# Patient Record
Sex: FEMALE | Race: WHITE | Employment: PART TIME | URBAN - METROPOLITAN AREA
[De-identification: names, ages, dates, MRNs, and addresses within clinical notes are randomized per-mention and may not be internally consistent; named-entity substitution may affect disease eponyms.]

---

## 2017-02-03 ENCOUNTER — TELEPHONE (OUTPATIENT)
Dept: FAMILY MEDICINE CLINIC | Facility: CLINIC | Age: 52
End: 2017-02-03

## 2017-02-03 RX ORDER — TRAMADOL HYDROCHLORIDE 50 MG/1
TABLET ORAL
Qty: 20 TABLET | Refills: 0 | Status: SHIPPED
Start: 2017-02-03 | End: 2017-03-08

## 2017-02-03 RX ORDER — TRAMADOL HYDROCHLORIDE 50 MG/1
TABLET ORAL
Qty: 150 TABLET | Refills: 2 | Status: SHIPPED
Start: 2017-02-03 | End: 2017-05-08

## 2017-02-03 NOTE — TELEPHONE ENCOUNTER
Patient advised prescription has been sent in [facility]

## 2017-02-27 NOTE — TELEPHONE ENCOUNTER
Medication prescription is ready for , thanks. I need to see her at the end of next month for a yearly ADD and med check before any other refills can be generated.   Thanks

## 2017-02-27 NOTE — TELEPHONE ENCOUNTER
Patient notified via detailed voicemail left at cell number (ok per  HIPAA consent)     Please schedule patient appt when she  script

## 2017-03-08 ENCOUNTER — OFFICE VISIT (OUTPATIENT)
Dept: FAMILY MEDICINE CLINIC | Facility: CLINIC | Age: 52
End: 2017-03-08

## 2017-03-08 VITALS
WEIGHT: 208 LBS | DIASTOLIC BLOOD PRESSURE: 85 MMHG | HEART RATE: 64 BPM | SYSTOLIC BLOOD PRESSURE: 138 MMHG | BODY MASS INDEX: 33.43 KG/M2 | HEIGHT: 66 IN | RESPIRATION RATE: 14 BRPM | TEMPERATURE: 97 F

## 2017-03-08 DIAGNOSIS — F98.8 ADD (ATTENTION DEFICIT DISORDER): ICD-10-CM

## 2017-03-08 DIAGNOSIS — E03.9 HYPOTHYROIDISM, UNSPECIFIED TYPE: ICD-10-CM

## 2017-03-08 DIAGNOSIS — Z00.00 WELL ADULT EXAM: Primary | ICD-10-CM

## 2017-03-08 DIAGNOSIS — Z23 NEED FOR VACCINATION: ICD-10-CM

## 2017-03-08 DIAGNOSIS — Z12.31 ENCOUNTER FOR SCREENING MAMMOGRAM FOR BREAST CANCER: ICD-10-CM

## 2017-03-08 PROCEDURE — 90471 IMMUNIZATION ADMIN: CPT | Performed by: FAMILY MEDICINE

## 2017-03-08 PROCEDURE — 90746 HEPB VACCINE 3 DOSE ADULT IM: CPT | Performed by: FAMILY MEDICINE

## 2017-03-08 PROCEDURE — 99396 PREV VISIT EST AGE 40-64: CPT | Performed by: FAMILY MEDICINE

## 2017-03-08 RX ORDER — MEDROXYPROGESTERONE ACETATE 2.5 MG/1
2.5 TABLET ORAL
COMMUNITY
Start: 2017-03-08 | End: 2021-07-06

## 2017-03-08 RX ORDER — TRAMADOL HYDROCHLORIDE 50 MG/1
TABLET ORAL
COMMUNITY
Start: 2017-03-08 | End: 2017-05-08 | Stop reason: ALTCHOICE

## 2017-03-08 NOTE — PROGRESS NOTES
Nic Bauer is a 46year old female.     CC:  Patient presents with:  Physical      HPI:  Patient is here for yearly, wellness exam  Last Lipid: 10/15  Last colonoscopy: never  Last Tetanus: 2016  Last mammo: 2014  Last Pap: per GYN    Needs paper work c disorder)    • Acne rosacea    • Hypothyroid    • Reactive airway disease    • Depression           Past Surgical History    OTHER SURGICAL HISTORY      Comment partial thyroidectomy          OTHER SURGICAL HISTORY      Comment BACK SURGERY    OTH ambulation, full ROM and 5/5 strength in B arms and legs  NEURO: intact; no sensorimotor deficit; reflexes normal  In B legs    ASSESSMENT AND PLAN    1. Well adult exam  Await labs and mammo  D/c Tob use  - KIMBERLEY SCREENING BILAT (CPT=77067);  Future  - Comp

## 2017-03-21 ENCOUNTER — OFFICE VISIT (OUTPATIENT)
Dept: FAMILY MEDICINE CLINIC | Facility: CLINIC | Age: 52
End: 2017-03-21

## 2017-03-21 ENCOUNTER — TELEPHONE (OUTPATIENT)
Dept: FAMILY MEDICINE CLINIC | Facility: CLINIC | Age: 52
End: 2017-03-21

## 2017-03-21 VITALS
HEART RATE: 80 BPM | RESPIRATION RATE: 16 BRPM | WEIGHT: 204 LBS | DIASTOLIC BLOOD PRESSURE: 90 MMHG | BODY MASS INDEX: 33 KG/M2 | TEMPERATURE: 99 F | SYSTOLIC BLOOD PRESSURE: 146 MMHG

## 2017-03-21 DIAGNOSIS — K64.8 OTHER HEMORRHOIDS: Primary | ICD-10-CM

## 2017-03-21 PROCEDURE — 99244 OFF/OP CNSLTJ NEW/EST MOD 40: CPT | Performed by: SURGERY

## 2017-03-21 NOTE — PROGRESS NOTES
Delroy Perez is a 46year old female  Patient presents with:  Consult: PCP: Dr. Cory Shearer. . consult for first colonscopy. . denies family history of colon problems. Caryn Bruce denies pain or discomfort. . room 2      REFERRED BY    Patient presents with  Request for col PHOS-BENZOYL PEROX 1-5 % External Gel APPLY TWICE A DAY Disp: 100 g Rfl: 4   Zolpidem Tartrate (AMBIEN) 5 MG Oral Tab Take 1 tablet by mouth nightly as needed for Sleep.  Disp: 30 tablet Rfl: 1     No current facility-administered medications on file prior clear to auscultation, no rales , rhonchi or wheezing  CARDIOVASCULAR: RRR, murmur negative  ABDOMEN: normal active BS, soft, nondistended  no HSM, no masses or hernias  LYMPHATIC: no axillary , supraclavicular or inguinal lymphadenopathy  EXTREMITIES: no

## 2017-03-30 NOTE — TELEPHONE ENCOUNTER
Anesthesia Evaluation     .             ROS/MED HX    ENT/Pulmonary:  - neg pulmonary ROS     Neurologic:       Cardiovascular:     (+) hypertension----. : . . . :. .       METS/Exercise Tolerance:     Hematologic:         Musculoskeletal:         GI/Hepatic:         Renal/Genitourinary:         Endo:     (+) thyroid problem hypothyroidism, .      Psychiatric:         Infectious Disease:         Malignancy:         Other:                     Physical Exam  Normal systems: pulmonary and dental    Airway   Mallampati: II  TM distance: >3 FB  Neck ROM: full    Dental     Cardiovascular   Rhythm and rate: regular and normal      Pulmonary                     Anesthesia Plan      History & Physical Review  History and physical reviewed and following examination; no interval change.    ASA Status:  2 .    NPO Status:  > 8 hours    Plan for MAC Reason for MAC:  Deep or markedly invasive procedure (G8)         Postoperative Care  Postoperative pain management:  Multi-modal analgesia.      Consents                          .   Dr Kendell Pablo spoke to Stefan Armando

## 2017-04-01 NOTE — TELEPHONE ENCOUNTER
Last OV 3/8/17  Last refilled 2/27/17  #30  0 refills    Patient aware Dr Lucero Chen out of office until Monday

## 2017-04-07 ENCOUNTER — TELEPHONE (OUTPATIENT)
Dept: FAMILY MEDICINE CLINIC | Facility: CLINIC | Age: 52
End: 2017-04-07

## 2017-04-10 ENCOUNTER — TELEPHONE (OUTPATIENT)
Dept: FAMILY MEDICINE CLINIC | Facility: CLINIC | Age: 52
End: 2017-04-10

## 2017-04-10 NOTE — TELEPHONE ENCOUNTER
CPT: [id number], [id number], [id number]  DX: Z12.11, K64.8    I called [facility] and spoke to Fidel. No auth required for the above procedures.  simone

## 2017-04-14 ENCOUNTER — TELEPHONE (OUTPATIENT)
Dept: FAMILY MEDICINE CLINIC | Facility: CLINIC | Age: 52
End: 2017-04-14

## 2017-04-15 NOTE — TELEPHONE ENCOUNTER
Pt states Dr Marcelo Mean wanted her to have an EGD done due to acid reflux. Pt aware it shouldn't be a problem to add this to her exam. Pt aware I will only call her back if there is a problem with adding it on.  Pt v/u. Nasima Hearn

## 2017-04-19 PROCEDURE — 88305 TISSUE EXAM BY PATHOLOGIST: CPT | Performed by: SURGERY

## 2017-04-24 ENCOUNTER — TELEPHONE (OUTPATIENT)
Dept: FAMILY MEDICINE CLINIC | Facility: CLINIC | Age: 52
End: 2017-04-24

## 2017-04-24 NOTE — TELEPHONE ENCOUNTER
Last refill: 8- #8.5 inhaler with 3 refills    Last Visit: 3-8-2017    Next Visit: No future appointments. Forward to Dr. Brenna Otoole please advise on refills. Thanks.

## 2017-04-28 PROCEDURE — 88304 TISSUE EXAM BY PATHOLOGIST: CPT | Performed by: SURGERY

## 2017-05-02 ENCOUNTER — TELEPHONE (OUTPATIENT)
Dept: FAMILY MEDICINE CLINIC | Facility: CLINIC | Age: 52
End: 2017-05-02

## 2017-05-02 RX ORDER — HYDROCODONE BITARTRATE AND ACETAMINOPHEN 5; 325 MG/1; MG/1
1 TABLET ORAL EVERY 6 HOURS PRN
Qty: 30 TABLET | Refills: 0 | Status: SHIPPED | OUTPATIENT
Start: 2017-05-02 | End: 2017-10-19 | Stop reason: ALTCHOICE

## 2017-05-02 RX ORDER — HYDROCODONE BITARTRATE AND ACETAMINOPHEN 5; 325 MG/1; MG/1
1 TABLET ORAL EVERY 4 HOURS PRN
Qty: 30 TABLET | Refills: 1 | Status: SHIPPED | OUTPATIENT
Start: 2017-05-02 | End: 2017-05-02 | Stop reason: CLARIF

## 2017-05-02 NOTE — TELEPHONE ENCOUNTER
Pt is s/p AEUA, Hemorrhoidectomy on 4/28/17. Pt states she is taking 1 po every 4-6 hrs for the pain. Pt states she dropped about 5 tabs down the drain on accident. She does have a few left. Pt states pain is ok. Worse with BM's.  She is doing tub soaks to

## 2017-05-02 NOTE — TELEPHONE ENCOUNTER
[facility]. Pt's  [facility] Abel will pick-up script. simone ONEAL stating ok to continue to use the Preparation H for the 1st week post-op. Advised to call back with questions.  simone

## 2017-05-08 RX ORDER — TRAMADOL HYDROCHLORIDE 50 MG/1
TABLET ORAL
Qty: 150 TABLET | Refills: 1 | Status: SHIPPED
Start: 2017-05-08 | End: 2017-06-26

## 2017-05-08 NOTE — TELEPHONE ENCOUNTER
Last refilled on 2/3/17 for # 150 with 2 rf. Received request from [facility]. Last seen on 3/8/17. No future appointments. Thank you.

## 2017-05-11 ENCOUNTER — TELEPHONE (OUTPATIENT)
Dept: FAMILY MEDICINE CLINIC | Facility: CLINIC | Age: 52
End: 2017-05-11

## 2017-06-13 ENCOUNTER — TELEPHONE (OUTPATIENT)
Dept: FAMILY MEDICINE CLINIC | Facility: CLINIC | Age: 52
End: 2017-06-13

## 2017-06-13 NOTE — TELEPHONE ENCOUNTER
Pt is overdue to have CBC, CMP, lipid, TSH, and free T4. Message sent via [email or web address] to pt to call office to schedule.

## 2017-06-26 RX ORDER — TRAMADOL HYDROCHLORIDE 50 MG/1
TABLET ORAL
Qty: 21 TABLET | Refills: 0 | Status: SHIPPED
Start: 2017-06-26 | End: 2017-07-12

## 2017-07-12 ENCOUNTER — TELEPHONE (OUTPATIENT)
Dept: FAMILY MEDICINE CLINIC | Facility: CLINIC | Age: 52
End: 2017-07-12

## 2017-07-12 DIAGNOSIS — F51.01 PRIMARY INSOMNIA: ICD-10-CM

## 2017-07-12 DIAGNOSIS — M25.552 LEFT HIP PAIN: ICD-10-CM

## 2017-07-12 DIAGNOSIS — F98.8 ATTENTION DEFICIT DISORDER, UNSPECIFIED HYPERACTIVITY PRESENCE: ICD-10-CM

## 2017-07-12 DIAGNOSIS — E03.8 OTHER SPECIFIED HYPOTHYROIDISM: ICD-10-CM

## 2017-07-12 DIAGNOSIS — Z13.220 LIPID SCREENING: ICD-10-CM

## 2017-07-12 DIAGNOSIS — F98.8 ATTENTION DEFICIT DISORDER (ADD) WITHOUT HYPERACTIVITY: ICD-10-CM

## 2017-07-12 RX ORDER — TRAMADOL HYDROCHLORIDE 50 MG/1
TABLET ORAL
Qty: 30 TABLET | Refills: 0 | Status: SHIPPED | OUTPATIENT
Start: 2017-07-12 | End: 2017-07-12

## 2017-07-12 RX ORDER — CLINDAMYCIN AND BENZOYL PEROXIDE 10; 50 MG/G; MG/G
GEL TOPICAL
Qty: 100 G | Refills: 3 | Status: ON HOLD | OUTPATIENT
Start: 2017-07-12 | End: 2017-11-20

## 2017-07-12 RX ORDER — DEXTROAMPHETAMINE SACCHARATE, AMPHETAMINE ASPARTATE MONOHYDRATE, DEXTROAMPHETAMINE SULFATE AND AMPHETAMINE SULFATE 7.5; 7.5; 7.5; 7.5 MG/1; MG/1; MG/1; MG/1
30 CAPSULE, EXTENDED RELEASE ORAL DAILY
Qty: 30 CAPSULE | Refills: 0 | Status: SHIPPED | OUTPATIENT
Start: 2017-08-11 | End: 2017-09-10

## 2017-07-12 RX ORDER — DEXTROAMPHETAMINE SACCHARATE, AMPHETAMINE ASPARTATE, DEXTROAMPHETAMINE SULFATE AND AMPHETAMINE SULFATE 1.25; 1.25; 1.25; 1.25 MG/1; MG/1; MG/1; MG/1
5 TABLET ORAL DAILY
Qty: 30 TABLET | Refills: 0 | Status: SHIPPED | OUTPATIENT
Start: 2017-09-11 | End: 2017-10-11

## 2017-07-12 RX ORDER — TRAMADOL HYDROCHLORIDE 50 MG/1
TABLET ORAL
Qty: 30 TABLET | Refills: 0 | Status: SHIPPED | OUTPATIENT
Start: 2017-07-12 | End: 2017-07-25

## 2017-07-12 RX ORDER — DEXTROAMPHETAMINE SACCHARATE, AMPHETAMINE ASPARTATE MONOHYDRATE, DEXTROAMPHETAMINE SULFATE AND AMPHETAMINE SULFATE 7.5; 7.5; 7.5; 7.5 MG/1; MG/1; MG/1; MG/1
30 CAPSULE, EXTENDED RELEASE ORAL DAILY
Qty: 30 CAPSULE | Refills: 0 | Status: SHIPPED | OUTPATIENT
Start: 2017-07-12 | End: 2017-08-11

## 2017-07-12 RX ORDER — ALBUTEROL SULFATE 90 UG/1
AEROSOL, METERED RESPIRATORY (INHALATION)
Qty: 3 INHALER | Refills: 3 | Status: SHIPPED | OUTPATIENT
Start: 2017-07-12 | End: 2017-09-12

## 2017-07-12 RX ORDER — DEXTROAMPHETAMINE SACCHARATE, AMPHETAMINE ASPARTATE, DEXTROAMPHETAMINE SULFATE AND AMPHETAMINE SULFATE 1.25; 1.25; 1.25; 1.25 MG/1; MG/1; MG/1; MG/1
5 TABLET ORAL DAILY
Qty: 30 TABLET | Refills: 0 | Status: SHIPPED | OUTPATIENT
Start: 2017-07-12 | End: 2019-04-29

## 2017-07-12 RX ORDER — DEXTROAMPHETAMINE SACCHARATE, AMPHETAMINE ASPARTATE MONOHYDRATE, DEXTROAMPHETAMINE SULFATE AND AMPHETAMINE SULFATE 7.5; 7.5; 7.5; 7.5 MG/1; MG/1; MG/1; MG/1
30 CAPSULE, EXTENDED RELEASE ORAL DAILY
Qty: 30 CAPSULE | Refills: 0 | Status: SHIPPED | OUTPATIENT
Start: 2017-09-11 | End: 2019-08-27

## 2017-07-12 RX ORDER — DEXTROAMPHETAMINE SACCHARATE, AMPHETAMINE ASPARTATE, DEXTROAMPHETAMINE SULFATE AND AMPHETAMINE SULFATE 1.25; 1.25; 1.25; 1.25 MG/1; MG/1; MG/1; MG/1
5 TABLET ORAL DAILY
Qty: 30 TABLET | Refills: 0 | Status: SHIPPED | OUTPATIENT
Start: 2017-08-11 | End: 2019-04-29

## 2017-07-12 NOTE — TELEPHONE ENCOUNTER
Patient picked up 3 scripts for Adderall XR 30 MG (3 month supply), Adderall 5 MG (3 month supply) & Tramadol 50 MG on 07/12/17. [address] [place] [address] # [phone].

## 2017-07-12 NOTE — TELEPHONE ENCOUNTER
Pt called, needs refills on TRAMADOL HCL 50 MG Oral Tab, ADDERALL 30 mg and ADDERALL 5 mg. Pt will  scripts.   Please call pt at [phone]

## 2017-07-13 ENCOUNTER — TELEPHONE (OUTPATIENT)
Dept: FAMILY MEDICINE CLINIC | Facility: CLINIC | Age: 52
End: 2017-07-13

## 2017-07-13 NOTE — TELEPHONE ENCOUNTER
Spoke to the pharmacist at [facility] and advised her of the information per Dr. Domenic Miller

## 2017-07-13 NOTE — TELEPHONE ENCOUNTER
Asya from [facility] called - patient dropped off a prescription for the Tramadol - Asya advised that it is too soon-   Patient had refills on:  5 8 2017 # 150 with 1 refills  6 26 2017 #21   7 12 2017 #30   Patient advised the pharmacy that she had lost

## 2017-07-14 ENCOUNTER — TELEPHONE (OUTPATIENT)
Dept: FAMILY MEDICINE CLINIC | Facility: CLINIC | Age: 52
End: 2017-07-14

## 2017-07-14 RX ORDER — ESCITALOPRAM OXALATE 20 MG/1
TABLET ORAL
Qty: 90 TABLET | Refills: 2 | Status: ON HOLD | OUTPATIENT
Start: 2017-07-14 | End: 2017-11-20

## 2017-07-14 RX ORDER — LEVOTHYROXINE SODIUM 0.2 MG/1
200 TABLET ORAL
Qty: 90 TABLET | Refills: 2 | Status: SHIPPED | OUTPATIENT
Start: 2017-07-14 | End: 2018-04-10

## 2017-07-25 ENCOUNTER — TELEPHONE (OUTPATIENT)
Dept: FAMILY MEDICINE CLINIC | Facility: CLINIC | Age: 52
End: 2017-07-25

## 2017-07-25 ENCOUNTER — OFFICE VISIT (OUTPATIENT)
Dept: FAMILY MEDICINE CLINIC | Facility: CLINIC | Age: 52
End: 2017-07-25

## 2017-07-25 VITALS
WEIGHT: 201 LBS | OXYGEN SATURATION: 98 % | TEMPERATURE: 98 F | BODY MASS INDEX: 31 KG/M2 | HEART RATE: 84 BPM | RESPIRATION RATE: 20 BRPM | SYSTOLIC BLOOD PRESSURE: 146 MMHG | DIASTOLIC BLOOD PRESSURE: 100 MMHG

## 2017-07-25 DIAGNOSIS — R03.0 ELEVATED BLOOD PRESSURE READING: ICD-10-CM

## 2017-07-25 DIAGNOSIS — J31.0 PURULENT RHINITIS: Primary | ICD-10-CM

## 2017-07-25 PROCEDURE — 99214 OFFICE O/P EST MOD 30 MIN: CPT | Performed by: FAMILY MEDICINE

## 2017-07-25 RX ORDER — TRAMADOL HYDROCHLORIDE 50 MG/1
TABLET ORAL
Qty: 90 TABLET | Refills: 1 | Status: SHIPPED
Start: 2017-07-25 | End: 2017-09-05

## 2017-07-25 RX ORDER — AZITHROMYCIN 250 MG/1
TABLET, FILM COATED ORAL
Qty: 6 TABLET | Refills: 0 | Status: SHIPPED | OUTPATIENT
Start: 2017-07-25 | End: 2017-07-30

## 2017-07-25 NOTE — TELEPHONE ENCOUNTER
Left message on patients voice mail with the appointment time per  - asked patient to call the office to verify time

## 2017-07-25 NOTE — TELEPHONE ENCOUNTER
Pt Has a sinus infection and would either like to be seen or to have something sent to [facility].

## 2017-07-25 NOTE — PROGRESS NOTES
Serenity Stevens is a 45year old female. CC:  Patient presents with:  Sinus Problem: per pt      HPI:  The patient has primary complaint of facial pain   and nasal congestion for  1 week. Associated symptoms include rhinorrhea that is thick and yellow.  Diogenes Phoenix amphetamine-dextroamphetamine 5 MG Oral Tab Take 1 tablet (5 mg total) by mouth daily. Please fill after 28 days. Disp: 30 tablet Rfl: 0   [START ON 9/11/2017] amphetamine-dextroamphetamine 5 MG Oral Tab Take 1 tablet (5 mg total) by mouth daily.  Please fi Packs/day: 1.00      Years: 0.00      Smokeless tobacco: Never Used                      Alcohol use: Yes           0.0 oz/week     Comment: 2 glasses of wine per week        ROS:  General: lower energy  GI: Melia Nancy

## 2017-07-25 NOTE — TELEPHONE ENCOUNTER
Last refill: 7/12/2017 #30 with 0 refills  Last Visit: today  Next Visit: No future appointments. Request was requested for 90 days to be sent to [facility]. Forward to Dr. Alayna Perez please advise on refills. Thanks.

## 2017-07-28 NOTE — TELEPHONE ENCOUNTER
Phone call to [facility] and advised that this patient had Tramadol refilled on 7 25 2017  Phone call from pharmacy on 7 13 2017 that it is was too soon for a refill -would not be available for refill till 8 2 2017- if patient needed the medication she would have

## 2017-08-02 NOTE — TELEPHONE ENCOUNTER
Last OV 7/25/17, No future appointments.     Last rx given 7/25/17 for #90 with one refill was sent to [facility]

## 2017-08-03 RX ORDER — TRAMADOL HYDROCHLORIDE 50 MG/1
TABLET ORAL
Qty: 30 TABLET | Refills: 0 | OUTPATIENT
Start: 2017-08-03

## 2017-08-21 ENCOUNTER — PATIENT OUTREACH (OUTPATIENT)
Dept: FAMILY MEDICINE CLINIC | Facility: CLINIC | Age: 52
End: 2017-08-21

## 2017-09-05 RX ORDER — TRAMADOL HYDROCHLORIDE 50 MG/1
TABLET ORAL
Qty: 90 TABLET | Refills: 1 | Status: SHIPPED
Start: 2017-09-05 | End: 2017-10-06

## 2017-09-12 RX ORDER — ALBUTEROL SULFATE 90 UG/1
AEROSOL, METERED RESPIRATORY (INHALATION)
Qty: 3 INHALER | Refills: 0 | Status: SHIPPED | OUTPATIENT
Start: 2017-09-12 | End: 2017-09-20

## 2017-09-20 RX ORDER — ALBUTEROL SULFATE 90 UG/1
AEROSOL, METERED RESPIRATORY (INHALATION)
Qty: 3 INHALER | Refills: 0 | Status: ON HOLD | OUTPATIENT
Start: 2017-09-20 | End: 2017-11-20

## 2017-10-19 ENCOUNTER — TELEPHONE (OUTPATIENT)
Dept: FAMILY MEDICINE CLINIC | Facility: CLINIC | Age: 52
End: 2017-10-19

## 2017-10-19 DIAGNOSIS — E03.8 OTHER SPECIFIED HYPOTHYROIDISM: ICD-10-CM

## 2017-10-19 DIAGNOSIS — F51.01 PRIMARY INSOMNIA: ICD-10-CM

## 2017-10-19 DIAGNOSIS — M25.552 LEFT HIP PAIN: ICD-10-CM

## 2017-10-19 DIAGNOSIS — Z13.220 LIPID SCREENING: ICD-10-CM

## 2017-10-19 DIAGNOSIS — F98.8 ATTENTION DEFICIT DISORDER (ADD) WITHOUT HYPERACTIVITY: ICD-10-CM

## 2017-10-19 PROBLEM — M54.50 CHRONIC MIDLINE LOW BACK PAIN WITHOUT SCIATICA: Status: ACTIVE | Noted: 2017-10-19

## 2017-10-19 PROBLEM — G89.29 CHRONIC MIDLINE LOW BACK PAIN WITHOUT SCIATICA: Status: ACTIVE | Noted: 2017-10-19

## 2017-10-19 RX ORDER — DEXTROAMPHETAMINE SACCHARATE, AMPHETAMINE ASPARTATE, DEXTROAMPHETAMINE SULFATE AND AMPHETAMINE SULFATE 1.25; 1.25; 1.25; 1.25 MG/1; MG/1; MG/1; MG/1
5 TABLET ORAL DAILY
Qty: 30 TABLET | Refills: 0 | Status: SHIPPED | OUTPATIENT
Start: 2017-12-15 | End: 2017-10-19 | Stop reason: DRUGHIGH

## 2017-10-19 RX ORDER — DEXTROAMPHETAMINE SACCHARATE, AMPHETAMINE ASPARTATE, DEXTROAMPHETAMINE SULFATE AND AMPHETAMINE SULFATE 1.25; 1.25; 1.25; 1.25 MG/1; MG/1; MG/1; MG/1
5 TABLET ORAL DAILY
Qty: 30 TABLET | Refills: 0 | Status: SHIPPED | OUTPATIENT
Start: 2017-10-19 | End: 2017-10-19 | Stop reason: DRUGHIGH

## 2017-10-19 RX ORDER — DEXTROAMPHETAMINE SACCHARATE, AMPHETAMINE ASPARTATE, DEXTROAMPHETAMINE SULFATE AND AMPHETAMINE SULFATE 1.25; 1.25; 1.25; 1.25 MG/1; MG/1; MG/1; MG/1
5 TABLET ORAL DAILY
Qty: 30 TABLET | Refills: 0 | Status: SHIPPED | OUTPATIENT
Start: 2017-11-17 | End: 2017-10-19 | Stop reason: DRUGHIGH

## 2017-10-19 RX ORDER — DEXTROAMPHETAMINE SACCHARATE, AMPHETAMINE ASPARTATE MONOHYDRATE, DEXTROAMPHETAMINE SULFATE AND AMPHETAMINE SULFATE 7.5; 7.5; 7.5; 7.5 MG/1; MG/1; MG/1; MG/1
30 CAPSULE, EXTENDED RELEASE ORAL DAILY
Qty: 30 CAPSULE | Refills: 0 | Status: SHIPPED | OUTPATIENT
Start: 2017-10-19 | End: 2018-02-20

## 2017-10-19 RX ORDER — DEXTROAMPHETAMINE SACCHARATE, AMPHETAMINE ASPARTATE MONOHYDRATE, DEXTROAMPHETAMINE SULFATE AND AMPHETAMINE SULFATE 7.5; 7.5; 7.5; 7.5 MG/1; MG/1; MG/1; MG/1
30 CAPSULE, EXTENDED RELEASE ORAL DAILY
Qty: 30 CAPSULE | Refills: 0 | Status: SHIPPED | OUTPATIENT
Start: 2017-11-17 | End: 2017-10-19

## 2017-10-19 RX ORDER — DEXTROAMPHETAMINE SACCHARATE, AMPHETAMINE ASPARTATE MONOHYDRATE, DEXTROAMPHETAMINE SULFATE AND AMPHETAMINE SULFATE 7.5; 7.5; 7.5; 7.5 MG/1; MG/1; MG/1; MG/1
30 CAPSULE, EXTENDED RELEASE ORAL DAILY
Qty: 30 CAPSULE | Refills: 0 | Status: SHIPPED | OUTPATIENT
Start: 2017-12-15 | End: 2017-10-19

## 2017-10-19 NOTE — TELEPHONE ENCOUNTER
Pt picked up scripts for ADDERALL XR 30 mg and ADDERALL 5 mg, 10/19/17-01/14/18.    Pt [place] DL# [phone]

## 2017-10-25 ENCOUNTER — HOSPITAL ENCOUNTER (OUTPATIENT)
Dept: MAMMOGRAPHY | Age: 52
Discharge: HOME | End: 2017-10-25
Attending: FAMILY MEDICINE
Payer: COMMERCIAL

## 2017-10-25 ENCOUNTER — HOSPITAL ENCOUNTER (OUTPATIENT)
Dept: GENERAL RADIOLOGY | Age: 52
Discharge: HOME | End: 2017-10-25
Attending: FAMILY MEDICINE
Payer: COMMERCIAL

## 2017-10-25 ENCOUNTER — OFFICE VISIT (OUTPATIENT)
Dept: FAMILY MEDICINE CLINIC | Facility: CLINIC | Age: 52
End: 2017-10-25

## 2017-10-25 VITALS
WEIGHT: 217 LBS | HEART RATE: 64 BPM | BODY MASS INDEX: 34.87 KG/M2 | HEIGHT: 66 IN | TEMPERATURE: 98 F | RESPIRATION RATE: 14 BRPM | DIASTOLIC BLOOD PRESSURE: 84 MMHG | SYSTOLIC BLOOD PRESSURE: 136 MMHG

## 2017-10-25 DIAGNOSIS — Z12.31 ENCOUNTER FOR SCREENING MAMMOGRAM FOR BREAST CANCER: ICD-10-CM

## 2017-10-25 DIAGNOSIS — Z00.00 WELL ADULT EXAM: ICD-10-CM

## 2017-10-25 DIAGNOSIS — Z72.0 TOBACCO USE: ICD-10-CM

## 2017-10-25 DIAGNOSIS — Z01.818 PREOP EXAMINATION: ICD-10-CM

## 2017-10-25 DIAGNOSIS — E03.9 HYPOTHYROIDISM, UNSPECIFIED TYPE: ICD-10-CM

## 2017-10-25 DIAGNOSIS — Z01.818 PREOP EXAMINATION: Primary | ICD-10-CM

## 2017-10-25 DIAGNOSIS — J45.20 MILD INTERMITTENT REACTIVE AIRWAY DISEASE WITHOUT COMPLICATION: ICD-10-CM

## 2017-10-25 DIAGNOSIS — M16.12 ARTHRITIS OF LEFT HIP: ICD-10-CM

## 2017-10-25 DIAGNOSIS — E78.5 HYPERLIPIDEMIA, UNSPECIFIED HYPERLIPIDEMIA TYPE: ICD-10-CM

## 2017-10-25 PROCEDURE — 80050 GENERAL HEALTH PANEL: CPT | Performed by: FAMILY MEDICINE

## 2017-10-25 PROCEDURE — 80061 LIPID PANEL: CPT | Performed by: FAMILY MEDICINE

## 2017-10-25 PROCEDURE — 77067 SCR MAMMO BI INCL CAD: CPT | Performed by: FAMILY MEDICINE

## 2017-10-25 PROCEDURE — 36415 COLL VENOUS BLD VENIPUNCTURE: CPT | Performed by: FAMILY MEDICINE

## 2017-10-25 PROCEDURE — 84439 ASSAY OF FREE THYROXINE: CPT | Performed by: FAMILY MEDICINE

## 2017-10-25 PROCEDURE — 99244 OFF/OP CNSLTJ NEW/EST MOD 40: CPT | Performed by: FAMILY MEDICINE

## 2017-10-25 PROCEDURE — 93000 ELECTROCARDIOGRAM COMPLETE: CPT | Performed by: FAMILY MEDICINE

## 2017-10-25 PROCEDURE — 83721 ASSAY OF BLOOD LIPOPROTEIN: CPT | Performed by: FAMILY MEDICINE

## 2017-10-25 PROCEDURE — 71020 XR CHEST PA + LAT CHEST (CPT=71020): CPT | Performed by: FAMILY MEDICINE

## 2017-10-25 PROCEDURE — 87081 CULTURE SCREEN ONLY: CPT | Performed by: FAMILY MEDICINE

## 2017-10-25 NOTE — PROGRESS NOTES
Batool Luke is a 45year old female.     CC:  Patient presents with:  Pre-Op Exam: left hip-11/20      HPI:  I am seeing Batool Luke for preoperative evaluation at the request of Dr. Sg Jarquin for my evaluation of the patient's medical proble Chronic midline low back pain without sciatica 10/19/2017   • Depression    • Hyperlipidemia    • Hypothyroid    • Osteoarthritis    • Reactive airway disease    • Visual impairment     reading glasses      Past Surgical History:  No date:   No da click; murmur negative  PULM: clear to auscultation B, no accessory muscle use  GI: normal active BS+, soft, nondistended; no HSM; no masses; no bruits; no masses; nontender, no G/R/R   PSYCH: alert and oriented x 3; affect appropriate  SKIN: not examined type  Await results   - TSH+FREE T4    4. Mild intermittent reactive airway disease without complication  Stable     5. Tobacco use  I have encouraged the patient to stop tobacco products.  Increased risk for numerous cancers, heart disease and stroke discu surgical facility.             Rubi Barney MD

## 2017-10-26 ENCOUNTER — TELEPHONE (OUTPATIENT)
Dept: FAMILY MEDICINE CLINIC | Facility: CLINIC | Age: 52
End: 2017-10-26

## 2017-10-26 DIAGNOSIS — E03.9 HYPOTHYROIDISM, UNSPECIFIED TYPE: ICD-10-CM

## 2017-10-26 DIAGNOSIS — E78.5 HYPERLIPIDEMIA, UNSPECIFIED HYPERLIPIDEMIA TYPE: Primary | ICD-10-CM

## 2017-10-26 RX ORDER — LEVOTHYROXINE SODIUM 0.05 MG/1
50 TABLET ORAL
Qty: 30 TABLET | Refills: 1 | Status: CANCELLED | OUTPATIENT
Start: 2017-10-26

## 2017-10-26 NOTE — TELEPHONE ENCOUNTER
Patient called back the office. She states that she does not always take the medication by itself . She sometimes takes with her estrogen. Dr. Radha Ramos advised of this. He advised to have patient take the medication by itself.  Patient wanted to know if she

## 2017-10-26 NOTE — TELEPHONE ENCOUNTER
----- Message from Yaritza Gan MD sent at 10/26/2017  8:57 AM CDT -----  Please let patient know that the  electrolyte, liver, kidney, thyroid, anemia and leukemia tests were fine. Her lipids are elevated, especially the TG level.   Her thyroid is not

## 2017-11-02 RX ORDER — SULFACETAMIDE SODIUM, SULFUR 100; 50 MG/G; MG/G
LOTION TOPICAL
Qty: 90 G | Refills: 3 | Status: ON HOLD | OUTPATIENT
Start: 2017-11-02 | End: 2017-11-20

## 2017-11-02 NOTE — TELEPHONE ENCOUNTER
Last refill: 6- #3 can with 3 refills    Last Visit: 10-    Next Visit: No Future Appointments        Forward to Dr. Annmarie Rodriguez please advise on refills. Thanks.

## 2017-11-06 ENCOUNTER — HOSPITAL ENCOUNTER (OUTPATIENT)
Dept: PHYSICAL THERAPY | Facility: HOSPITAL | Age: 52
Discharge: HOME | End: 2017-11-06
Payer: COMMERCIAL

## 2017-11-06 ENCOUNTER — LABORATORY ENCOUNTER (OUTPATIENT)
Dept: LAB | Facility: HOSPITAL | Age: 52
End: 2017-11-06
Payer: COMMERCIAL

## 2017-11-06 DIAGNOSIS — Z01.818 PREOP TESTING: ICD-10-CM

## 2017-11-06 PROCEDURE — 87081 CULTURE SCREEN ONLY: CPT

## 2017-11-06 PROCEDURE — 36415 COLL VENOUS BLD VENIPUNCTURE: CPT

## 2017-11-06 PROCEDURE — 80048 BASIC METABOLIC PNL TOTAL CA: CPT

## 2017-11-06 PROCEDURE — 86850 RBC ANTIBODY SCREEN: CPT

## 2017-11-06 PROCEDURE — 85025 COMPLETE CBC W/AUTO DIFF WBC: CPT

## 2017-11-06 PROCEDURE — 86900 BLOOD TYPING SEROLOGIC ABO: CPT

## 2017-11-06 PROCEDURE — 86901 BLOOD TYPING SEROLOGIC RH(D): CPT

## 2017-11-20 ENCOUNTER — SURGERY (OUTPATIENT)
Age: 52
End: 2017-11-20

## 2017-11-20 ENCOUNTER — APPOINTMENT (OUTPATIENT)
Dept: GENERAL RADIOLOGY | Facility: HOSPITAL | Age: 52
DRG: 470 | End: 2017-11-20
Attending: ORTHOPAEDIC SURGERY
Payer: COMMERCIAL

## 2017-11-20 ENCOUNTER — HOSPITAL ENCOUNTER (INPATIENT)
Facility: HOSPITAL | Age: 52
LOS: 2 days | Discharge: HOME HEALTH | DRG: 470 | End: 2017-11-22
Attending: ORTHOPAEDIC SURGERY | Admitting: ORTHOPAEDIC SURGERY
Payer: COMMERCIAL

## 2017-11-20 ENCOUNTER — ANESTHESIA (OUTPATIENT)
Dept: SURGERY | Facility: HOSPITAL | Age: 52
DRG: 470 | End: 2017-11-20
Payer: COMMERCIAL

## 2017-11-20 ENCOUNTER — ANESTHESIA EVENT (OUTPATIENT)
Dept: SURGERY | Facility: HOSPITAL | Age: 52
DRG: 470 | End: 2017-11-20
Payer: COMMERCIAL

## 2017-11-20 DIAGNOSIS — Z96.642 STATUS POST LEFT HIP REPLACEMENT: ICD-10-CM

## 2017-11-20 DIAGNOSIS — Z01.818 PREOP TESTING: Primary | ICD-10-CM

## 2017-11-20 PROCEDURE — 99252 IP/OBS CONSLTJ NEW/EST SF 35: CPT | Performed by: HOSPITALIST

## 2017-11-20 PROCEDURE — 73501 X-RAY EXAM HIP UNI 1 VIEW: CPT | Performed by: ORTHOPAEDIC SURGERY

## 2017-11-20 PROCEDURE — 3E0T3BZ INTRODUCTION OF ANESTHETIC AGENT INTO PERIPHERAL NERVES AND PLEXI, PERCUTANEOUS APPROACH: ICD-10-PCS | Performed by: ANESTHESIOLOGY

## 2017-11-20 PROCEDURE — 0SRB0JZ REPLACEMENT OF LEFT HIP JOINT WITH SYNTHETIC SUBSTITUTE, OPEN APPROACH: ICD-10-PCS | Performed by: ORTHOPAEDIC SURGERY

## 2017-11-20 DEVICE — BONE SCREW 6.5X20 SELF-TAP: Type: IMPLANTABLE DEVICE | Site: HIP | Status: FUNCTIONAL

## 2017-11-20 DEVICE — BONE SCREW 6.5X25 SELF-TAP: Type: IMPLANTABLE DEVICE | Site: HIP | Status: FUNCTIONAL

## 2017-11-20 DEVICE — BIOLOX® DELTA, CERAMIC FEMORAL HEAD, M, Ø 32/0, TAPER 12/14
Type: IMPLANTABLE DEVICE | Site: HIP | Status: FUNCTIONAL
Brand: BIOLOX® DELTA

## 2017-11-20 DEVICE — IMPLANTABLE DEVICE: Type: IMPLANTABLE DEVICE | Site: HIP | Status: FUNCTIONAL

## 2017-11-20 DEVICE — MOD CUP NEU LNR LG 48X32: Type: IMPLANTABLE DEVICE | Site: HIP | Status: FUNCTIONAL

## 2017-11-20 RX ORDER — CYCLOBENZAPRINE HCL 5 MG
5 TABLET ORAL 3 TIMES DAILY PRN
Status: DISCONTINUED | OUTPATIENT
Start: 2017-11-20 | End: 2017-11-22

## 2017-11-20 RX ORDER — SODIUM CHLORIDE, SODIUM LACTATE, POTASSIUM CHLORIDE, CALCIUM CHLORIDE 600; 310; 30; 20 MG/100ML; MG/100ML; MG/100ML; MG/100ML
INJECTION, SOLUTION INTRAVENOUS CONTINUOUS
Status: DISCONTINUED | OUTPATIENT
Start: 2017-11-20 | End: 2017-11-22

## 2017-11-20 RX ORDER — ACETAMINOPHEN 325 MG/1
TABLET ORAL
Status: COMPLETED
Start: 2017-11-20 | End: 2017-11-20

## 2017-11-20 RX ORDER — DIPHENHYDRAMINE HYDROCHLORIDE 50 MG/ML
12.5 INJECTION INTRAMUSCULAR; INTRAVENOUS EVERY 4 HOURS PRN
Status: DISCONTINUED | OUTPATIENT
Start: 2017-11-20 | End: 2017-11-22

## 2017-11-20 RX ORDER — ESCITALOPRAM OXALATE 20 MG/1
20 TABLET ORAL EVERY MORNING
COMMUNITY
End: 2018-04-10

## 2017-11-20 RX ORDER — ONDANSETRON 2 MG/ML
4 INJECTION INTRAMUSCULAR; INTRAVENOUS EVERY 4 HOURS PRN
Status: DISCONTINUED | OUTPATIENT
Start: 2017-11-20 | End: 2017-11-22

## 2017-11-20 RX ORDER — OXYCODONE HYDROCHLORIDE 15 MG/1
15 TABLET ORAL EVERY 4 HOURS PRN
Status: DISCONTINUED | OUTPATIENT
Start: 2017-11-20 | End: 2017-11-21

## 2017-11-20 RX ORDER — BISACODYL 10 MG
10 SUPPOSITORY, RECTAL RECTAL
Status: DISCONTINUED | OUTPATIENT
Start: 2017-11-20 | End: 2017-11-22

## 2017-11-20 RX ORDER — HYDROMORPHONE HYDROCHLORIDE 1 MG/ML
INJECTION, SOLUTION INTRAMUSCULAR; INTRAVENOUS; SUBCUTANEOUS
Status: COMPLETED
Start: 2017-11-20 | End: 2017-11-20

## 2017-11-20 RX ORDER — ACETAMINOPHEN 325 MG/1
650 TABLET ORAL ONCE
Status: COMPLETED | OUTPATIENT
Start: 2017-11-20 | End: 2017-11-20

## 2017-11-20 RX ORDER — ONDANSETRON 2 MG/ML
4 INJECTION INTRAMUSCULAR; INTRAVENOUS AS NEEDED
Status: DISCONTINUED | OUTPATIENT
Start: 2017-11-20 | End: 2017-11-20 | Stop reason: HOSPADM

## 2017-11-20 RX ORDER — SCOLOPAMINE TRANSDERMAL SYSTEM 1 MG/1
1 PATCH, EXTENDED RELEASE TRANSDERMAL ONCE
Status: DISCONTINUED | OUTPATIENT
Start: 2017-11-20 | End: 2017-11-20

## 2017-11-20 RX ORDER — METOCLOPRAMIDE HYDROCHLORIDE 5 MG/ML
10 INJECTION INTRAMUSCULAR; INTRAVENOUS EVERY 6 HOURS PRN
Status: DISCONTINUED | OUTPATIENT
Start: 2017-11-20 | End: 2017-11-22

## 2017-11-20 RX ORDER — MIDAZOLAM HYDROCHLORIDE 1 MG/ML
1 INJECTION INTRAMUSCULAR; INTRAVENOUS EVERY 5 MIN PRN
Status: DISCONTINUED | OUTPATIENT
Start: 2017-11-20 | End: 2017-11-20 | Stop reason: HOSPADM

## 2017-11-20 RX ORDER — NALOXONE HYDROCHLORIDE 0.4 MG/ML
80 INJECTION, SOLUTION INTRAMUSCULAR; INTRAVENOUS; SUBCUTANEOUS AS NEEDED
Status: DISCONTINUED | OUTPATIENT
Start: 2017-11-20 | End: 2017-11-20 | Stop reason: HOSPADM

## 2017-11-20 RX ORDER — DIPHENHYDRAMINE HCL 25 MG
25 CAPSULE ORAL EVERY 4 HOURS PRN
Status: DISCONTINUED | OUTPATIENT
Start: 2017-11-20 | End: 2017-11-22

## 2017-11-20 RX ORDER — ESCITALOPRAM OXALATE 20 MG/1
20 TABLET ORAL EVERY MORNING
Status: DISCONTINUED | OUTPATIENT
Start: 2017-11-20 | End: 2017-11-22

## 2017-11-20 RX ORDER — SENNOSIDES 8.6 MG
17.2 TABLET ORAL NIGHTLY
Status: DISCONTINUED | OUTPATIENT
Start: 2017-11-20 | End: 2017-11-22

## 2017-11-20 RX ORDER — HYDROMORPHONE HYDROCHLORIDE 1 MG/ML
0.4 INJECTION, SOLUTION INTRAMUSCULAR; INTRAVENOUS; SUBCUTANEOUS EVERY 5 MIN PRN
Status: DISCONTINUED | OUTPATIENT
Start: 2017-11-20 | End: 2017-11-20 | Stop reason: HOSPADM

## 2017-11-20 RX ORDER — SULFACETAMIDE SODIUM, SULFUR 100; 50 MG/G; MG/G
LOTION TOPICAL
COMMUNITY
End: 2019-07-01

## 2017-11-20 RX ORDER — SODIUM CHLORIDE, SODIUM LACTATE, POTASSIUM CHLORIDE, CALCIUM CHLORIDE 600; 310; 30; 20 MG/100ML; MG/100ML; MG/100ML; MG/100ML
INJECTION, SOLUTION INTRAVENOUS CONTINUOUS
Status: DISCONTINUED | OUTPATIENT
Start: 2017-11-20 | End: 2017-11-20 | Stop reason: HOSPADM

## 2017-11-20 RX ORDER — TRAMADOL HYDROCHLORIDE 50 MG/1
50 TABLET ORAL EVERY 6 HOURS PRN
COMMUNITY
End: 2018-02-20

## 2017-11-20 RX ORDER — DIPHENHYDRAMINE HYDROCHLORIDE 50 MG/ML
25 INJECTION INTRAMUSCULAR; INTRAVENOUS ONCE AS NEEDED
Status: ACTIVE | OUTPATIENT
Start: 2017-11-20 | End: 2017-11-20

## 2017-11-20 RX ORDER — KETOROLAC TROMETHAMINE 30 MG/ML
30 INJECTION, SOLUTION INTRAMUSCULAR; INTRAVENOUS EVERY 6 HOURS
Status: COMPLETED | OUTPATIENT
Start: 2017-11-20 | End: 2017-11-21

## 2017-11-20 RX ORDER — LEVOTHYROXINE SODIUM 0.2 MG/1
200 TABLET ORAL
Status: DISCONTINUED | OUTPATIENT
Start: 2017-11-21 | End: 2017-11-22

## 2017-11-20 RX ORDER — OXYCODONE HYDROCHLORIDE 10 MG/1
10 TABLET ORAL EVERY 4 HOURS PRN
Status: DISCONTINUED | OUTPATIENT
Start: 2017-11-20 | End: 2017-11-21

## 2017-11-20 RX ORDER — HYDROMORPHONE HYDROCHLORIDE 1 MG/ML
0.2 INJECTION, SOLUTION INTRAMUSCULAR; INTRAVENOUS; SUBCUTANEOUS EVERY 2 HOUR PRN
Status: DISCONTINUED | OUTPATIENT
Start: 2017-11-20 | End: 2017-11-22

## 2017-11-20 RX ORDER — CEFAZOLIN SODIUM 1 G/3ML
INJECTION, POWDER, FOR SOLUTION INTRAMUSCULAR; INTRAVENOUS
Status: DISCONTINUED | OUTPATIENT
Start: 2017-11-20 | End: 2017-11-20 | Stop reason: HOSPADM

## 2017-11-20 RX ORDER — OXYCODONE HYDROCHLORIDE 5 MG/1
5 TABLET ORAL EVERY 4 HOURS PRN
Status: DISCONTINUED | OUTPATIENT
Start: 2017-11-20 | End: 2017-11-21

## 2017-11-20 RX ORDER — POLYETHYLENE GLYCOL 3350 17 G/17G
17 POWDER, FOR SOLUTION ORAL DAILY PRN
Status: DISCONTINUED | OUTPATIENT
Start: 2017-11-20 | End: 2017-11-22

## 2017-11-20 RX ORDER — ALBUTEROL SULFATE 90 UG/1
1 AEROSOL, METERED RESPIRATORY (INHALATION) EVERY 4 HOURS PRN
COMMUNITY
End: 2018-04-10

## 2017-11-20 RX ORDER — OXYCODONE HCL 10 MG/1
10 TABLET, FILM COATED, EXTENDED RELEASE ORAL
Status: COMPLETED | OUTPATIENT
Start: 2017-11-20 | End: 2017-11-21

## 2017-11-20 RX ORDER — CLINDAMYCIN AND BENZOYL PEROXIDE 10; 50 MG/G; MG/G
GEL TOPICAL 2 TIMES DAILY
COMMUNITY
End: 2018-10-14

## 2017-11-20 RX ORDER — OXYCODONE HCL 10 MG/1
10 TABLET, FILM COATED, EXTENDED RELEASE ORAL
Status: COMPLETED | OUTPATIENT
Start: 2017-11-20 | End: 2017-11-20

## 2017-11-20 RX ORDER — HYDROMORPHONE HYDROCHLORIDE 1 MG/ML
0.4 INJECTION, SOLUTION INTRAMUSCULAR; INTRAVENOUS; SUBCUTANEOUS EVERY 2 HOUR PRN
Status: DISCONTINUED | OUTPATIENT
Start: 2017-11-20 | End: 2017-11-22

## 2017-11-20 RX ORDER — ACETAMINOPHEN 325 MG/1
650 TABLET ORAL 4 TIMES DAILY
Status: DISCONTINUED | OUTPATIENT
Start: 2017-11-20 | End: 2017-11-21

## 2017-11-20 RX ORDER — DOCUSATE SODIUM 100 MG/1
100 CAPSULE, LIQUID FILLED ORAL 2 TIMES DAILY
Status: DISCONTINUED | OUTPATIENT
Start: 2017-11-20 | End: 2017-11-22

## 2017-11-20 RX ORDER — SODIUM PHOSPHATE, DIBASIC AND SODIUM PHOSPHATE, MONOBASIC 7; 19 G/133ML; G/133ML
1 ENEMA RECTAL ONCE AS NEEDED
Status: DISCONTINUED | OUTPATIENT
Start: 2017-11-20 | End: 2017-11-22

## 2017-11-20 RX ORDER — HYDROMORPHONE HYDROCHLORIDE 1 MG/ML
0.8 INJECTION, SOLUTION INTRAMUSCULAR; INTRAVENOUS; SUBCUTANEOUS EVERY 2 HOUR PRN
Status: DISCONTINUED | OUTPATIENT
Start: 2017-11-20 | End: 2017-11-22

## 2017-11-20 NOTE — ANESTHESIA POSTPROCEDURE EVALUATION
[facility] Patient Status:  Surgery Admit   Age/Gender 45year old female MRN [id number]   Location [address] [facility] Attending Bethel Osborne MD   Hosp Day # 0 PCP Alysha Zepeda MD       Anesthesia Post

## 2017-11-20 NOTE — PROGRESS NOTES
NURSING ADMISSION NOTE      Patient admitted via bed from PACU post left total hip replacement. Oriented to room. Received awake, alert and oriented x 3. Safety precautions initiated. Patient accompanied by her . Bed in low position.   Call

## 2017-11-20 NOTE — H&P
H&P by Dr. Ml Perkins dated 10/25/17 was reviewed. No changes noted. Plan is to proceed with a left total hip arthroplasty for severe DJD left hip. Risks and benefits were discussed and patient agrees to proceed with surgery.

## 2017-11-20 NOTE — BRIEF OP NOTE
Pre-Operative Diagnosis: LEFT HIP OSTEOARTHRITIS      Post-Operative Diagnosis: LEFT HIP OSTEOARTHRITIS      Procedure Performed:   Procedure(s):  LEFT TOTAL HIP ARTHROPLASTY     Surgeon(s) and Role:     Fredis Scherer MD - Primary    Assistant(

## 2017-11-20 NOTE — CONSULTS
[address] Patient Status:  Inpatient    1965 MRN [id number]   [facility] 3SW-A Attending Ruel Maya MD   Hosp Day # 0 PCP Yvonne Lane MD     Reason for consult: medical management Take 1 tablet (200 mcg total) by mouth once daily. Disp: 90 tablet Rfl: 2   MedroxyPROGESTERone Acetate 2.5 MG Oral Tab Take 1 tablet (2.5 mg total) by mouth once daily. Disp:  Rfl:    PREMARIN 0.625 MG Oral Tab Take 0.625 mg by mouth once daily.  Disp:  Rf abuse/nicotine dependence   1. Declines nicotine patch  2. Cessation advised  5. Obesity  1. BMI 33      Quality:  · DVT Prophylaxis: xarelto  · CODE status: full  · Hoff: yes     Plan of care discussed with patient and family.     Anjum Taylor,   11/20/

## 2017-11-20 NOTE — ANESTHESIA PREPROCEDURE EVALUATION
PRE-OP EVALUATION    Patient Name: Sudha Arreguin    Pre-op Diagnosis: LEFT HIP OSTEOARTHRITIS     Procedure(s):  LEFT TOTAL HIP ARTHROPLASTY     Surgeon(s) and Role:     Fernandez Rosa MD - Primary    Pre-op vitals reviewed.   Temp: 98 °F (36.7 °C) allergies. Anesthesia Evaluation    Patient summary reviewed. Anesthetic Complications           GI/Hepatic/Renal    Negative GI/hepatic/renal ROS.                              Cardiovascular                     (+) hyperlipidemia verified and patient meets guidelines. Post-procedure pain management plan discussed with surgeon and patient. Surgeon requests: regional block  Comment: Discussed spinal and fascia iliaca block.   Plan/risks discussed with: patient                Prese

## 2017-11-21 PROCEDURE — 99232 SBSQ HOSP IP/OBS MODERATE 35: CPT | Performed by: STUDENT IN AN ORGANIZED HEALTH CARE EDUCATION/TRAINING PROGRAM

## 2017-11-21 RX ORDER — OXYCODONE HYDROCHLORIDE 15 MG/1
15 TABLET ORAL EVERY 4 HOURS PRN
Status: ACTIVE | OUTPATIENT
Start: 2017-11-21 | End: 2017-11-22

## 2017-11-21 RX ORDER — HYDROCODONE BITARTRATE AND ACETAMINOPHEN 5; 325 MG/1; MG/1
1 TABLET ORAL EVERY 4 HOURS PRN
Status: DISCONTINUED | OUTPATIENT
Start: 2017-11-22 | End: 2017-11-22

## 2017-11-21 RX ORDER — TRAMADOL HYDROCHLORIDE 50 MG/1
50 TABLET ORAL EVERY 4 HOURS PRN
Status: DISCONTINUED | OUTPATIENT
Start: 2017-11-22 | End: 2017-11-22

## 2017-11-21 RX ORDER — OXYCODONE HYDROCHLORIDE 10 MG/1
10 TABLET ORAL EVERY 4 HOURS PRN
Status: DISPENSED | OUTPATIENT
Start: 2017-11-21 | End: 2017-11-22

## 2017-11-21 RX ORDER — OXYCODONE HYDROCHLORIDE 5 MG/1
5 TABLET ORAL EVERY 4 HOURS PRN
Status: DISPENSED | OUTPATIENT
Start: 2017-11-21 | End: 2017-11-22

## 2017-11-21 RX ORDER — ACETAMINOPHEN 325 MG/1
650 TABLET ORAL 4 TIMES DAILY
Status: COMPLETED | OUTPATIENT
Start: 2017-11-21 | End: 2017-11-21

## 2017-11-21 RX ORDER — PSEUDOEPHEDRINE HCL 30 MG
100 TABLET ORAL 2 TIMES DAILY
Qty: 30 CAPSULE | Refills: 0 | Status: SHIPPED | OUTPATIENT
Start: 2017-11-21 | End: 2018-01-23 | Stop reason: ALTCHOICE

## 2017-11-21 RX ORDER — ALBUTEROL SULFATE 90 UG/1
1 AEROSOL, METERED RESPIRATORY (INHALATION) EVERY 4 HOURS PRN
Status: DISCONTINUED | OUTPATIENT
Start: 2017-11-21 | End: 2017-11-22

## 2017-11-21 RX ORDER — HYDROCODONE BITARTRATE AND ACETAMINOPHEN 5; 325 MG/1; MG/1
1-2 TABLET ORAL EVERY 6 HOURS PRN
Qty: 50 TABLET | Refills: 0 | Status: SHIPPED | OUTPATIENT
Start: 2017-11-21 | End: 2018-01-23 | Stop reason: ALTCHOICE

## 2017-11-21 RX ORDER — HYDROCODONE BITARTRATE AND ACETAMINOPHEN 5; 325 MG/1; MG/1
2 TABLET ORAL EVERY 4 HOURS PRN
Status: DISCONTINUED | OUTPATIENT
Start: 2017-11-22 | End: 2017-11-22

## 2017-11-21 NOTE — PLAN OF CARE
DISCHARGE PLANNING    • Discharge to home or other facility with appropriate resources Progressing        PAIN - ADULT    • Verbalizes/displays adequate comfort level or patient's stated pain goal Progressing        Patient/Family Goals    • Patient/Family The right coronary artery was selectively engaged and injected. A Catheter  Jr Cor 4 Crv 6fr 100cm Supertorque + was used. Multiple views of the injected vessel were taken.

## 2017-11-21 NOTE — PHYSICAL THERAPY NOTE
PHYSICAL THERAPY HIP EVALUATION - INPATIENT     Room Number: 386/386-A  Evaluation Date: 11/21/2017  Type of Evaluation: Initial  Physician Order: PT Eval and Treat    Presenting Problem: L JAKI ; posterior approach  Reason for Therapy: Mobility Dysfunction L Lower Extremity: Weight Bearing as Tolerated    PAIN ASSESSMENT  Ratin  Location: left hip  Management Techniques:  Body mechanics    COGNITION  · Overall Cognitive Status:  WFL - within functional limits    RANGE OF MOTION AND STRENGTH ASSESS rolling walker and supervision with decreased stance on left LE and step to gait pattern. Able to perform toilet transfer with supervision and cues to slide L LE out prior to standing. Able to go from sit to supine with supervision while maintaining THP's. device at assistance level: modified independent    Goal #4    Patient will negotiate 12 stairs/one curb w/ assistive device and supervision   Goal #5    Patient verbalizes and/or demonstrates all precautions and safety concerns independently   Goal #6

## 2017-11-21 NOTE — PROGRESS NOTES
Jamie Sorto Patient Status:  Inpatient    1965 MRN [id number]   [facility] 3SW-A Attending Bull Hylton MD   Hosp Day # 1 PCP Lyla Redman MD     Subjective:  Post-Operative Day: 1 Status Post left Total

## 2017-11-21 NOTE — PHYSICAL THERAPY NOTE
PHYSICAL THERAPY HIP TREATMENT NOTE - INPATIENT      Room Number: 386/386-A     Session: 1 and 2  Number of Visits to Meet Established Goals: 5    Presenting Problem: L JAKI ; posterior approach    Problem List  Active Problems:    Preop testing    Clara Palacios -   Sitting down on and standing up from a chair with arms (e.g., wheelchair, bedside commode, etc.): A Little   -   Moving from lying on back to sitting on the side of the bed?: A Little   How much help from another person does the patient currently nee and rail. Able to negotiate curb step with walker and supervision. Occasional cues for sequencing.                 Exercises AM Session PM Session   Ankle Pumps     10 reps 15 reps   Quad Sets 10 reps 15 reps   Glut Sets 10 reps 15 reps   Hip Abd/Add 10 rep

## 2017-11-21 NOTE — PLAN OF CARE
PAIN - ADULT    • Verbalizes/displays adequate comfort level or patient's stated pain goal Not Progressing          SAFETY ADULT - FALL    • Free from fall injury Progressing        Clarified hip precaution with Dr. Cecile Davis, patient on posterior hip preca

## 2017-11-21 NOTE — CM/SW NOTE
Call from [facility] with [address] they cannot accept pt at this time, do not have therapist in pts area. Referral sent via [facility] to [place] as they do service this area [phone]. Await response.

## 2017-11-21 NOTE — OPERATIVE REPORT
[address]    PATIENT'S NAME: Kevin Dior   ATTENDING PHYSICIAN: Ole Muller M.D. OPERATING PHYSICIAN: Ole Muller M.D.    PATIENT ACCOUNT#:   [de-identified]    LOCATION:  [address] [place] [address]  MEDICAL RECORD #:   [id number]       DATE OF BIRTH measured in reference to the vastus ridge and were in line with each other. These were capped for later leg length determination. The patient's short external rotators were released off the posterior aspect of the femoral neck region.   The piriformis was The x-rays were obtained at this time just to see the position of the cup. The x-ray determined that the cup was more vertical than desired; therefore, the trials were removed and the cup was repositioned slightly more abduction.   The screws were then sheridan

## 2017-11-21 NOTE — PROGRESS NOTES
[facility]  Progress Note     Josue Montana Patient Status:  Inpatient    1965 MRN [id number]   [facility] 3SW-A Attending Tahir Barton MD   Hosp Day # 1 PCP Renata Asif MD     Chief Complaint: Medical mgt     Lorin Fam 1. Left hip OA s/p left THR 11/20  1. Management per ortho  2. PT/OT  3. Pain control  2. Hypothyroidism  1. Synthroid  3. Depression  1. Continue lexapro   4. Tobacco abuse/nicotine dependence   1. Declined nicotine patch  5. Obesity  1.  BMI 33      Q

## 2017-11-21 NOTE — OCCUPATIONAL THERAPY NOTE
OCCUPATIONAL THERAPY QUICK EVALUATION - INPATIENT    Room Number: 386/386-A  Evaluation Date: 11/21/2017     Type of Evaluation: Quick Eval  Presenting Problem:  (L JAKI)    Physician Order: IP Consult to Occupational Therapy  Reason for Therapy:  ADL/IADL is to heal    OBJECTIVE  Precautions: JAKI - posterior;Hip abduction pillow;Drain(s)  Fall Risk: Standard fall risk    WEIGHT BEARING RESTRICTION  Weight Bearing Restriction: L lower extremity           L Lower Extremity: Weight Bearing as Tolerated    PAIN educated them on home safety for initial shower. Understanding voiced. Patient End of Session: Up in chair;Needs met;Call light within reach;RN aware of session/findings; All patient questions and concerns addressed; Family present    ASSESSMENT     [place] ADLS: safely with supervision

## 2017-11-21 NOTE — CM/SW NOTE
11/21/17 1100   CM/SW Referral Data   Referral Source Physician   Reason for Referral Discharge planning   Informant Patient;Spouse   Pertinent Medical Hx   Primary Care Physician Name Lobito Lackey   Patient Info   Patient's Mental Status Alert;Oriente

## 2017-11-22 VITALS
OXYGEN SATURATION: 95 % | WEIGHT: 209.44 LBS | BODY MASS INDEX: 32.87 KG/M2 | TEMPERATURE: 98 F | HEIGHT: 67 IN | SYSTOLIC BLOOD PRESSURE: 129 MMHG | RESPIRATION RATE: 18 BRPM | HEART RATE: 78 BPM | DIASTOLIC BLOOD PRESSURE: 74 MMHG

## 2017-11-22 PROCEDURE — 99232 SBSQ HOSP IP/OBS MODERATE 35: CPT | Performed by: STUDENT IN AN ORGANIZED HEALTH CARE EDUCATION/TRAINING PROGRAM

## 2017-11-22 NOTE — DISCHARGE SUMMARY
Discharge Summary  Patient ID:  Casey Bowman  [id number]  30 year old  7/7/1965    Admit date: 11/20/2017    Discharge date and time: 11/22/17    Attending Physician: Bull Hylton MD     Reason for admission: Post op pain and medical care    Disch Sky Slater MD in 2 weeks.   Follow-up with labs: PT/INR with the medical physician that followed you in the hospital.      Signed:  Sky Slater MD  11/22/2017  1:02 PM

## 2017-11-22 NOTE — PROGRESS NOTES
Patient discharged to home this afternoon. Reviewed all discharge materials with patient and spouse at bedside, all questions answered at this time. Verbalized understanding of teaching. Script for norco provided to patient at this time.    Patient escorted

## 2017-11-22 NOTE — PROGRESS NOTES
Acute Pain Service    Post Op Day 2 Ortho Note    Patient taking Norco to manage pain; denies itching/nausea/dizziness. Patient able to bear weight on sx leg; equal sensation in BLE. No further recommendations. Will sign-off.       Plan discussed with/by

## 2017-11-22 NOTE — PROGRESS NOTES
[facility]  Progress Note     Nani Kellie Patient Status:  Inpatient    1965 MRN [id number]   [facility] 3SW-A Attending Radha Berry MD   Hosp Day # 2 PCP Sondra Posadas MD     Chief Complaint: Medical mgt     Val Aragon 4. Tobacco abuse/nicotine dependence   1. Declined nicotine patch  5. Obesity  1.  BMI 33  Pt cleared for d/c from IM perspectiv  meds reconciled    Quality:  · DVT Prophylaxis: Xarelto   · CODE status: full code   · Hoff: no  · Central line: no    Estim

## 2017-11-22 NOTE — CM/SW NOTE
11/22/17 1427   Discharge disposition   Discharged to: Home-Health   Name of Carlos Acharya 67   Discharge transportation Private car

## 2017-11-22 NOTE — PHYSICAL THERAPY NOTE
PHYSICAL THERAPY HIP TREATMENT NOTE - INPATIENT      Room Number: 386/386-A     Session: 3  Number of Visits to Meet Established Goals: 5    Presenting Problem: L JAKI ; posterior approach    Problem List  Active Problems:    Preop testing    Depression Sitting down on and standing up from a chair with arms (e.g., wheelchair, bedside commode, etc.): None   -   Moving from lying on back to sitting on the side of the bed?: None   How much help from another person does the patient currently need. ..   -   Mov Comments:  Pt participated in group session, tolerance was good.    was present yes   is a spouse    Patient End of Session: Up in chair;Needs met;RN aware of session/findings    ASSESSMENT   Increased gait distance and requiring

## 2017-11-22 NOTE — PROGRESS NOTES
Jamie Sorto Patient Status:  Inpatient    1965 MRN [id number]   [facility] 3SW-A Attending Jazlyn Barajas MD   Hosp Day # 2 PCP Lenoard Leyden, MD     Subjective:  Post-Operative Day: 2 Status Post left Total

## 2017-11-24 ENCOUNTER — PATIENT OUTREACH (OUTPATIENT)
Dept: CASE MANAGEMENT | Age: 52
End: 2017-11-24

## 2017-12-06 NOTE — PROGRESS NOTES
Multiple attempts to reach the pt and messages left with no returned phone call. Past TCM timeframe. Closing encounter.

## 2018-01-03 NOTE — TELEPHONE ENCOUNTER
I'm a bit confused. Why is she not requesting pain meds from the Ortho who did the surgery in 11/17?

## 2018-01-05 NOTE — TELEPHONE ENCOUNTER
Received Fax medication request from pharmacy. Did not send in refill. LM for pt to call back- see notes from Dr. Johanna Schwartz below.

## 2018-01-08 RX ORDER — TRAMADOL HYDROCHLORIDE 50 MG/1
TABLET ORAL
Qty: 21 TABLET | Refills: 0
Start: 2018-01-08

## 2018-01-08 NOTE — TELEPHONE ENCOUNTER
Received refill request from [facility]. Left message again for patient to call. [facility] advised.

## 2018-01-23 ENCOUNTER — NURSE ONLY (OUTPATIENT)
Dept: FAMILY MEDICINE CLINIC | Facility: CLINIC | Age: 53
End: 2018-01-23

## 2018-01-23 DIAGNOSIS — E78.5 HYPERLIPIDEMIA, UNSPECIFIED HYPERLIPIDEMIA TYPE: ICD-10-CM

## 2018-01-23 DIAGNOSIS — E03.9 HYPOTHYROIDISM, UNSPECIFIED TYPE: ICD-10-CM

## 2018-01-23 LAB
BUN BLD-MCNC: 16 MG/DL (ref 8–20)
CALCIUM BLD-MCNC: 9.2 MG/DL (ref 8.3–10.3)
CHLORIDE: 106 MMOL/L (ref 101–111)
CHOLEST SMN-MCNC: 213 MG/DL (ref ?–200)
CO2: 29 MMOL/L (ref 22–32)
CREAT BLD-MCNC: 0.73 MG/DL (ref 0.55–1.02)
FREE T4: 1.5 NG/DL (ref 0.9–1.8)
GLUCOSE BLD-MCNC: 138 MG/DL (ref 70–99)
HDLC SERPL-MCNC: 40 MG/DL (ref 45–?)
HDLC SERPL: 5.33 {RATIO} (ref ?–4.44)
LDLC SERPL CALC-MCNC: 143 MG/DL (ref ?–130)
NONHDLC SERPL-MCNC: 173 MG/DL (ref ?–130)
POTASSIUM SERPL-SCNC: 5 MMOL/L (ref 3.6–5.1)
SODIUM SERPL-SCNC: 140 MMOL/L (ref 136–144)
TRIGL SERPL-MCNC: 152 MG/DL (ref ?–150)
TSI SER-ACNC: 3.59 MIU/ML (ref 0.35–5.5)
VLDLC SERPL CALC-MCNC: 30 MG/DL (ref 5–40)

## 2018-01-23 PROCEDURE — 80048 BASIC METABOLIC PNL TOTAL CA: CPT | Performed by: FAMILY MEDICINE

## 2018-01-23 PROCEDURE — 84439 ASSAY OF FREE THYROXINE: CPT | Performed by: FAMILY MEDICINE

## 2018-01-23 PROCEDURE — 84443 ASSAY THYROID STIM HORMONE: CPT | Performed by: FAMILY MEDICINE

## 2018-01-23 PROCEDURE — 80061 LIPID PANEL: CPT | Performed by: FAMILY MEDICINE

## 2018-01-23 PROCEDURE — 36415 COLL VENOUS BLD VENIPUNCTURE: CPT | Performed by: FAMILY MEDICINE

## 2018-01-29 ENCOUNTER — TELEPHONE (OUTPATIENT)
Dept: FAMILY MEDICINE CLINIC | Facility: CLINIC | Age: 53
End: 2018-01-29

## 2018-01-29 RX ORDER — ALBUTEROL SULFATE 90 UG/1
AEROSOL, METERED RESPIRATORY (INHALATION)
Qty: 25.5 G | Refills: 5 | Status: CANCELLED | OUTPATIENT
Start: 2018-01-29
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
## 2018-01-29 NOTE — TELEPHONE ENCOUNTER
Left message for patient to call office back. Office phone number provided. Proair was refilled today to [facility]. Does patient want this moved to [facility]?

## 2018-02-20 ENCOUNTER — TELEPHONE (OUTPATIENT)
Dept: FAMILY MEDICINE CLINIC | Facility: CLINIC | Age: 53
End: 2018-02-20

## 2018-02-20 DIAGNOSIS — E03.8 OTHER SPECIFIED HYPOTHYROIDISM: ICD-10-CM

## 2018-02-20 DIAGNOSIS — Z13.220 LIPID SCREENING: ICD-10-CM

## 2018-02-20 DIAGNOSIS — F98.8 ATTENTION DEFICIT DISORDER (ADD) WITHOUT HYPERACTIVITY: ICD-10-CM

## 2018-02-20 DIAGNOSIS — M25.552 LEFT HIP PAIN: ICD-10-CM

## 2018-02-20 DIAGNOSIS — F51.01 PRIMARY INSOMNIA: ICD-10-CM

## 2018-02-20 RX ORDER — DEXTROAMPHETAMINE SACCHARATE, AMPHETAMINE ASPARTATE MONOHYDRATE, DEXTROAMPHETAMINE SULFATE AND AMPHETAMINE SULFATE 7.5; 7.5; 7.5; 7.5 MG/1; MG/1; MG/1; MG/1
30 CAPSULE, EXTENDED RELEASE ORAL DAILY
Qty: 30 CAPSULE | Refills: 0 | Status: SHIPPED | OUTPATIENT
Start: 2018-02-20 | End: 2018-06-04

## 2018-02-20 RX ORDER — DEXTROAMPHETAMINE SACCHARATE, AMPHETAMINE ASPARTATE, DEXTROAMPHETAMINE SULFATE AND AMPHETAMINE SULFATE 1.25; 1.25; 1.25; 1.25 MG/1; MG/1; MG/1; MG/1
5 TABLET ORAL DAILY
Qty: 30 TABLET | Refills: 0 | Status: SHIPPED | OUTPATIENT
Start: 2018-03-19 | End: 2018-09-10

## 2018-02-20 RX ORDER — DEXTROAMPHETAMINE SACCHARATE, AMPHETAMINE ASPARTATE, DEXTROAMPHETAMINE SULFATE AND AMPHETAMINE SULFATE 1.25; 1.25; 1.25; 1.25 MG/1; MG/1; MG/1; MG/1
5 TABLET ORAL DAILY
Qty: 30 TABLET | Refills: 0 | Status: SHIPPED | OUTPATIENT
Start: 2018-04-18 | End: 2018-09-10

## 2018-02-20 RX ORDER — DEXTROAMPHETAMINE SACCHARATE, AMPHETAMINE ASPARTATE MONOHYDRATE, DEXTROAMPHETAMINE SULFATE AND AMPHETAMINE SULFATE 7.5; 7.5; 7.5; 7.5 MG/1; MG/1; MG/1; MG/1
30 CAPSULE, EXTENDED RELEASE ORAL DAILY
Qty: 30 CAPSULE | Refills: 0 | Status: SHIPPED | OUTPATIENT
Start: 2018-03-19 | End: 2018-06-04

## 2018-02-20 RX ORDER — TRAMADOL HYDROCHLORIDE 50 MG/1
50 TABLET ORAL EVERY 6 HOURS PRN
Qty: 100 TABLET | Refills: 0 | Status: SHIPPED
Start: 2018-02-20 | End: 2018-03-12

## 2018-02-20 RX ORDER — DEXTROAMPHETAMINE SACCHARATE, AMPHETAMINE ASPARTATE, DEXTROAMPHETAMINE SULFATE AND AMPHETAMINE SULFATE 1.25; 1.25; 1.25; 1.25 MG/1; MG/1; MG/1; MG/1
5 TABLET ORAL DAILY
Qty: 30 TABLET | Refills: 0 | Status: SHIPPED | OUTPATIENT
Start: 2018-02-20 | End: 2018-09-10

## 2018-02-20 RX ORDER — DEXTROAMPHETAMINE SACCHARATE, AMPHETAMINE ASPARTATE MONOHYDRATE, DEXTROAMPHETAMINE SULFATE AND AMPHETAMINE SULFATE 7.5; 7.5; 7.5; 7.5 MG/1; MG/1; MG/1; MG/1
30 CAPSULE, EXTENDED RELEASE ORAL DAILY
Qty: 30 CAPSULE | Refills: 0 | Status: SHIPPED | OUTPATIENT
Start: 2018-04-18 | End: 2018-06-04

## 2018-02-22 ENCOUNTER — TELEPHONE (OUTPATIENT)
Dept: FAMILY MEDICINE CLINIC | Facility: CLINIC | Age: 53
End: 2018-02-22

## 2018-03-12 RX ORDER — TRAMADOL HYDROCHLORIDE 50 MG/1
50 TABLET ORAL EVERY 6 HOURS PRN
Qty: 100 TABLET | Refills: 0 | Status: SHIPPED
Start: 2018-03-12 | End: 2018-04-04

## 2018-04-04 ENCOUNTER — TELEPHONE (OUTPATIENT)
Dept: FAMILY MEDICINE CLINIC | Facility: CLINIC | Age: 53
End: 2018-04-04

## 2018-04-04 RX ORDER — TRAMADOL HYDROCHLORIDE 50 MG/1
50 TABLET ORAL EVERY 6 HOURS PRN
Qty: 100 TABLET | Refills: 0 | Status: SHIPPED
Start: 2018-04-04 | End: 2018-05-07

## 2018-04-10 RX ORDER — ESCITALOPRAM OXALATE 20 MG/1
TABLET ORAL
Qty: 90 TABLET | Refills: 2 | Status: SHIPPED | OUTPATIENT
Start: 2018-04-10 | End: 2018-09-10 | Stop reason: ALTCHOICE

## 2018-04-10 RX ORDER — LEVOTHYROXINE SODIUM 0.2 MG/1
TABLET ORAL
Qty: 90 TABLET | Refills: 2 | Status: SHIPPED | OUTPATIENT
Start: 2018-04-10 | End: 2019-01-06

## 2018-04-10 NOTE — TELEPHONE ENCOUNTER
Last OV 10/25/17  Last lab 1/23/18  TSH 3.590, FT4 1.5  Both meds las refilled 7/14/17 #90  2 refills

## 2018-05-07 RX ORDER — TRAMADOL HYDROCHLORIDE 50 MG/1
50 TABLET ORAL EVERY 6 HOURS PRN
Qty: 100 TABLET | Refills: 0 | Status: SHIPPED
Start: 2018-05-07 | End: 2018-06-01

## 2018-05-07 NOTE — TELEPHONE ENCOUNTER
Please let patient know or leave message that her Tramadol was refilled. It has been > 6 months since last office visit. This medication is considered a controlled medication.  We need to see her at least every 6 months on the medication since new guideline

## 2018-05-07 NOTE — TELEPHONE ENCOUNTER
Last refilled on 4/4/18 for # 100 with 0 refills  Last seen on 10/25/17  No future appointments. Thank you.

## 2018-05-07 NOTE — TELEPHONE ENCOUNTER
Patient advised. Verbalized understanding.  Advised she will call back to make appt with Dr Miya Rivera

## 2018-06-01 RX ORDER — TRAMADOL HYDROCHLORIDE 50 MG/1
50 TABLET ORAL EVERY 6 HOURS PRN
Qty: 100 TABLET | Refills: 1 | Status: SHIPPED
Start: 2018-06-01 | End: 2018-07-26

## 2018-06-04 DIAGNOSIS — Z13.220 LIPID SCREENING: ICD-10-CM

## 2018-06-04 DIAGNOSIS — F51.01 PRIMARY INSOMNIA: ICD-10-CM

## 2018-06-04 DIAGNOSIS — F98.8 ATTENTION DEFICIT DISORDER (ADD) WITHOUT HYPERACTIVITY: ICD-10-CM

## 2018-06-04 DIAGNOSIS — M25.552 LEFT HIP PAIN: ICD-10-CM

## 2018-06-04 DIAGNOSIS — E03.8 OTHER SPECIFIED HYPOTHYROIDISM: ICD-10-CM

## 2018-06-04 RX ORDER — DEXTROAMPHETAMINE SACCHARATE, AMPHETAMINE ASPARTATE MONOHYDRATE, DEXTROAMPHETAMINE SULFATE AND AMPHETAMINE SULFATE 7.5; 7.5; 7.5; 7.5 MG/1; MG/1; MG/1; MG/1
30 CAPSULE, EXTENDED RELEASE ORAL DAILY
Qty: 30 CAPSULE | Refills: 0 | Status: SHIPPED | OUTPATIENT
Start: 2018-07-03 | End: 2018-09-10

## 2018-06-04 RX ORDER — DEXTROAMPHETAMINE SACCHARATE, AMPHETAMINE ASPARTATE MONOHYDRATE, DEXTROAMPHETAMINE SULFATE AND AMPHETAMINE SULFATE 7.5; 7.5; 7.5; 7.5 MG/1; MG/1; MG/1; MG/1
30 CAPSULE, EXTENDED RELEASE ORAL DAILY
Qty: 30 CAPSULE | Refills: 0 | Status: SHIPPED | OUTPATIENT
Start: 2018-06-04 | End: 2018-09-10

## 2018-06-04 RX ORDER — DEXTROAMPHETAMINE SACCHARATE, AMPHETAMINE ASPARTATE, DEXTROAMPHETAMINE SULFATE AND AMPHETAMINE SULFATE 1.25; 1.25; 1.25; 1.25 MG/1; MG/1; MG/1; MG/1
5 TABLET ORAL DAILY
Qty: 30 TABLET | Refills: 0 | Status: CANCELLED | OUTPATIENT
Start: 2018-06-04 | End: 2018-07-04

## 2018-06-04 RX ORDER — DEXTROAMPHETAMINE SACCHARATE, AMPHETAMINE ASPARTATE MONOHYDRATE, DEXTROAMPHETAMINE SULFATE AND AMPHETAMINE SULFATE 7.5; 7.5; 7.5; 7.5 MG/1; MG/1; MG/1; MG/1
30 CAPSULE, EXTENDED RELEASE ORAL DAILY
Qty: 30 CAPSULE | Refills: 0 | Status: SHIPPED | OUTPATIENT
Start: 2018-08-02 | End: 2018-09-10

## 2018-06-04 NOTE — TELEPHONE ENCOUNTER
Last refill: 4/18/2018 #30 with 0 refills  Last Visit: 10/25/2017  Next Visit: No future appointments. Forward to Dr. Alejandro Marrero please advise on refills. Thanks.

## 2018-06-04 NOTE — TELEPHONE ENCOUNTER
Left detailed message on voicemail. Advised pt to call back to schedule appt. Forward to [employer] office, pt to  rx for Adderall.

## 2018-06-04 NOTE — TELEPHONE ENCOUNTER
Medication prescription is ready for . With new prescribing guidelines I will need to see her back every 6 months for follow up on this controlled substance. Please have her make an appt in the next month or so.   Thanks

## 2018-07-11 RX ORDER — TRAMADOL HYDROCHLORIDE 50 MG/1
50 TABLET ORAL EVERY 6 HOURS PRN
Qty: 100 TABLET | Refills: 1 | Status: CANCELLED | OUTPATIENT
Start: 2018-07-11

## 2018-07-11 NOTE — TELEPHONE ENCOUNTER
Left message on patients voice mail - inquiring about the requested information from Dr. Julianna Tee

## 2018-07-26 RX ORDER — TRAMADOL HYDROCHLORIDE 50 MG/1
50 TABLET ORAL EVERY 6 HOURS PRN
Qty: 100 TABLET | Refills: 1 | Status: SHIPPED
Start: 2018-07-26 | End: 2018-09-20

## 2018-07-26 NOTE — TELEPHONE ENCOUNTER
Last refilled on 6/1/18 for # 100 with 1 rf. Last seen on 10/25/17. No future appointments. Thank you.

## 2018-09-04 NOTE — TELEPHONE ENCOUNTER
Last OV 10/25/17  Last refilled 1/29/18  25.5 g  5 refills    Last refilled to [facility] but [facility] requesting refill.     Patient notified via detailed voicemail left at cell number (ok per  HIPAA consent)  Asked patient to call back and let offic

## 2018-09-05 ENCOUNTER — TELEPHONE (OUTPATIENT)
Dept: FAMILY MEDICINE CLINIC | Facility: CLINIC | Age: 53
End: 2018-09-05

## 2018-09-05 ENCOUNTER — APPOINTMENT (OUTPATIENT)
Dept: OTHER | Age: 53
End: 2018-09-05
Attending: FAMILY MEDICINE

## 2018-09-05 NOTE — TELEPHONE ENCOUNTER
Left message on patients voice mail with the information. Phone number provided to patient to call and make an appointment.

## 2018-09-05 NOTE — TELEPHONE ENCOUNTER
She has not been seen in almost one year. New prescribing guidelines require us to monitor patients more frequently that take controlled substances. I will need to see her every 6 months.   Please schedule her an appt  Thanks

## 2018-09-07 ENCOUNTER — APPOINTMENT (OUTPATIENT)
Dept: OTHER | Age: 53
End: 2018-09-07
Attending: FAMILY MEDICINE

## 2018-09-10 NOTE — PROGRESS NOTES
Sudha Arreguin is a 52year old female. CC:  Patient presents with:  ADD: medication follow up      HPI:  The patient is here for follow up medication use for ADD/ADHD.   Concentration: good  Organization abilities:good  Mood: stable    Side effects of Amphetamine-Dextroamphet ER (ADDERALL XR) 30 MG Oral Capsule SR 24 Hr Take 1 capsule (30 mg total) by mouth daily. Fill after 28 days.  Disp: 30 capsule Rfl: 0   [START ON 11/8/2018] Amphetamine-Dextroamphet ER (ADDERALL XR) 30 MG Oral Capsule SR 24 Hr Take Hypothyroid    • Osteoarthritis    • Reactive airway disease    • Visual impairment     reading glasses      Past Surgical History:  No date:   No date:       Comment:  X2  No date: [facility] BIOPSY STEREOTACTIC NODULE 2 SITE BILAT  No date: OT months    2. Anxiety  D/c Lexapro as she may be having a tachyphylaxis issue  Start Celexa 20 mg qd  She is to contact the office in 6 weeks with an update. Orders for this visit:  No orders of the defined types were placed in this encounter.       Non (30 mg total) by mouth daily. Fill after 28 days. • Amphetamine-Dextroamphet ER (ADDERALL XR) 30 MG Oral Capsule SR 24 Hr 30 capsule 0     Sig: Take 1 capsule (30 mg total) by mouth daily. Please fill after 58 days.    • amphetamine-dextroamphetamine (ADD

## 2018-09-12 ENCOUNTER — APPOINTMENT (OUTPATIENT)
Dept: OTHER | Age: 53
End: 2018-09-12
Attending: FAMILY MEDICINE

## 2018-09-14 ENCOUNTER — APPOINTMENT (OUTPATIENT)
Dept: OTHER | Age: 53
End: 2018-09-14
Attending: FAMILY MEDICINE

## 2018-09-20 RX ORDER — ALBUTEROL SULFATE 90 UG/1
AEROSOL, METERED RESPIRATORY (INHALATION)
Qty: 3 INHALER | Refills: 3 | Status: SHIPPED | OUTPATIENT
Start: 2018-09-20 | End: 2019-09-09

## 2018-09-20 RX ORDER — CITALOPRAM 20 MG/1
20 TABLET ORAL DAILY
Qty: 90 TABLET | Refills: 3 | Status: SHIPPED | OUTPATIENT
Start: 2018-09-20 | End: 2019-10-20

## 2018-09-20 RX ORDER — TRAMADOL HYDROCHLORIDE 50 MG/1
50 TABLET ORAL EVERY 6 HOURS PRN
Qty: 100 TABLET | Refills: 1 | Status: SHIPPED
Start: 2018-09-20 | End: 2018-10-30

## 2018-09-20 NOTE — TELEPHONE ENCOUNTER
Fax from [facility] for refill of citalopram and proair and tramadol 90 day supply    LRF 9/10/18  Citalopram#30 with 3 refills  proair- 1/29/18 # 25.5g with 5  Tramadol  7/26/18 #100 with 1 refill    LOV 9/10/18

## 2018-10-14 RX ORDER — CLINDAMYCIN AND BENZOYL PEROXIDE 10; 50 MG/G; MG/G
GEL TOPICAL
Qty: 100 G | Refills: 3 | Status: SHIPPED | OUTPATIENT
Start: 2018-10-14 | End: 2020-01-22

## 2018-10-30 RX ORDER — TRAMADOL HYDROCHLORIDE 50 MG/1
50 TABLET ORAL EVERY 6 HOURS PRN
Qty: 100 TABLET | Refills: 1 | Status: SHIPPED
Start: 2018-10-30 | End: 2019-01-07

## 2018-11-23 NOTE — TELEPHONE ENCOUNTER
Last refill - 10/30/18 - #100 with 1 refill  Left message for patient to call. Does she know that she has a refill on this?

## 2018-11-27 RX ORDER — TRAMADOL HYDROCHLORIDE 50 MG/1
TABLET ORAL
Qty: 100 TABLET | Refills: 0
Start: 2018-11-27

## 2018-11-27 RX ORDER — TRAMADOL HYDROCHLORIDE 50 MG/1
50 TABLET ORAL EVERY 6 HOURS PRN
Qty: 100 TABLET | Refills: 1 | OUTPATIENT
Start: 2018-11-27

## 2018-11-27 NOTE — TELEPHONE ENCOUNTER
Last refill on Tramadol #100 with 1 refill on 10 30 2018   On 11 21 2018 patient was left a message that she had #100 with 1 refill - Does she need the refill- Patient has not returned phone call to the office regarding this request.

## 2018-11-27 NOTE — TELEPHONE ENCOUNTER
Patient did not call back the office regarding this request. Another request on 11 27 2018 has been sent to the office.

## 2019-01-05 RX ORDER — TRAMADOL HYDROCHLORIDE 50 MG/1
50 TABLET ORAL EVERY 6 HOURS PRN
Qty: 100 TABLET | Refills: 1 | Status: CANCELLED
Start: 2019-01-05

## 2019-01-05 NOTE — TELEPHONE ENCOUNTER
Last refilled on 10/30/18 for # 100 with 1 refills  Last OV 9/10/18  No future appointments. Thank you.

## 2019-01-06 DIAGNOSIS — E03.9 HYPOTHYROIDISM, UNSPECIFIED TYPE: Primary | ICD-10-CM

## 2019-01-07 RX ORDER — LEVOTHYROXINE SODIUM 0.2 MG/1
TABLET ORAL
Qty: 90 TABLET | Refills: 2 | Status: SHIPPED | OUTPATIENT
Start: 2019-01-07 | End: 2019-10-06

## 2019-01-07 RX ORDER — CITALOPRAM 20 MG/1
20 TABLET ORAL DAILY
Qty: 90 TABLET | Refills: 2 | Status: SHIPPED | OUTPATIENT
Start: 2019-01-07 | End: 2020-02-14

## 2019-01-07 RX ORDER — TRAMADOL HYDROCHLORIDE 50 MG/1
50 TABLET ORAL EVERY 6 HOURS PRN
Qty: 100 TABLET | Refills: 1 | Status: SHIPPED
Start: 2019-01-07 | End: 2019-02-03

## 2019-01-07 NOTE — TELEPHONE ENCOUNTER
Left detailed message advising Dr Cari Parr' note below. Ok per [employer] form.  Advised to call to schedule appt

## 2019-01-07 NOTE — TELEPHONE ENCOUNTER
Last refilled on 4/10/18 for # 90 with 2 refills  Last TSH 3.590 on 1/23/18  Last OV 9/10/18  No future appointments. Thank you.

## 2019-01-07 NOTE — TELEPHONE ENCOUNTER
Please let patient know or leave message that her meds have been refilled  She due for thyroid labs with nursing.   Future orders placed, thanks

## 2019-01-31 NOTE — TELEPHONE ENCOUNTER
Last refilled on 9/20/18 for # 3 inh with 3 rf. Last seen on 9/10/18. No future appointments. Thank you.

## 2019-02-04 RX ORDER — TRAMADOL HYDROCHLORIDE 50 MG/1
TABLET ORAL
Qty: 100 TABLET | Refills: 0 | Status: SHIPPED
Start: 2019-02-04 | End: 2019-02-27

## 2019-02-06 ENCOUNTER — PATIENT OUTREACH (OUTPATIENT)
Dept: FAMILY MEDICINE CLINIC | Facility: CLINIC | Age: 54
End: 2019-02-06

## 2019-02-27 RX ORDER — TRAMADOL HYDROCHLORIDE 50 MG/1
TABLET ORAL
Qty: 100 TABLET | Refills: 0 | Status: SHIPPED
Start: 2019-02-27 | End: 2019-04-23

## 2019-02-27 NOTE — TELEPHONE ENCOUNTER
Last refilled on 2/4/9 for # 100 with 0 refills  Last OV 9/10/18  No future appointments. Thank you.

## 2019-04-12 ENCOUNTER — OFFICE VISIT (OUTPATIENT)
Dept: FAMILY MEDICINE CLINIC | Facility: CLINIC | Age: 54
End: 2019-04-12
Payer: COMMERCIAL

## 2019-04-12 VITALS
TEMPERATURE: 99 F | WEIGHT: 214.63 LBS | BODY MASS INDEX: 35.33 KG/M2 | HEART RATE: 88 BPM | DIASTOLIC BLOOD PRESSURE: 88 MMHG | OXYGEN SATURATION: 98 % | SYSTOLIC BLOOD PRESSURE: 138 MMHG | RESPIRATION RATE: 16 BRPM | HEIGHT: 65.5 IN

## 2019-04-12 DIAGNOSIS — E03.9 ACQUIRED HYPOTHYROIDISM: ICD-10-CM

## 2019-04-12 DIAGNOSIS — M25.561 RIGHT KNEE PAIN, UNSPECIFIED CHRONICITY: Primary | ICD-10-CM

## 2019-04-12 DIAGNOSIS — R73.9 HYPERGLYCEMIA: ICD-10-CM

## 2019-04-12 PROCEDURE — 99214 OFFICE O/P EST MOD 30 MIN: CPT | Performed by: FAMILY MEDICINE

## 2019-04-12 PROCEDURE — 83036 HEMOGLOBIN GLYCOSYLATED A1C: CPT | Performed by: FAMILY MEDICINE

## 2019-04-12 PROCEDURE — 84439 ASSAY OF FREE THYROXINE: CPT | Performed by: FAMILY MEDICINE

## 2019-04-12 PROCEDURE — 36415 COLL VENOUS BLD VENIPUNCTURE: CPT | Performed by: FAMILY MEDICINE

## 2019-04-12 PROCEDURE — 84443 ASSAY THYROID STIM HORMONE: CPT | Performed by: FAMILY MEDICINE

## 2019-04-12 NOTE — PROGRESS NOTES
Batool Luke is a 52year old female.     CC:  Patient presents with:  Knee Pain: per pt- right       HPI:  The patient has noted musculoskeletal pain:  Location: right  Knee that she describes as pain when kneeling  Duration: 2 month(s)  Injury: none Performed by Kevin Knapp DO at [facility]   • ESOPHAGOGASTRODUODENOSCOPY WITH/WITHOUT BIOPSY N/A 4/19/2017    Performed by Kevin Knapp DO at [address] [place] [address], RECTAL N/A 4/28/2017    Performed by Kevin Knapp DO at [facility] normal ambulation  Right knee exam:  No effusion present., Anterior drawer test, negative., Posterior drawer test, negative., Lateral collateral ligament intact., Medial collateral ligament intact., No lateral joint line tenderness., No medial joint line t

## 2019-04-23 RX ORDER — TRAMADOL HYDROCHLORIDE 50 MG/1
TABLET ORAL
Qty: 100 TABLET | Refills: 0 | Status: SHIPPED | OUTPATIENT
Start: 2019-04-23 | End: 2019-05-20

## 2019-04-29 ENCOUNTER — PATIENT MESSAGE (OUTPATIENT)
Dept: FAMILY MEDICINE CLINIC | Facility: CLINIC | Age: 54
End: 2019-04-29

## 2019-04-29 DIAGNOSIS — E03.8 OTHER SPECIFIED HYPOTHYROIDISM: ICD-10-CM

## 2019-04-29 DIAGNOSIS — F98.8 ADD (ATTENTION DEFICIT DISORDER): ICD-10-CM

## 2019-04-29 DIAGNOSIS — Z13.220 LIPID SCREENING: ICD-10-CM

## 2019-04-29 DIAGNOSIS — F98.8 ATTENTION DEFICIT DISORDER (ADD) WITHOUT HYPERACTIVITY: ICD-10-CM

## 2019-04-29 DIAGNOSIS — M25.552 LEFT HIP PAIN: ICD-10-CM

## 2019-04-29 DIAGNOSIS — F51.01 PRIMARY INSOMNIA: ICD-10-CM

## 2019-04-29 DIAGNOSIS — F98.8 ATTENTION DEFICIT DISORDER, UNSPECIFIED HYPERACTIVITY PRESENCE: ICD-10-CM

## 2019-04-29 RX ORDER — DEXTROAMPHETAMINE SACCHARATE, AMPHETAMINE ASPARTATE MONOHYDRATE, DEXTROAMPHETAMINE SULFATE AND AMPHETAMINE SULFATE 7.5; 7.5; 7.5; 7.5 MG/1; MG/1; MG/1; MG/1
30 CAPSULE, EXTENDED RELEASE ORAL DAILY
Qty: 30 CAPSULE | Refills: 0 | Status: SHIPPED | OUTPATIENT
Start: 2019-06-28 | End: 2019-07-28

## 2019-04-29 RX ORDER — DEXTROAMPHETAMINE SACCHARATE, AMPHETAMINE ASPARTATE, DEXTROAMPHETAMINE SULFATE AND AMPHETAMINE SULFATE 1.25; 1.25; 1.25; 1.25 MG/1; MG/1; MG/1; MG/1
5 TABLET ORAL DAILY
Qty: 30 TABLET | Refills: 0 | Status: SHIPPED | OUTPATIENT
Start: 2019-06-28 | End: 2019-07-28

## 2019-04-29 RX ORDER — DEXTROAMPHETAMINE SACCHARATE, AMPHETAMINE ASPARTATE, DEXTROAMPHETAMINE SULFATE AND AMPHETAMINE SULFATE 1.25; 1.25; 1.25; 1.25 MG/1; MG/1; MG/1; MG/1
5 TABLET ORAL DAILY
Qty: 30 TABLET | Refills: 0 | Status: SHIPPED | OUTPATIENT
Start: 2019-04-29 | End: 2019-05-29

## 2019-04-29 RX ORDER — DEXTROAMPHETAMINE SACCHARATE, AMPHETAMINE ASPARTATE MONOHYDRATE, DEXTROAMPHETAMINE SULFATE AND AMPHETAMINE SULFATE 7.5; 7.5; 7.5; 7.5 MG/1; MG/1; MG/1; MG/1
30 CAPSULE, EXTENDED RELEASE ORAL DAILY
Qty: 30 CAPSULE | Refills: 0 | Status: SHIPPED | OUTPATIENT
Start: 2019-05-28 | End: 2019-06-27

## 2019-04-29 RX ORDER — DEXTROAMPHETAMINE SACCHARATE, AMPHETAMINE ASPARTATE MONOHYDRATE, DEXTROAMPHETAMINE SULFATE AND AMPHETAMINE SULFATE 7.5; 7.5; 7.5; 7.5 MG/1; MG/1; MG/1; MG/1
30 CAPSULE, EXTENDED RELEASE ORAL DAILY
Qty: 30 CAPSULE | Refills: 0 | Status: SHIPPED | OUTPATIENT
Start: 2019-04-29 | End: 2019-05-29

## 2019-04-29 RX ORDER — DEXTROAMPHETAMINE SACCHARATE, AMPHETAMINE ASPARTATE, DEXTROAMPHETAMINE SULFATE AND AMPHETAMINE SULFATE 1.25; 1.25; 1.25; 1.25 MG/1; MG/1; MG/1; MG/1
5 TABLET ORAL DAILY
Qty: 30 TABLET | Refills: 0 | Status: SHIPPED | OUTPATIENT
Start: 2019-05-28 | End: 2019-06-27

## 2019-04-29 NOTE — TELEPHONE ENCOUNTER
From: Malcolm Roy  To: Tonja Taylor MD  Sent: 4/29/2019 1:44 PM CDT  Subject: Prescription Question    Can I get a prescription for the Adderall 30 mm and 5 mm. You can call me when ready to  at   [phone].   Thank Kasia Ramirez

## 2019-05-04 ENCOUNTER — OFFICE VISIT (OUTPATIENT)
Dept: FAMILY MEDICINE CLINIC | Facility: CLINIC | Age: 54
End: 2019-05-04
Payer: COMMERCIAL

## 2019-05-04 VITALS
RESPIRATION RATE: 20 BRPM | OXYGEN SATURATION: 99 % | SYSTOLIC BLOOD PRESSURE: 136 MMHG | BODY MASS INDEX: 35 KG/M2 | HEART RATE: 79 BPM | WEIGHT: 211 LBS | TEMPERATURE: 100 F | DIASTOLIC BLOOD PRESSURE: 78 MMHG

## 2019-05-04 DIAGNOSIS — H66.001 NON-RECURRENT ACUTE SUPPURATIVE OTITIS MEDIA OF RIGHT EAR WITHOUT SPONTANEOUS RUPTURE OF TYMPANIC MEMBRANE: Primary | ICD-10-CM

## 2019-05-04 DIAGNOSIS — R06.2 WHEEZING: ICD-10-CM

## 2019-05-04 PROCEDURE — 99214 OFFICE O/P EST MOD 30 MIN: CPT | Performed by: FAMILY MEDICINE

## 2019-05-04 RX ORDER — AMOXICILLIN AND CLAVULANATE POTASSIUM 875; 125 MG/1; MG/1
1 TABLET, FILM COATED ORAL 2 TIMES DAILY
Qty: 14 TABLET | Refills: 0 | Status: SHIPPED | OUTPATIENT
Start: 2019-05-04 | End: 2019-05-11

## 2019-05-04 NOTE — PROGRESS NOTES
HPI:   Angelique Valdes is a 52year old female who presents for upper respiratory symptoms for 4 days. Started with: seemingly mild cold sympotms, a little phlegm, a little cough, a little throat irritaiton.     Now has: 2 days ago felt like a lump stuck capsule Rfl: 0   [START ON 5/28/2019] Amphetamine-Dextroamphet ER (ADDERALL XR) 30 MG Oral Capsule SR 24 Hr Take 1 capsule (30 mg total) by mouth daily. Please fill this prescription after 28 days.  Disp: 30 capsule Rfl: 0   [START ON 6/28/2019] Amphetamine 20.00        Pack years: 20      Smokeless tobacco: Never Used    Alcohol use: Not Currently      Comment: social    Drug use: No        REVIEW OF SYSTEMS:   GENERAL: a little tired and run down, no fever  SKIN: no rashes  EYES:no redness or discharge  OLVIN

## 2019-05-07 ENCOUNTER — TELEPHONE (OUTPATIENT)
Dept: FAMILY MEDICINE CLINIC | Facility: CLINIC | Age: 54
End: 2019-05-07

## 2019-05-07 RX ORDER — PREDNISONE 20 MG/1
TABLET ORAL
Qty: 18 TABLET | Refills: 0 | Status: SHIPPED | OUTPATIENT
Start: 2019-05-07 | End: 2019-05-16

## 2019-05-07 NOTE — TELEPHONE ENCOUNTER
Message   Received: Today   Message Contents   Louis Marseilles Darreld Lesch Nurse   Caller: Pt (Today, 12:59 PM)             Pt LM advising to send script for meds to [facility].       Script sent

## 2019-05-07 NOTE — TELEPHONE ENCOUNTER
PT WAS SEEN ON 5/4 WITH SINUS INFECTION.     PT ADV SHE HAS BEEN ON     Amoxicillin-Pot Clavulanate 875-125 MG Oral Tab    ADV THAT HER EARS ARE ALL PLUGGED UP AND NOT FEELING ANY BETTER    LOOKING FOR RECOMMENDATIONS    Shiva Mancilla [address] [place]

## 2019-05-07 NOTE — TELEPHONE ENCOUNTER
Left message on patients voice mail with the recommendation per . Asked patient to call back the office if she would like the prescription sent.

## 2019-05-21 RX ORDER — TRAMADOL HYDROCHLORIDE 50 MG/1
TABLET ORAL
Qty: 100 TABLET | Refills: 0 | Status: SHIPPED
Start: 2019-05-21 | End: 2019-06-18

## 2019-06-18 RX ORDER — TRAMADOL HYDROCHLORIDE 50 MG/1
TABLET ORAL
Qty: 100 TABLET | Refills: 1 | Status: SHIPPED | OUTPATIENT
Start: 2019-06-18 | End: 2019-07-09

## 2019-07-10 RX ORDER — TRAMADOL HYDROCHLORIDE 50 MG/1
TABLET ORAL
Qty: 100 TABLET | Refills: 0 | Status: SHIPPED
Start: 2019-07-10 | End: 2019-07-24

## 2019-07-10 NOTE — TELEPHONE ENCOUNTER
Last refilled on 6/18/19 for # 100 with 1 refills  Last OV 4/12/19   No future appointments. Thank you.

## 2019-07-25 RX ORDER — TRAMADOL HYDROCHLORIDE 50 MG/1
TABLET ORAL
Qty: 100 TABLET | Refills: 1 | Status: SHIPPED
Start: 2019-07-25 | End: 2019-08-26

## 2019-08-26 RX ORDER — TRAMADOL HYDROCHLORIDE 50 MG/1
TABLET ORAL
Qty: 100 TABLET | Refills: 1 | Status: SHIPPED
Start: 2019-08-26 | End: 2019-10-12

## 2019-08-26 NOTE — TELEPHONE ENCOUNTER
Last refilled on 7/25/19 for # 100 with 1 refills  Last OV 4/12/19  Future Appointments   Date Time Provider Tobin Friend   8/27/2019 11:15 AM Gael Manjarrez MD [facility]        Thank you.

## 2019-08-27 ENCOUNTER — OFFICE VISIT (OUTPATIENT)
Dept: FAMILY MEDICINE CLINIC | Facility: CLINIC | Age: 54
End: 2019-08-27
Payer: COMMERCIAL

## 2019-08-27 VITALS
SYSTOLIC BLOOD PRESSURE: 142 MMHG | HEART RATE: 88 BPM | RESPIRATION RATE: 22 BRPM | BODY MASS INDEX: 34 KG/M2 | TEMPERATURE: 99 F | DIASTOLIC BLOOD PRESSURE: 86 MMHG | WEIGHT: 210 LBS

## 2019-08-27 DIAGNOSIS — F98.8 ATTENTION DEFICIT DISORDER (ADD) WITHOUT HYPERACTIVITY: ICD-10-CM

## 2019-08-27 PROCEDURE — 99214 OFFICE O/P EST MOD 30 MIN: CPT | Performed by: FAMILY MEDICINE

## 2019-08-27 RX ORDER — DEXTROAMPHETAMINE SACCHARATE, AMPHETAMINE ASPARTATE, DEXTROAMPHETAMINE SULFATE AND AMPHETAMINE SULFATE 1.25; 1.25; 1.25; 1.25 MG/1; MG/1; MG/1; MG/1
5 TABLET ORAL DAILY
Qty: 30 TABLET | Refills: 0 | Status: SHIPPED | OUTPATIENT
Start: 2019-09-26 | End: 2019-10-26

## 2019-08-27 RX ORDER — DEXTROAMPHETAMINE SACCHARATE, AMPHETAMINE ASPARTATE MONOHYDRATE, DEXTROAMPHETAMINE SULFATE AND AMPHETAMINE SULFATE 7.5; 7.5; 7.5; 7.5 MG/1; MG/1; MG/1; MG/1
30 CAPSULE, EXTENDED RELEASE ORAL DAILY
Qty: 30 CAPSULE | Refills: 0 | Status: SHIPPED | OUTPATIENT
Start: 2019-08-27 | End: 2019-09-26

## 2019-08-27 RX ORDER — DEXTROAMPHETAMINE SACCHARATE, AMPHETAMINE ASPARTATE MONOHYDRATE, DEXTROAMPHETAMINE SULFATE AND AMPHETAMINE SULFATE 7.5; 7.5; 7.5; 7.5 MG/1; MG/1; MG/1; MG/1
30 CAPSULE, EXTENDED RELEASE ORAL DAILY
Qty: 30 CAPSULE | Refills: 0 | Status: SHIPPED | OUTPATIENT
Start: 2019-09-26 | End: 2019-10-26

## 2019-08-27 RX ORDER — DEXTROAMPHETAMINE SACCHARATE, AMPHETAMINE ASPARTATE MONOHYDRATE, DEXTROAMPHETAMINE SULFATE AND AMPHETAMINE SULFATE 7.5; 7.5; 7.5; 7.5 MG/1; MG/1; MG/1; MG/1
30 CAPSULE, EXTENDED RELEASE ORAL DAILY
Qty: 30 CAPSULE | Refills: 0 | Status: SHIPPED | OUTPATIENT
Start: 2019-10-25 | End: 2019-11-24

## 2019-08-27 RX ORDER — DEXTROAMPHETAMINE SACCHARATE, AMPHETAMINE ASPARTATE, DEXTROAMPHETAMINE SULFATE AND AMPHETAMINE SULFATE 1.25; 1.25; 1.25; 1.25 MG/1; MG/1; MG/1; MG/1
5 TABLET ORAL DAILY
Qty: 30 TABLET | Refills: 0 | Status: SHIPPED | OUTPATIENT
Start: 2019-08-27 | End: 2019-09-26

## 2019-08-27 RX ORDER — DEXTROAMPHETAMINE SACCHARATE, AMPHETAMINE ASPARTATE, DEXTROAMPHETAMINE SULFATE AND AMPHETAMINE SULFATE 1.25; 1.25; 1.25; 1.25 MG/1; MG/1; MG/1; MG/1
5 TABLET ORAL DAILY
Qty: 30 TABLET | Refills: 0 | Status: SHIPPED | OUTPATIENT
Start: 2019-10-25 | End: 2019-11-24

## 2019-08-27 NOTE — PROGRESS NOTES
Dayna Gore is a 62year old female. CC:  Patient presents with:  Medication Follow-Up: per pt, med follow up       HPI:  The patient is here for follow up medication use for ADD/ADHD.   Concentration: good  Organization abilities:good  Mood: stable Rfl:  2   CLINDAMYCIN PHOS-BENZOYL PEROX 1-5 % External Gel APPLY TWICE A DAY Disp: 100 g Rfl: 3   Albuterol Sulfate HFA (PROAIR HFA) 108 (90 Base) MCG/ACT Inhalation Aero Soln INHALE 2 PUFFS BY MOUTH EVERY 4 HOURS AS NEEDED FOR WHEEZING Disp: 3 Inhaler Rfl Not Currently      Comment: social    Drug use: No       ROS:  General: energy level stable  Cardiovascular/Pulses: Denies palpitations, tachycardia, irregular heart beat    Vitals: /86 (BP Location: Right arm, Patient Position: Sitting, Cuff Size: l Please fill after 58 days. • Amphetamine-Dextroamphet ER (ADDERALL XR) 30 MG Oral Capsule SR 24 Hr 30 capsule 0     Sig: Take 1 capsule (30 mg total) by mouth daily. Fill after 28 days.    • Amphetamine-Dextroamphet ER (ADDERALL XR) 30 MG Oral Capsule SR

## 2019-09-10 RX ORDER — ALBUTEROL SULFATE 90 UG/1
AEROSOL, METERED RESPIRATORY (INHALATION)
Qty: 25.5 G | Refills: 3 | Status: SHIPPED | OUTPATIENT
Start: 2019-09-10 | End: 2020-03-16

## 2019-10-07 RX ORDER — LEVOTHYROXINE SODIUM 0.2 MG/1
TABLET ORAL
Qty: 90 TABLET | Refills: 1 | Status: SHIPPED | OUTPATIENT
Start: 2019-10-07 | End: 2020-04-04

## 2019-10-07 NOTE — TELEPHONE ENCOUNTER
Last refilled on 1/7/19 for # 90 with 2 rf. Last labs 4/12/19. Last seen on 8/27/19. No future appointments. Thank you.

## 2019-10-14 RX ORDER — TRAMADOL HYDROCHLORIDE 50 MG/1
TABLET ORAL
Qty: 100 TABLET | Refills: 0 | Status: SHIPPED | OUTPATIENT
Start: 2019-10-14 | End: 2019-11-09

## 2019-10-20 RX ORDER — CITALOPRAM 20 MG/1
TABLET ORAL
Qty: 90 TABLET | Refills: 1 | Status: SHIPPED | OUTPATIENT
Start: 2019-10-20 | End: 2020-04-16

## 2019-11-11 RX ORDER — TRAMADOL HYDROCHLORIDE 50 MG/1
TABLET ORAL
Qty: 100 TABLET | Refills: 0 | Status: SHIPPED | OUTPATIENT
Start: 2019-11-11 | End: 2019-12-05

## 2019-11-22 RX ORDER — ALBUTEROL SULFATE 90 UG/1
AEROSOL, METERED RESPIRATORY (INHALATION)
Qty: 25.5 G | Refills: 0 | Status: SHIPPED | OUTPATIENT
Start: 2019-11-22 | End: 2019-12-06

## 2019-12-06 RX ORDER — TRAMADOL HYDROCHLORIDE 50 MG/1
TABLET ORAL
Qty: 100 TABLET | Refills: 0 | Status: SHIPPED | OUTPATIENT
Start: 2019-12-06 | End: 2019-12-20

## 2019-12-06 NOTE — TELEPHONE ENCOUNTER
Last refill: 11- #25.5g with 0 refills    Last Visit: 8-  Next Visit:   Future Appointments   Date Time Provider Tobin Friend   12/23/2019 10:00 AM [facility] [place]         Forward to Dr. Esther Tejeda please advise on refills.  Maegan Yanes

## 2019-12-10 ENCOUNTER — PATIENT MESSAGE (OUTPATIENT)
Dept: FAMILY MEDICINE CLINIC | Facility: CLINIC | Age: 54
End: 2019-12-10

## 2019-12-10 DIAGNOSIS — M25.552 LEFT HIP PAIN: ICD-10-CM

## 2019-12-10 DIAGNOSIS — Z13.220 LIPID SCREENING: ICD-10-CM

## 2019-12-10 DIAGNOSIS — F51.01 PRIMARY INSOMNIA: ICD-10-CM

## 2019-12-10 DIAGNOSIS — F98.8 ADD (ATTENTION DEFICIT DISORDER): ICD-10-CM

## 2019-12-10 DIAGNOSIS — E03.8 OTHER SPECIFIED HYPOTHYROIDISM: ICD-10-CM

## 2019-12-10 RX ORDER — DEXTROAMPHETAMINE SACCHARATE, AMPHETAMINE ASPARTATE, DEXTROAMPHETAMINE SULFATE AND AMPHETAMINE SULFATE 1.25; 1.25; 1.25; 1.25 MG/1; MG/1; MG/1; MG/1
5 TABLET ORAL DAILY
Qty: 30 TABLET | Refills: 0 | Status: SHIPPED | OUTPATIENT
Start: 2020-01-10 | End: 2020-02-09

## 2019-12-10 RX ORDER — DEXTROAMPHETAMINE SACCHARATE, AMPHETAMINE ASPARTATE MONOHYDRATE, DEXTROAMPHETAMINE SULFATE AND AMPHETAMINE SULFATE 7.5; 7.5; 7.5; 7.5 MG/1; MG/1; MG/1; MG/1
30 CAPSULE, EXTENDED RELEASE ORAL DAILY
Qty: 30 CAPSULE | Refills: 0 | Status: SHIPPED | OUTPATIENT
Start: 2020-02-07 | End: 2020-03-08

## 2019-12-10 RX ORDER — DEXTROAMPHETAMINE SACCHARATE, AMPHETAMINE ASPARTATE, DEXTROAMPHETAMINE SULFATE AND AMPHETAMINE SULFATE 1.25; 1.25; 1.25; 1.25 MG/1; MG/1; MG/1; MG/1
5 TABLET ORAL DAILY
Qty: 30 TABLET | Refills: 0 | Status: SHIPPED | OUTPATIENT
Start: 2020-02-10 | End: 2020-03-11

## 2019-12-10 RX ORDER — DEXTROAMPHETAMINE SACCHARATE, AMPHETAMINE ASPARTATE MONOHYDRATE, DEXTROAMPHETAMINE SULFATE AND AMPHETAMINE SULFATE 7.5; 7.5; 7.5; 7.5 MG/1; MG/1; MG/1; MG/1
30 CAPSULE, EXTENDED RELEASE ORAL DAILY
Qty: 30 CAPSULE | Refills: 0 | Status: SHIPPED | OUTPATIENT
Start: 2020-01-09 | End: 2020-02-08

## 2019-12-10 RX ORDER — DEXTROAMPHETAMINE SACCHARATE, AMPHETAMINE ASPARTATE MONOHYDRATE, DEXTROAMPHETAMINE SULFATE AND AMPHETAMINE SULFATE 7.5; 7.5; 7.5; 7.5 MG/1; MG/1; MG/1; MG/1
30 CAPSULE, EXTENDED RELEASE ORAL DAILY
Qty: 30 CAPSULE | Refills: 0 | Status: SHIPPED | OUTPATIENT
Start: 2019-12-10 | End: 2020-01-09

## 2019-12-10 RX ORDER — DEXTROAMPHETAMINE SACCHARATE, AMPHETAMINE ASPARTATE, DEXTROAMPHETAMINE SULFATE AND AMPHETAMINE SULFATE 1.25; 1.25; 1.25; 1.25 MG/1; MG/1; MG/1; MG/1
5 TABLET ORAL DAILY
Qty: 30 TABLET | Refills: 0 | Status: SHIPPED | OUTPATIENT
Start: 2019-12-10 | End: 2020-01-09

## 2019-12-10 NOTE — TELEPHONE ENCOUNTER
From: Yesika Hodges  To: Azalia Burris MD  Sent: 12/10/2019 9:14 AM CST  Subject: Prescription Question    Can I get scripts for Adderall 30 & 5.  Thanks

## 2019-12-20 RX ORDER — TRAMADOL HYDROCHLORIDE 50 MG/1
TABLET ORAL
Qty: 100 TABLET | Refills: 0 | Status: SHIPPED | OUTPATIENT
Start: 2019-12-20 | End: 2020-01-22

## 2019-12-23 ENCOUNTER — HOSPITAL ENCOUNTER (OUTPATIENT)
Dept: MAMMOGRAPHY | Age: 54
Discharge: HOME | End: 2019-12-23
Attending: PREVENTIVE MEDICINE

## 2019-12-23 ENCOUNTER — OFFICE VISIT (OUTPATIENT)
Dept: OTHER | Age: 54
End: 2019-12-23
Attending: FAMILY MEDICINE

## 2019-12-23 DIAGNOSIS — Z12.31 ENCOUNTER FOR SCREENING MAMMOGRAM FOR BREAST CANCER: ICD-10-CM

## 2019-12-23 DIAGNOSIS — Z00.00 ANNUAL PHYSICAL EXAM: Primary | ICD-10-CM

## 2020-01-22 RX ORDER — CLINDAMYCIN AND BENZOYL PEROXIDE 10; 50 MG/G; MG/G
GEL TOPICAL
Qty: 100 G | Refills: 7 | Status: SHIPPED | OUTPATIENT
Start: 2020-01-22 | End: 2021-07-06

## 2020-01-22 RX ORDER — TRAMADOL HYDROCHLORIDE 50 MG/1
TABLET ORAL
Qty: 100 TABLET | Refills: 0 | Status: SHIPPED | OUTPATIENT
Start: 2020-01-22 | End: 2020-02-14

## 2020-01-22 NOTE — TELEPHONE ENCOUNTER
Last refilled on 12/20/19 for # 100 with 0 rf. Last seen on 8/27/19. No future appointments. Thank you.

## 2020-01-22 NOTE — TELEPHONE ENCOUNTER
Last refilled on 10/14/18 for # 100 g with 3 rf. Last seen on 8/27/19. No future appointments. Thank you.

## 2020-02-14 RX ORDER — TRAMADOL HYDROCHLORIDE 50 MG/1
TABLET ORAL
Qty: 100 TABLET | Refills: 0 | Status: SHIPPED | OUTPATIENT
Start: 2020-02-14 | End: 2020-03-06

## 2020-02-14 NOTE — TELEPHONE ENCOUNTER
No refill protocol for this medication. Last refill: 1- #100 with 0 refills  Last Visit: 8-  Next Visit: No future appointments. Forward to Dr. Cory Shearer please advise on refills. Thanks.

## 2020-02-19 ENCOUNTER — TELEPHONE (OUTPATIENT)
Dept: FAMILY MEDICINE CLINIC | Facility: CLINIC | Age: 55
End: 2020-02-19

## 2020-02-19 NOTE — TELEPHONE ENCOUNTER
Left message on patients voice mail - asking if she requested the refill for the citalopram - 6 months [facility]. Spoke with Navi Cavanaugh at [facility] - she could not tell if patient requested the medication.

## 2020-03-06 RX ORDER — TRAMADOL HYDROCHLORIDE 50 MG/1
TABLET ORAL
Qty: 100 TABLET | Refills: 0 | Status: SHIPPED | OUTPATIENT
Start: 2020-03-06 | End: 2020-03-14

## 2020-03-06 NOTE — TELEPHONE ENCOUNTER
Last refill on Tramadol #100 with 0 refills on 2 14 2020   Last OV on 8 27 2019   No future appointments scheduled

## 2020-03-14 RX ORDER — TRAMADOL HYDROCHLORIDE 50 MG/1
TABLET ORAL
Qty: 100 TABLET | Refills: 0 | Status: SHIPPED | OUTPATIENT
Start: 2020-03-14 | End: 2020-05-04

## 2020-03-14 NOTE — TELEPHONE ENCOUNTER
No refill protocol for this medication. Last refill: 3/06/2020 #100 with 0 refills  Last Visit: 8/27/2019  Next Visit: No future appointments. Forward to Dr. Moises Lynn please advise on refills. Thanks.

## 2020-03-16 RX ORDER — ALBUTEROL SULFATE 90 UG/1
AEROSOL, METERED RESPIRATORY (INHALATION)
Qty: 25.5 G | Refills: 3 | Status: SHIPPED | OUTPATIENT
Start: 2020-03-16 | End: 2020-08-25

## 2020-04-04 ENCOUNTER — PATIENT MESSAGE (OUTPATIENT)
Dept: FAMILY MEDICINE CLINIC | Facility: CLINIC | Age: 55
End: 2020-04-04

## 2020-04-04 DIAGNOSIS — F98.8 ATTENTION DEFICIT DISORDER, UNSPECIFIED HYPERACTIVITY PRESENCE: ICD-10-CM

## 2020-04-04 DIAGNOSIS — F98.8 ADD (ATTENTION DEFICIT DISORDER): ICD-10-CM

## 2020-04-04 PROCEDURE — 99422 OL DIG E/M SVC 11-20 MIN: CPT | Performed by: FAMILY MEDICINE

## 2020-04-04 RX ORDER — LEVOTHYROXINE SODIUM 0.2 MG/1
TABLET ORAL
Qty: 90 TABLET | Refills: 3 | Status: SHIPPED | OUTPATIENT
Start: 2020-04-04 | End: 2021-03-30

## 2020-04-04 RX ORDER — DEXTROAMPHETAMINE SACCHARATE, AMPHETAMINE ASPARTATE MONOHYDRATE, DEXTROAMPHETAMINE SULFATE AND AMPHETAMINE SULFATE 7.5; 7.5; 7.5; 7.5 MG/1; MG/1; MG/1; MG/1
30 CAPSULE, EXTENDED RELEASE ORAL DAILY
Qty: 30 CAPSULE | Refills: 0 | Status: CANCELLED | OUTPATIENT
Start: 2020-04-04 | End: 2020-05-04

## 2020-04-04 RX ORDER — DEXTROAMPHETAMINE SACCHARATE, AMPHETAMINE ASPARTATE, DEXTROAMPHETAMINE SULFATE AND AMPHETAMINE SULFATE 1.25; 1.25; 1.25; 1.25 MG/1; MG/1; MG/1; MG/1
5 TABLET ORAL DAILY
Qty: 30 TABLET | Refills: 0 | Status: CANCELLED | OUTPATIENT
Start: 2020-04-04 | End: 2020-05-04

## 2020-04-04 NOTE — TELEPHONE ENCOUNTER
LOV: 8/27/19   Last Refill: Adderall 30 2/7/20  #30 0 RF  adderall 5mg 1/10/20 #30 0 RF    No future appointments.

## 2020-04-04 NOTE — TELEPHONE ENCOUNTER
From: Nani Hopkins  To: Sondra Posadas MD  Sent: 4/4/2020 6:50 AM CDT  Subject: Prescription Question    Hi, Can if I could get the Adderall ,30 and 5 prescription?    Thanks  Shoaib Prescott

## 2020-04-04 NOTE — TELEPHONE ENCOUNTER
Thyroid Supplements Protocol Passed4/3 11:45 PM   TSH test in past 12 months    TSH value between 0.350 and 5.500 IU/ml    Appointment in past 12 or next 3 months     LOV: 8/27/19  Last Refill: 10/7/19 #90 1 RF    No future appointments.   Filled per protoc

## 2020-04-06 RX ORDER — DEXTROAMPHETAMINE SACCHARATE, AMPHETAMINE ASPARTATE, DEXTROAMPHETAMINE SULFATE AND AMPHETAMINE SULFATE 1.25; 1.25; 1.25; 1.25 MG/1; MG/1; MG/1; MG/1
5 TABLET ORAL DAILY
Qty: 30 TABLET | Refills: 0 | Status: SHIPPED | OUTPATIENT
Start: 2020-06-07 | End: 2020-07-07

## 2020-04-06 RX ORDER — DEXTROAMPHETAMINE SACCHARATE, AMPHETAMINE ASPARTATE, DEXTROAMPHETAMINE SULFATE AND AMPHETAMINE SULFATE 1.25; 1.25; 1.25; 1.25 MG/1; MG/1; MG/1; MG/1
5 TABLET ORAL DAILY
Qty: 30 TABLET | Refills: 0 | Status: SHIPPED | OUTPATIENT
Start: 2020-04-06 | End: 2020-05-05

## 2020-04-06 RX ORDER — DEXTROAMPHETAMINE SACCHARATE, AMPHETAMINE ASPARTATE MONOHYDRATE, DEXTROAMPHETAMINE SULFATE AND AMPHETAMINE SULFATE 7.5; 7.5; 7.5; 7.5 MG/1; MG/1; MG/1; MG/1
30 CAPSULE, EXTENDED RELEASE ORAL DAILY
Qty: 30 CAPSULE | Refills: 0 | Status: SHIPPED | OUTPATIENT
Start: 2020-06-07 | End: 2020-07-07

## 2020-04-06 RX ORDER — DEXTROAMPHETAMINE SACCHARATE, AMPHETAMINE ASPARTATE MONOHYDRATE, DEXTROAMPHETAMINE SULFATE AND AMPHETAMINE SULFATE 7.5; 7.5; 7.5; 7.5 MG/1; MG/1; MG/1; MG/1
30 CAPSULE, EXTENDED RELEASE ORAL DAILY
Qty: 30 CAPSULE | Refills: 0 | Status: SHIPPED | OUTPATIENT
Start: 2020-04-06 | End: 2020-05-06

## 2020-04-06 RX ORDER — DEXTROAMPHETAMINE SACCHARATE, AMPHETAMINE ASPARTATE, DEXTROAMPHETAMINE SULFATE AND AMPHETAMINE SULFATE 1.25; 1.25; 1.25; 1.25 MG/1; MG/1; MG/1; MG/1
5 TABLET ORAL DAILY
Qty: 30 TABLET | Refills: 0 | Status: SHIPPED | OUTPATIENT
Start: 2020-05-07 | End: 2020-06-06

## 2020-04-06 RX ORDER — SULFACETAMIDE SODIUM, SULFUR 100; 50 MG/G; MG/G
LOTION TOPICAL
Qty: 60 G | Refills: 1 | Status: SHIPPED | OUTPATIENT
Start: 2020-04-06 | End: 2021-02-17

## 2020-04-06 RX ORDER — DEXTROAMPHETAMINE SACCHARATE, AMPHETAMINE ASPARTATE MONOHYDRATE, DEXTROAMPHETAMINE SULFATE AND AMPHETAMINE SULFATE 7.5; 7.5; 7.5; 7.5 MG/1; MG/1; MG/1; MG/1
30 CAPSULE, EXTENDED RELEASE ORAL DAILY
Qty: 30 CAPSULE | Refills: 0 | Status: SHIPPED | OUTPATIENT
Start: 2020-05-07 | End: 2020-06-06

## 2020-04-06 NOTE — TELEPHONE ENCOUNTER
Virtual/Telephone Check-In Due to [id number] verbally consents to a Virtual/Telephone Check-In service on 04/06/20.   Patient understands and accepts financial responsibility for any deductible, co-insurance and/or co-pays associated TAKE 1 TABLET DAILY 90 tablet 1   • Sulfacetamide Sodium-Sulfur 10-5 % External Lotion Apply to face nightly 60 g 5   • MedroxyPROGESTERone Acetate 2.5 MG Oral Tab Take 1 tablet (2.5 mg total) by mouth once daily.      • PREMARIN 0.625 MG Oral Tab Take 0.62 irregular heart beat

## 2020-04-14 ENCOUNTER — TELEPHONE (OUTPATIENT)
Dept: FAMILY MEDICINE CLINIC | Facility: CLINIC | Age: 55
End: 2020-04-14

## 2020-04-14 NOTE — TELEPHONE ENCOUNTER
Pharmacy faxed over refill request for Sodium Sulfacet/Sulf  90 day supply  To [facility]    LOV: 8/27/19   Last Refill: 4/6/20 60G with 1 Rf      Pharmacy has refill on file

## 2020-04-16 RX ORDER — CITALOPRAM 20 MG/1
TABLET ORAL
Qty: 90 TABLET | Refills: 1 | Status: SHIPPED | OUTPATIENT
Start: 2020-04-16 | End: 2020-09-30 | Stop reason: DRUGHIGH

## 2020-04-16 NOTE — TELEPHONE ENCOUNTER
Last refilled on 10/20/19 for # 90 with 1 refills  Last OV 8/27/19 for ADD  No future appointments. Thank you.

## 2020-05-04 RX ORDER — TRAMADOL HYDROCHLORIDE 50 MG/1
TABLET ORAL
Qty: 100 TABLET | Refills: 0 | Status: SHIPPED | OUTPATIENT
Start: 2020-05-04 | End: 2020-05-28

## 2020-05-18 RX ORDER — DEXTROAMPHETAMINE SACCHARATE, AMPHETAMINE ASPARTATE MONOHYDRATE, DEXTROAMPHETAMINE SULFATE AND AMPHETAMINE SULFATE 7.5; 7.5; 7.5; 7.5 MG/1; MG/1; MG/1; MG/1
30 CAPSULE, EXTENDED RELEASE ORAL DAILY
Qty: 30 CAPSULE | Refills: 0 | OUTPATIENT
Start: 2020-05-18 | End: 2020-06-17

## 2020-05-18 RX ORDER — DEXTROAMPHETAMINE SACCHARATE, AMPHETAMINE ASPARTATE, DEXTROAMPHETAMINE SULFATE AND AMPHETAMINE SULFATE 1.25; 1.25; 1.25; 1.25 MG/1; MG/1; MG/1; MG/1
5 TABLET ORAL DAILY
Qty: 30 TABLET | Refills: 0 | OUTPATIENT
Start: 2020-05-18 | End: 2020-06-17

## 2020-05-18 NOTE — TELEPHONE ENCOUNTER
Last refill - 5/7/20 - additional script sent for 6/7/20  [facility] pharmacy. Patient has a refill available. [employer] message sent to patient.

## 2020-05-28 RX ORDER — TRAMADOL HYDROCHLORIDE 50 MG/1
TABLET ORAL
Qty: 100 TABLET | Refills: 0 | Status: SHIPPED | OUTPATIENT
Start: 2020-05-28 | End: 2020-06-18

## 2020-05-28 NOTE — TELEPHONE ENCOUNTER
Refill request per protocol: none  Last refilled 5/4/20 #100 with 0 refills  LOV 8/27/19  No future appt  Routed to PCP to advise

## 2020-06-18 RX ORDER — TRAMADOL HYDROCHLORIDE 50 MG/1
TABLET ORAL
Qty: 100 TABLET | Refills: 0 | Status: SHIPPED | OUTPATIENT
Start: 2020-06-18 | End: 2020-07-14

## 2020-06-22 ENCOUNTER — PATIENT MESSAGE (OUTPATIENT)
Dept: FAMILY MEDICINE CLINIC | Facility: CLINIC | Age: 55
End: 2020-06-22

## 2020-06-22 RX ORDER — DEXTROAMPHETAMINE SACCHARATE, AMPHETAMINE ASPARTATE MONOHYDRATE, DEXTROAMPHETAMINE SULFATE AND AMPHETAMINE SULFATE 7.5; 7.5; 7.5; 7.5 MG/1; MG/1; MG/1; MG/1
30 CAPSULE, EXTENDED RELEASE ORAL DAILY
Qty: 30 CAPSULE | Refills: 0 | Status: SHIPPED | OUTPATIENT
Start: 2020-08-23 | End: 2020-09-22

## 2020-06-22 RX ORDER — DEXTROAMPHETAMINE SACCHARATE, AMPHETAMINE ASPARTATE MONOHYDRATE, DEXTROAMPHETAMINE SULFATE AND AMPHETAMINE SULFATE 7.5; 7.5; 7.5; 7.5 MG/1; MG/1; MG/1; MG/1
30 CAPSULE, EXTENDED RELEASE ORAL DAILY
Qty: 30 CAPSULE | Refills: 0 | Status: SHIPPED | OUTPATIENT
Start: 2020-06-22 | End: 2020-07-22

## 2020-06-22 RX ORDER — DEXTROAMPHETAMINE SACCHARATE, AMPHETAMINE ASPARTATE, DEXTROAMPHETAMINE SULFATE AND AMPHETAMINE SULFATE 1.25; 1.25; 1.25; 1.25 MG/1; MG/1; MG/1; MG/1
5 TABLET ORAL DAILY
Qty: 30 TABLET | Refills: 0 | Status: SHIPPED | OUTPATIENT
Start: 2020-07-23 | End: 2020-08-22

## 2020-06-22 RX ORDER — DEXTROAMPHETAMINE SACCHARATE, AMPHETAMINE ASPARTATE, DEXTROAMPHETAMINE SULFATE AND AMPHETAMINE SULFATE 1.25; 1.25; 1.25; 1.25 MG/1; MG/1; MG/1; MG/1
5 TABLET ORAL DAILY
Qty: 30 TABLET | Refills: 0 | Status: SHIPPED | OUTPATIENT
Start: 2020-06-22 | End: 2020-07-21

## 2020-06-22 RX ORDER — DEXTROAMPHETAMINE SACCHARATE, AMPHETAMINE ASPARTATE, DEXTROAMPHETAMINE SULFATE AND AMPHETAMINE SULFATE 1.25; 1.25; 1.25; 1.25 MG/1; MG/1; MG/1; MG/1
5 TABLET ORAL DAILY
Qty: 30 TABLET | Refills: 0 | Status: SHIPPED | OUTPATIENT
Start: 2020-08-23 | End: 2020-09-22

## 2020-06-22 RX ORDER — DEXTROAMPHETAMINE SACCHARATE, AMPHETAMINE ASPARTATE MONOHYDRATE, DEXTROAMPHETAMINE SULFATE AND AMPHETAMINE SULFATE 7.5; 7.5; 7.5; 7.5 MG/1; MG/1; MG/1; MG/1
30 CAPSULE, EXTENDED RELEASE ORAL DAILY
Qty: 30 CAPSULE | Refills: 0 | Status: SHIPPED | OUTPATIENT
Start: 2020-07-23 | End: 2020-08-22

## 2020-06-22 NOTE — TELEPHONE ENCOUNTER
From: Dayna Gore  To: Magdaleno Gloria MD  Sent: 6/22/2020 11:27 AM CDT  Subject: Other    Good Morning! I need a refill on Adderall 30 mg and 5mg. Thank You.  J

## 2020-07-14 RX ORDER — TRAMADOL HYDROCHLORIDE 50 MG/1
TABLET ORAL
Qty: 100 TABLET | Refills: 0 | Status: SHIPPED | OUTPATIENT
Start: 2020-07-14 | End: 2020-08-11

## 2020-07-26 ENCOUNTER — PATIENT MESSAGE (OUTPATIENT)
Dept: FAMILY MEDICINE CLINIC | Facility: CLINIC | Age: 55
End: 2020-07-26

## 2020-07-27 NOTE — TELEPHONE ENCOUNTER
From: Dayna Gore  To: Magdaleno Gloria MD  Sent: 7/26/2020 12:02 PM CDT  Subject: Prescription Question    Hi, I need a Refill on the Adderal 30 and 5 m.   Thanks [facility]

## 2020-07-27 NOTE — TELEPHONE ENCOUNTER
Looks like pt has scripts on file at pharmacy that start on 7/23    Mychart sent to the patient to contact pharmacy

## 2020-08-11 RX ORDER — TRAMADOL HYDROCHLORIDE 50 MG/1
TABLET ORAL
Qty: 100 TABLET | Refills: 0 | Status: SHIPPED | OUTPATIENT
Start: 2020-08-11 | End: 2020-09-03

## 2020-08-25 RX ORDER — ALBUTEROL SULFATE 90 UG/1
AEROSOL, METERED RESPIRATORY (INHALATION)
Qty: 25.5 G | Refills: 3 | Status: SHIPPED | OUTPATIENT
Start: 2020-08-25 | End: 2021-05-31

## 2020-08-25 NOTE — TELEPHONE ENCOUNTER
Received fax from [facility] for refill request for albuterol. Medication Quantity Refills Start End   Albuterol Sulfate HFA (PROAIR HFA) 108 (90 Base) MCG/ACT Inhalation Aero Soln 25.5 g 3 3/16/2020      Last OV 8/27/19 for ADD  No future appointments.

## 2020-09-03 RX ORDER — TRAMADOL HYDROCHLORIDE 50 MG/1
TABLET ORAL
Qty: 100 TABLET | Refills: 0 | Status: SHIPPED | OUTPATIENT
Start: 2020-09-03 | End: 2020-09-30

## 2020-09-03 NOTE — TELEPHONE ENCOUNTER
Please let patient or caregiver know or leave message that refill request for Ultram has come from the pharmacy. It has  been refilled.   Per prescribing guidelines, I need to show continuity and oversight of care for all prescriptions especially with contr

## 2020-09-28 ENCOUNTER — PATIENT MESSAGE (OUTPATIENT)
Dept: FAMILY MEDICINE CLINIC | Facility: CLINIC | Age: 55
End: 2020-09-28

## 2020-09-28 RX ORDER — DEXTROAMPHETAMINE SACCHARATE, AMPHETAMINE ASPARTATE MONOHYDRATE, DEXTROAMPHETAMINE SULFATE AND AMPHETAMINE SULFATE 7.5; 7.5; 7.5; 7.5 MG/1; MG/1; MG/1; MG/1
30 CAPSULE, EXTENDED RELEASE ORAL DAILY
Qty: 30 CAPSULE | Refills: 0 | Status: CANCELLED | OUTPATIENT
Start: 2020-09-28 | End: 2020-10-28

## 2020-09-28 RX ORDER — DEXTROAMPHETAMINE SACCHARATE, AMPHETAMINE ASPARTATE, DEXTROAMPHETAMINE SULFATE AND AMPHETAMINE SULFATE 1.25; 1.25; 1.25; 1.25 MG/1; MG/1; MG/1; MG/1
5 TABLET ORAL DAILY
Qty: 30 TABLET | Refills: 0 | Status: CANCELLED | OUTPATIENT
Start: 2020-09-28 | End: 2020-10-27

## 2020-09-28 NOTE — TELEPHONE ENCOUNTER
From: Joanne Hamm  To: Christine Arce MD  Sent: 9/28/2020 12:46 PM CDT  Subject: Prescription Question    Hi, I need a Refill for the Adderll called into [facility] in [place]. ...30 & 5 mgs.    Thanks  Nidia Wiley

## 2020-09-29 PROCEDURE — 80061 LIPID PANEL: CPT | Performed by: FAMILY MEDICINE

## 2020-09-29 PROCEDURE — 36415 COLL VENOUS BLD VENIPUNCTURE: CPT | Performed by: FAMILY MEDICINE

## 2020-09-29 PROCEDURE — 80048 BASIC METABOLIC PNL TOTAL CA: CPT | Performed by: FAMILY MEDICINE

## 2020-09-29 PROCEDURE — 84481 FREE ASSAY (FT-3): CPT | Performed by: FAMILY MEDICINE

## 2020-09-29 PROCEDURE — 84443 ASSAY THYROID STIM HORMONE: CPT | Performed by: FAMILY MEDICINE

## 2020-09-29 PROCEDURE — 84439 ASSAY OF FREE THYROXINE: CPT | Performed by: FAMILY MEDICINE

## 2020-09-29 NOTE — PROGRESS NOTES
Josue Montana is a 60year old female. CC:  Patient presents with:  Medication Follow-Up      HPI:  The patient is here for follow up medication use for ADD/ADHD. She has taken the medication for many years. No alleviating/aggravating factors.   Shireen disease    • Visual impairment     reading glasses      Past Surgical History:   Procedure Laterality Date   •      •       X2   • COLONOSCOPY, POSSIBLE BIOPSY, POSSIBLE POLYPECTOMY 95706 N/A 2017    Performed by DO holly Harris accessory muscle use  GI: not examined  PSYCH: alert and oriented x 3; affect appropriate  SKIN: not examined  BREAST: not examined/not applicable  EXTREMITIES: No clubbing, cyanosis or edema  RECTAL: not examined  GENITAL: not examined  LYMPH: no supracla MG Oral Tab 90 tablet 1     Sig: Take 1 tablet (40 mg total) by mouth daily. • Amphetamine-Dextroamphet ER (ADDERALL XR) 30 MG Oral Capsule SR 24 Hr 30 capsule 0     Sig: Take 1 capsule (30 mg total) by mouth daily.    • Amphetamine-Dextroamphet ER (ADDER

## 2020-09-30 ENCOUNTER — TELEPHONE (OUTPATIENT)
Dept: FAMILY MEDICINE CLINIC | Facility: CLINIC | Age: 55
End: 2020-09-30

## 2020-09-30 RX ORDER — TRAMADOL HYDROCHLORIDE 50 MG/1
50 TABLET ORAL EVERY 6 HOURS PRN
Qty: 100 TABLET | Refills: 0 | Status: SHIPPED | OUTPATIENT
Start: 2020-09-30 | End: 2020-10-26

## 2020-09-30 RX ORDER — LEVOTHYROXINE SODIUM 0.03 MG/1
25 TABLET ORAL
Qty: 90 TABLET | Refills: 0 | Status: SHIPPED | OUTPATIENT
Start: 2020-09-30 | End: 2020-12-22

## 2020-10-14 RX ORDER — CITALOPRAM 20 MG/1
TABLET ORAL
Qty: 90 TABLET | Refills: 3 | OUTPATIENT
Start: 2020-10-14

## 2020-10-26 RX ORDER — TRAMADOL HYDROCHLORIDE 50 MG/1
TABLET ORAL
Qty: 100 TABLET | Refills: 0 | Status: SHIPPED | OUTPATIENT
Start: 2020-10-26 | End: 2020-11-30

## 2020-10-26 RX ORDER — TRAMADOL HYDROCHLORIDE 50 MG/1
50 TABLET ORAL EVERY 6 HOURS PRN
Qty: 100 TABLET | Refills: 0 | OUTPATIENT
Start: 2020-10-26

## 2020-11-28 NOTE — TELEPHONE ENCOUNTER
Protocol: none  Last refilled 10/26/20 #100 with 0 RF  LOV with TJ 9/29/20  No future appt  Routed to PCP to advise refill

## 2020-11-30 RX ORDER — TRAMADOL HYDROCHLORIDE 50 MG/1
TABLET ORAL
Qty: 100 TABLET | Refills: 0 | Status: SHIPPED | OUTPATIENT
Start: 2020-11-30 | End: 2020-12-28

## 2020-12-15 RX ORDER — DEXTROAMPHETAMINE SACCHARATE, AMPHETAMINE ASPARTATE MONOHYDRATE, DEXTROAMPHETAMINE SULFATE AND AMPHETAMINE SULFATE 7.5; 7.5; 7.5; 7.5 MG/1; MG/1; MG/1; MG/1
30 CAPSULE, EXTENDED RELEASE ORAL DAILY
Qty: 30 CAPSULE | Refills: 0 | Status: SHIPPED | OUTPATIENT
Start: 2020-12-15 | End: 2021-01-14

## 2020-12-15 RX ORDER — DEXTROAMPHETAMINE SACCHARATE, AMPHETAMINE ASPARTATE, DEXTROAMPHETAMINE SULFATE AND AMPHETAMINE SULFATE 1.25; 1.25; 1.25; 1.25 MG/1; MG/1; MG/1; MG/1
5 TABLET ORAL DAILY
Qty: 30 TABLET | Refills: 0 | Status: CANCELLED | OUTPATIENT
Start: 2020-12-15 | End: 2021-01-14

## 2020-12-15 RX ORDER — DEXTROAMPHETAMINE SACCHARATE, AMPHETAMINE ASPARTATE, DEXTROAMPHETAMINE SULFATE AND AMPHETAMINE SULFATE 1.25; 1.25; 1.25; 1.25 MG/1; MG/1; MG/1; MG/1
5 TABLET ORAL DAILY
Qty: 30 TABLET | Refills: 0 | Status: SHIPPED | OUTPATIENT
Start: 2021-01-15 | End: 2021-02-14

## 2020-12-15 RX ORDER — DEXTROAMPHETAMINE SACCHARATE, AMPHETAMINE ASPARTATE MONOHYDRATE, DEXTROAMPHETAMINE SULFATE AND AMPHETAMINE SULFATE 7.5; 7.5; 7.5; 7.5 MG/1; MG/1; MG/1; MG/1
30 CAPSULE, EXTENDED RELEASE ORAL DAILY
Qty: 30 CAPSULE | Refills: 0 | Status: SHIPPED | OUTPATIENT
Start: 2021-02-15 | End: 2021-03-03

## 2020-12-15 RX ORDER — DEXTROAMPHETAMINE SACCHARATE, AMPHETAMINE ASPARTATE, DEXTROAMPHETAMINE SULFATE AND AMPHETAMINE SULFATE 1.25; 1.25; 1.25; 1.25 MG/1; MG/1; MG/1; MG/1
5 TABLET ORAL DAILY
Qty: 30 TABLET | Refills: 0 | Status: SHIPPED | OUTPATIENT
Start: 2021-02-15 | End: 2021-03-17

## 2020-12-15 RX ORDER — DEXTROAMPHETAMINE SACCHARATE, AMPHETAMINE ASPARTATE, DEXTROAMPHETAMINE SULFATE AND AMPHETAMINE SULFATE 1.25; 1.25; 1.25; 1.25 MG/1; MG/1; MG/1; MG/1
5 TABLET ORAL DAILY
Qty: 30 TABLET | Refills: 0 | Status: SHIPPED | OUTPATIENT
Start: 2020-12-15 | End: 2021-01-14

## 2020-12-15 RX ORDER — DEXTROAMPHETAMINE SACCHARATE, AMPHETAMINE ASPARTATE MONOHYDRATE, DEXTROAMPHETAMINE SULFATE AND AMPHETAMINE SULFATE 7.5; 7.5; 7.5; 7.5 MG/1; MG/1; MG/1; MG/1
30 CAPSULE, EXTENDED RELEASE ORAL DAILY
Qty: 30 CAPSULE | Refills: 0 | Status: SHIPPED | OUTPATIENT
Start: 2021-01-15 | End: 2021-02-14

## 2020-12-15 RX ORDER — DEXTROAMPHETAMINE SACCHARATE, AMPHETAMINE ASPARTATE MONOHYDRATE, DEXTROAMPHETAMINE SULFATE AND AMPHETAMINE SULFATE 7.5; 7.5; 7.5; 7.5 MG/1; MG/1; MG/1; MG/1
30 CAPSULE, EXTENDED RELEASE ORAL DAILY
Qty: 30 CAPSULE | Refills: 0 | Status: CANCELLED | OUTPATIENT
Start: 2020-12-15 | End: 2021-01-14

## 2020-12-15 NOTE — TELEPHONE ENCOUNTER
Routing to provider per protocol. Both strengths of Adderall last refilled on 11/30/20 for # 30 with 0 rf. Last seen on 9/29/20.      Future Appointments   Date Time Provider Tobin Friend   12/29/2020  9:40 AM [facility]

## 2020-12-22 RX ORDER — LEVOTHYROXINE SODIUM 0.03 MG/1
TABLET ORAL
Qty: 90 TABLET | Refills: 0 | Status: SHIPPED | OUTPATIENT
Start: 2020-12-22 | End: 2021-03-29

## 2020-12-22 NOTE — TELEPHONE ENCOUNTER
Please let patient or caregiver know or leave message that refill request for thyroid medication has come from the pharmacy. It has been refilled. She is due to see me to f/u the higher thyroid medication dose.  Please make her an appointment in the next w

## 2020-12-28 RX ORDER — TRAMADOL HYDROCHLORIDE 50 MG/1
50 TABLET ORAL EVERY 6 HOURS PRN
Qty: 100 TABLET | Refills: 0 | Status: SHIPPED | OUTPATIENT
Start: 2020-12-28 | End: 2021-01-18

## 2020-12-29 ENCOUNTER — HOSPITAL ENCOUNTER (OUTPATIENT)
Dept: MAMMOGRAPHY | Facility: HOSPITAL | Age: 55
Discharge: HOME | End: 2020-12-29
Attending: PREVENTIVE MEDICINE

## 2020-12-29 ENCOUNTER — OFFICE VISIT (OUTPATIENT)
Dept: OTHER | Facility: HOSPITAL | Age: 55
End: 2020-12-29
Attending: PREVENTIVE MEDICINE

## 2020-12-29 DIAGNOSIS — Z00.00 ANNUAL PHYSICAL EXAM: Primary | ICD-10-CM

## 2020-12-29 DIAGNOSIS — Z12.31 ENCOUNTER FOR SCREENING MAMMOGRAM FOR BREAST CANCER: ICD-10-CM

## 2020-12-29 PROCEDURE — 93005 ELECTROCARDIOGRAM TRACING: CPT

## 2021-01-18 RX ORDER — TRAMADOL HYDROCHLORIDE 50 MG/1
TABLET ORAL
Qty: 100 TABLET | Refills: 0 | Status: SHIPPED | OUTPATIENT
Start: 2021-01-18 | End: 2021-02-17

## 2021-02-17 RX ORDER — SULFACETAMIDE SODIUM, SULFUR 100; 50 MG/G; MG/G
LOTION TOPICAL
Qty: 50 G | Refills: 0 | OUTPATIENT
Start: 2021-02-17

## 2021-02-17 RX ORDER — TRAMADOL HYDROCHLORIDE 50 MG/1
TABLET ORAL
Qty: 100 TABLET | Refills: 0 | Status: SHIPPED | OUTPATIENT
Start: 2021-02-17 | End: 2021-03-23

## 2021-02-17 RX ORDER — SULFACETAMIDE SODIUM, SULFUR 100; 50 MG/G; MG/G
LOTION TOPICAL
Qty: 60 G | Refills: 5 | Status: SHIPPED | OUTPATIENT
Start: 2021-02-17 | End: 2021-12-08

## 2021-02-17 NOTE — TELEPHONE ENCOUNTER
Last OV 9/29/2020  Tramadol last refilled 1/18/21  #100  0 refills    Also requesting Sulfacetamide to mail order Sodium-Sulfur 10-5 % External Lotion (see other refill enc 2/17/21)  Last refilled 4/6/2020 60 g  1 refill    Tramadol pended to [facility]

## 2021-03-04 RX ORDER — DEXTROAMPHETAMINE SACCHARATE, AMPHETAMINE ASPARTATE MONOHYDRATE, DEXTROAMPHETAMINE SULFATE AND AMPHETAMINE SULFATE 7.5; 7.5; 7.5; 7.5 MG/1; MG/1; MG/1; MG/1
30 CAPSULE, EXTENDED RELEASE ORAL DAILY
Qty: 30 CAPSULE | Refills: 0 | Status: SHIPPED | OUTPATIENT
Start: 2021-03-04 | End: 2021-04-03

## 2021-03-04 NOTE — TELEPHONE ENCOUNTER
Please let patient or caregiver know or leave message that her Adderall XR was refilled per her request.  She will be due for a 6 month f/u on this medication at the end of this refill.  Thanks
[facility] sent advising refill and med f/u
Detail Level: Zone

## 2021-03-08 ENCOUNTER — PATIENT MESSAGE (OUTPATIENT)
Dept: FAMILY MEDICINE CLINIC | Facility: CLINIC | Age: 56
End: 2021-03-08

## 2021-03-08 NOTE — TELEPHONE ENCOUNTER
From: Kina Mcgovern  To: Celestina Smiley MD  Sent: 3/8/2021 11:41 AM CST  Subject: Other    Hi Dr Cornelio Dean,  I'm actually asking about the COVID Vaccine for my  Erasto Valdez.  I work at a [employer] and will be getting my second shot on March 16th

## 2021-03-15 RX ORDER — CITALOPRAM 40 MG/1
TABLET ORAL
Qty: 90 TABLET | Refills: 1 | Status: SHIPPED | OUTPATIENT
Start: 2021-03-15 | End: 2021-06-12

## 2021-03-23 RX ORDER — TRAMADOL HYDROCHLORIDE 50 MG/1
TABLET ORAL
Qty: 100 TABLET | Refills: 0 | Status: SHIPPED | OUTPATIENT
Start: 2021-03-23 | End: 2021-04-24

## 2021-03-29 RX ORDER — LEVOTHYROXINE SODIUM 0.03 MG/1
TABLET ORAL
Qty: 90 TABLET | Refills: 0 | Status: SHIPPED | OUTPATIENT
Start: 2021-03-29 | End: 2021-06-16

## 2021-03-29 NOTE — TELEPHONE ENCOUNTER
Thyroid Supplements Protocol [id number]/29/2021 11:42 AM   TSH test in past 12 months Protocol Details    TSH value between 0.350 and 5.500 IU/ml     Appointment in past 12 or next 3 months          LOV 9/29/2020  Last labs 3/29/2020  Last refill 12/22/2020

## 2021-03-30 RX ORDER — LEVOTHYROXINE SODIUM 0.2 MG/1
TABLET ORAL
Qty: 90 TABLET | Refills: 0 | Status: SHIPPED | OUTPATIENT
Start: 2021-03-30 | End: 2021-06-07

## 2021-03-30 NOTE — TELEPHONE ENCOUNTER
Please let patient or caregiver know or leave message that refill request for thyroid med has/have come from the pharmacy. The refills have been sent. She is a bit overdue to f/u the higher dose of thyroid medication.  Please have her see me in the next fe

## 2021-04-12 ENCOUNTER — PATIENT MESSAGE (OUTPATIENT)
Dept: FAMILY MEDICINE CLINIC | Facility: CLINIC | Age: 56
End: 2021-04-12

## 2021-04-12 RX ORDER — DEXTROAMPHETAMINE SACCHARATE, AMPHETAMINE ASPARTATE, DEXTROAMPHETAMINE SULFATE AND AMPHETAMINE SULFATE 1.25; 1.25; 1.25; 1.25 MG/1; MG/1; MG/1; MG/1
5 TABLET ORAL DAILY
Qty: 30 TABLET | Refills: 0 | Status: SHIPPED | OUTPATIENT
Start: 2021-04-12 | End: 2021-07-06

## 2021-04-12 RX ORDER — DEXTROAMPHETAMINE SACCHARATE, AMPHETAMINE ASPARTATE MONOHYDRATE, DEXTROAMPHETAMINE SULFATE AND AMPHETAMINE SULFATE 7.5; 7.5; 7.5; 7.5 MG/1; MG/1; MG/1; MG/1
30 CAPSULE, EXTENDED RELEASE ORAL EVERY MORNING
Qty: 30 CAPSULE | Refills: 0 | Status: SHIPPED | OUTPATIENT
Start: 2021-04-12 | End: 2021-07-06

## 2021-04-12 NOTE — TELEPHONE ENCOUNTER
----- Message from Christopher Allison sent at 4/12/2021 11:02 AM CDT -----  Regarding: Prescription Question  Contact: [phone]  Hi, I think I need a new script for the Adderal tabs (30 & 5) sent to the [facility] in [place].   Thanks [employer]

## 2021-04-12 NOTE — TELEPHONE ENCOUNTER
LOV 9/29/2020  Last labs 9/29/2020  Last refill on 3/4/2021, for #30 caps, with 0 refills    Amphetamine-Dextroamphet ER (ADDERALL XR) 30 MG Oral Capsule SR 24 Hr    No future appointments.

## 2021-04-24 RX ORDER — TRAMADOL HYDROCHLORIDE 50 MG/1
TABLET ORAL
Qty: 100 TABLET | Refills: 0 | Status: SHIPPED | OUTPATIENT
Start: 2021-04-24 | End: 2021-05-24

## 2021-05-18 RX ORDER — DEXTROAMPHETAMINE SACCHARATE, AMPHETAMINE ASPARTATE MONOHYDRATE, DEXTROAMPHETAMINE SULFATE AND AMPHETAMINE SULFATE 7.5; 7.5; 7.5; 7.5 MG/1; MG/1; MG/1; MG/1
30 CAPSULE, EXTENDED RELEASE ORAL EVERY MORNING
Qty: 30 CAPSULE | Refills: 0 | OUTPATIENT
Start: 2021-05-18

## 2021-05-18 RX ORDER — DEXTROAMPHETAMINE SACCHARATE, AMPHETAMINE ASPARTATE, DEXTROAMPHETAMINE SULFATE AND AMPHETAMINE SULFATE 1.25; 1.25; 1.25; 1.25 MG/1; MG/1; MG/1; MG/1
5 TABLET ORAL DAILY
Qty: 30 TABLET | Refills: 0 | OUTPATIENT
Start: 2021-05-18

## 2021-05-18 NOTE — TELEPHONE ENCOUNTER
Protocol: none  Both last refilled 4/12/21 for #30  LOV with TJ 9/29/20 for ADD  No future appt  Routed to PCP to advise

## 2021-05-19 NOTE — PROGRESS NOTES
My Chart/ Video/Telephone Visit Check-In Due to [id number] verbally consents a video Check-In service on 05/19/21.   Patient understands and accepts financial responsibility for any deductible, co-insurance and/or co-pays associated CLINDAMYCIN PHOS-BENZOYL PEROX 1-5 % External Gel APPLY TWICE A  g 7   • MedroxyPROGESTERone Acetate 2.5 MG Oral Tab Take 1 tablet (2.5 mg total) by mouth once daily. • PREMARIN 0.625 MG Oral Tab Take 0.625 mg by mouth once daily.   3        Hist present medications   F/u in 6 months    2. Depression, unspecified depression type  Deteriorated  Add Wellbutrin  mg every day  Continue Celexa for now.  If mood improves on Wellbutrin then we will wean the Celexa  F/u in 6 weeks      Orders for this medications, radiology tests and decision making.   Appropriate medical decision-making and tests are ordered as detailed in the plan of care above.”

## 2021-05-22 NOTE — TELEPHONE ENCOUNTER
Routing to provider per protocol. Last refilled on 4/24/21 for # 100 with 0 rf. Last seen on 9/29/20. No future appointments. Thank you.

## 2021-05-24 RX ORDER — TRAMADOL HYDROCHLORIDE 50 MG/1
TABLET ORAL
Qty: 100 TABLET | Refills: 0 | Status: SHIPPED | OUTPATIENT
Start: 2021-05-24 | End: 2021-06-21

## 2021-05-31 RX ORDER — ALBUTEROL SULFATE 90 UG/1
AEROSOL, METERED RESPIRATORY (INHALATION)
Qty: 25.5 G | Refills: 3 | Status: SHIPPED | OUTPATIENT
Start: 2021-05-31 | End: 2021-09-08

## 2021-06-07 RX ORDER — LEVOTHYROXINE SODIUM 0.2 MG/1
TABLET ORAL
Qty: 90 TABLET | Refills: 2 | Status: SHIPPED | OUTPATIENT
Start: 2021-06-07

## 2021-06-14 RX ORDER — CITALOPRAM 40 MG/1
40 TABLET ORAL DAILY
Qty: 90 TABLET | Refills: 1 | Status: SHIPPED | OUTPATIENT
Start: 2021-06-14 | End: 2021-06-16

## 2021-06-14 NOTE — TELEPHONE ENCOUNTER
Please let patient or caregiver know or leave message that albuterol was refilled. Did she really use 4 albuterol inhalers since 9/19? If so we need to see her and discuss why her lung function is down.   Thanks Spoke with Elza at infusion therapy who states they need actual blood therapy orders for pt in system as well as consent form faxed [phone]. Infusion will be faxing over consent form and need pcp to put infusion orders in epic.

## 2021-06-16 RX ORDER — CITALOPRAM 40 MG/1
40 TABLET ORAL DAILY
Qty: 90 TABLET | Refills: 2 | Status: SHIPPED | OUTPATIENT
Start: 2021-06-16 | End: 2021-06-22

## 2021-06-16 RX ORDER — LEVOTHYROXINE SODIUM 0.03 MG/1
25 TABLET ORAL
Qty: 90 TABLET | Refills: 1 | Status: SHIPPED | OUTPATIENT
Start: 2021-06-16 | End: 2021-06-22

## 2021-06-21 RX ORDER — TRAMADOL HYDROCHLORIDE 50 MG/1
TABLET ORAL
Qty: 100 TABLET | Refills: 0 | Status: SHIPPED | OUTPATIENT
Start: 2021-06-21 | End: 2021-07-22

## 2021-06-21 NOTE — TELEPHONE ENCOUNTER
Routing to provider per protocol. TRAMADOL HCL 50 MG Oral Tab    Last refilled on 5/24/21 for #100  with 0 rf. Last labs 9/29/20. Last seen on 5/19/21.      Future Appointments   Date Time Provider Tobin Friend   7/6/2021  9:00 AM [facility]

## 2021-06-22 RX ORDER — LEVOTHYROXINE SODIUM 0.03 MG/1
25 TABLET ORAL
Qty: 90 TABLET | Refills: 1 | Status: SHIPPED | OUTPATIENT
Start: 2021-06-22 | End: 2021-12-06

## 2021-06-22 RX ORDER — CITALOPRAM 40 MG/1
40 TABLET ORAL DAILY
Qty: 90 TABLET | Refills: 1 | Status: SHIPPED | OUTPATIENT
Start: 2021-06-22

## 2021-07-06 ENCOUNTER — HOSPITAL ENCOUNTER (OUTPATIENT)
Dept: GENERAL RADIOLOGY | Facility: HOSPITAL | Age: 56
Discharge: HOME | End: 2021-07-06
Attending: ORTHOPAEDIC SURGERY
Payer: COMMERCIAL

## 2021-07-06 ENCOUNTER — OFFICE VISIT (OUTPATIENT)
Dept: ORTHOPEDICS CLINIC | Facility: CLINIC | Age: 56
End: 2021-07-06
Payer: COMMERCIAL

## 2021-07-06 DIAGNOSIS — M25.551 RIGHT HIP PAIN: Primary | ICD-10-CM

## 2021-07-06 DIAGNOSIS — M16.11 PRIMARY OSTEOARTHRITIS OF RIGHT HIP: ICD-10-CM

## 2021-07-06 DIAGNOSIS — Z96.642 HISTORY OF TOTAL HIP ARTHROPLASTY, LEFT: ICD-10-CM

## 2021-07-06 DIAGNOSIS — M25.551 RIGHT HIP PAIN: ICD-10-CM

## 2021-07-06 PROCEDURE — 73502 X-RAY EXAM HIP UNI 2-3 VIEWS: CPT | Performed by: ORTHOPAEDIC SURGERY

## 2021-07-06 PROCEDURE — 99212 OFFICE O/P EST SF 10 MIN: CPT | Performed by: ORTHOPAEDIC SURGERY

## 2021-07-06 NOTE — PROGRESS NOTES
Patient is a 57-year-old white female here for evaluation of her right hip. I have seen her for problems with the left hip and she had had a total hip arthroplasty performed several years ago. That hip is doing well. No complaints.   Patient states that

## 2021-07-22 RX ORDER — TRAMADOL HYDROCHLORIDE 50 MG/1
TABLET ORAL
Qty: 100 TABLET | Refills: 0 | Status: SHIPPED | OUTPATIENT
Start: 2021-07-22 | End: 2021-08-17

## 2021-07-22 RX ORDER — BUPROPION HYDROCHLORIDE 150 MG/1
TABLET ORAL
Qty: 90 TABLET | Refills: 1 | Status: SHIPPED | OUTPATIENT
Start: 2021-07-22 | End: 2021-12-29

## 2021-08-17 RX ORDER — TRAMADOL HYDROCHLORIDE 50 MG/1
TABLET ORAL
Qty: 100 TABLET | Refills: 0 | Status: SHIPPED | OUTPATIENT
Start: 2021-08-17 | End: 2021-09-10

## 2021-08-17 NOTE — TELEPHONE ENCOUNTER
Protocol: none  Last refilled 7/22/21 for #100 with 0 RF  LV with TJ- telemed 5/19/21  No future appt  Routed to PCP to advise

## 2021-08-30 ENCOUNTER — PATIENT MESSAGE (OUTPATIENT)
Dept: FAMILY MEDICINE CLINIC | Facility: CLINIC | Age: 56
End: 2021-08-30

## 2021-09-08 ENCOUNTER — PATIENT MESSAGE (OUTPATIENT)
Dept: FAMILY MEDICINE CLINIC | Facility: CLINIC | Age: 56
End: 2021-09-08

## 2021-09-09 RX ORDER — ALBUTEROL SULFATE 90 UG/1
2 AEROSOL, METERED RESPIRATORY (INHALATION) EVERY 4 HOURS PRN
Qty: 25.5 G | Refills: 3 | Status: SHIPPED | OUTPATIENT
Start: 2021-09-09

## 2021-09-09 NOTE — TELEPHONE ENCOUNTER
From: Shahzad Overall  To:  William Hernandez MD  Sent: 9/8/2021 8:25 PM CDT  Subject: Prescription Question    Hello, Can I get a refill on the Adderall, the 30 and the 5  Thanks  J

## 2021-09-10 RX ORDER — TRAMADOL HYDROCHLORIDE 50 MG/1
TABLET ORAL
Qty: 100 TABLET | Refills: 0 | Status: SHIPPED | OUTPATIENT
Start: 2021-09-10 | End: 2021-10-15

## 2021-09-13 RX ORDER — DEXTROAMPHETAMINE SACCHARATE, AMPHETAMINE ASPARTATE, DEXTROAMPHETAMINE SULFATE AND AMPHETAMINE SULFATE 1.25; 1.25; 1.25; 1.25 MG/1; MG/1; MG/1; MG/1
5 TABLET ORAL DAILY
Qty: 30 TABLET | Refills: 0 | Status: SHIPPED | OUTPATIENT
Start: 2021-10-14 | End: 2021-11-13

## 2021-09-13 RX ORDER — DEXTROAMPHETAMINE SACCHARATE, AMPHETAMINE ASPARTATE MONOHYDRATE, DEXTROAMPHETAMINE SULFATE AND AMPHETAMINE SULFATE 7.5; 7.5; 7.5; 7.5 MG/1; MG/1; MG/1; MG/1
30 CAPSULE, EXTENDED RELEASE ORAL DAILY
Qty: 30 CAPSULE | Refills: 0 | Status: SHIPPED | OUTPATIENT
Start: 2021-10-14 | End: 2021-11-13

## 2021-09-13 RX ORDER — DEXTROAMPHETAMINE SACCHARATE, AMPHETAMINE ASPARTATE, DEXTROAMPHETAMINE SULFATE AND AMPHETAMINE SULFATE 1.25; 1.25; 1.25; 1.25 MG/1; MG/1; MG/1; MG/1
5 TABLET ORAL DAILY
Qty: 30 TABLET | Refills: 0 | Status: SHIPPED | OUTPATIENT
Start: 2021-09-13 | End: 2021-10-12

## 2021-09-13 RX ORDER — DEXTROAMPHETAMINE SACCHARATE, AMPHETAMINE ASPARTATE MONOHYDRATE, DEXTROAMPHETAMINE SULFATE AND AMPHETAMINE SULFATE 7.5; 7.5; 7.5; 7.5 MG/1; MG/1; MG/1; MG/1
30 CAPSULE, EXTENDED RELEASE ORAL DAILY
Qty: 30 CAPSULE | Refills: 0 | Status: SHIPPED | OUTPATIENT
Start: 2021-09-13 | End: 2021-10-13

## 2021-09-13 RX ORDER — DEXTROAMPHETAMINE SACCHARATE, AMPHETAMINE ASPARTATE MONOHYDRATE, DEXTROAMPHETAMINE SULFATE AND AMPHETAMINE SULFATE 7.5; 7.5; 7.5; 7.5 MG/1; MG/1; MG/1; MG/1
30 CAPSULE, EXTENDED RELEASE ORAL DAILY
Qty: 30 CAPSULE | Refills: 0 | Status: SHIPPED | OUTPATIENT
Start: 2021-11-14 | End: 2021-12-14

## 2021-09-13 RX ORDER — DEXTROAMPHETAMINE SACCHARATE, AMPHETAMINE ASPARTATE, DEXTROAMPHETAMINE SULFATE AND AMPHETAMINE SULFATE 1.25; 1.25; 1.25; 1.25 MG/1; MG/1; MG/1; MG/1
5 TABLET ORAL DAILY
Qty: 30 TABLET | Refills: 0 | Status: SHIPPED | OUTPATIENT
Start: 2021-11-14 | End: 2021-12-14

## 2021-10-15 RX ORDER — TRAMADOL HYDROCHLORIDE 50 MG/1
TABLET ORAL
Qty: 100 TABLET | Refills: 0 | Status: SHIPPED | OUTPATIENT
Start: 2021-10-15 | End: 2021-11-10

## 2021-11-10 RX ORDER — TRAMADOL HYDROCHLORIDE 50 MG/1
TABLET ORAL
Qty: 100 TABLET | Refills: 0 | Status: SHIPPED | OUTPATIENT
Start: 2021-11-10 | End: 2021-12-29

## 2021-11-12 ENCOUNTER — PATIENT MESSAGE (OUTPATIENT)
Dept: FAMILY MEDICINE CLINIC | Facility: CLINIC | Age: 56
End: 2021-11-12

## 2021-11-23 NOTE — TELEPHONE ENCOUNTER
Did she really use #100 pills in 13 days? That is an average of 7+ pills per day. The prescription is for one every 6 hours as needed which would be 4 per day.

## 2021-11-26 RX ORDER — TRAMADOL HYDROCHLORIDE 50 MG/1
TABLET ORAL
Qty: 100 TABLET | Refills: 0 | OUTPATIENT
Start: 2021-11-26

## 2021-11-26 NOTE — TELEPHONE ENCOUNTER
LRF 11/10/21 #100      Called the pt to see if she really needs this refill-     nable to leave a message the mailbox is full    See refill request from 11/23/21

## 2021-12-01 RX ORDER — TRAMADOL HYDROCHLORIDE 50 MG/1
TABLET ORAL
Qty: 100 TABLET | Refills: 0 | OUTPATIENT
Start: 2021-12-01

## 2021-12-06 RX ORDER — LEVOTHYROXINE SODIUM 0.03 MG/1
TABLET ORAL
Qty: 90 TABLET | Refills: 0 | Status: SHIPPED | OUTPATIENT
Start: 2021-12-06

## 2021-12-06 NOTE — TELEPHONE ENCOUNTER
Please let patient or caregiver know or leave message that refill request for Levothyroxine has/have come from the pharmacy. The refills have been sent. The patient is due for a yearly physical and fasting labs.  Please help schedule this in the next week

## 2021-12-06 NOTE — TELEPHONE ENCOUNTER
Future Appointments   Date Time Provider Tobin Friend   12/8/2021  3:00 PM Jorge Heaton MD [facility] [place]

## 2021-12-08 ENCOUNTER — OFFICE VISIT (OUTPATIENT)
Dept: FAMILY MEDICINE CLINIC | Facility: CLINIC | Age: 56
End: 2021-12-08
Payer: COMMERCIAL

## 2021-12-08 VITALS
BODY MASS INDEX: 34.57 KG/M2 | TEMPERATURE: 98 F | HEIGHT: 65.5 IN | DIASTOLIC BLOOD PRESSURE: 100 MMHG | RESPIRATION RATE: 19 BRPM | OXYGEN SATURATION: 97 % | SYSTOLIC BLOOD PRESSURE: 150 MMHG | HEART RATE: 77 BPM | WEIGHT: 210 LBS

## 2021-12-08 DIAGNOSIS — Z12.31 ENCOUNTER FOR SCREENING MAMMOGRAM FOR MALIGNANT NEOPLASM OF BREAST: ICD-10-CM

## 2021-12-08 DIAGNOSIS — E03.9 HYPOTHYROIDISM, UNSPECIFIED TYPE: ICD-10-CM

## 2021-12-08 DIAGNOSIS — E78.5 HYPERLIPIDEMIA, UNSPECIFIED HYPERLIPIDEMIA TYPE: ICD-10-CM

## 2021-12-08 DIAGNOSIS — Z72.0 TOBACCO ABUSE: ICD-10-CM

## 2021-12-08 DIAGNOSIS — F32.A DEPRESSION, UNSPECIFIED DEPRESSION TYPE: ICD-10-CM

## 2021-12-08 DIAGNOSIS — Z00.00 WELL ADULT EXAM: Primary | ICD-10-CM

## 2021-12-08 DIAGNOSIS — F98.8 ATTENTION DEFICIT DISORDER, UNSPECIFIED HYPERACTIVITY PRESENCE: ICD-10-CM

## 2021-12-08 DIAGNOSIS — N95.2 PERIMENOPAUSAL ATROPHIC VAGINITIS: ICD-10-CM

## 2021-12-08 PROCEDURE — 90686 IIV4 VACC NO PRSV 0.5 ML IM: CPT | Performed by: FAMILY MEDICINE

## 2021-12-08 PROCEDURE — 84443 ASSAY THYROID STIM HORMONE: CPT | Performed by: FAMILY MEDICINE

## 2021-12-08 PROCEDURE — 84450 TRANSFERASE (AST) (SGOT): CPT | Performed by: FAMILY MEDICINE

## 2021-12-08 PROCEDURE — 90471 IMMUNIZATION ADMIN: CPT | Performed by: FAMILY MEDICINE

## 2021-12-08 PROCEDURE — 84481 FREE ASSAY (FT-3): CPT | Performed by: FAMILY MEDICINE

## 2021-12-08 PROCEDURE — 80061 LIPID PANEL: CPT | Performed by: FAMILY MEDICINE

## 2021-12-08 PROCEDURE — 84460 ALANINE AMINO (ALT) (SGPT): CPT | Performed by: FAMILY MEDICINE

## 2021-12-08 PROCEDURE — 99396 PREV VISIT EST AGE 40-64: CPT | Performed by: FAMILY MEDICINE

## 2021-12-08 PROCEDURE — 3080F DIAST BP >= 90 MM HG: CPT | Performed by: FAMILY MEDICINE

## 2021-12-08 PROCEDURE — 80048 BASIC METABOLIC PNL TOTAL CA: CPT | Performed by: FAMILY MEDICINE

## 2021-12-08 PROCEDURE — 84439 ASSAY OF FREE THYROXINE: CPT | Performed by: FAMILY MEDICINE

## 2021-12-08 PROCEDURE — 3008F BODY MASS INDEX DOCD: CPT | Performed by: FAMILY MEDICINE

## 2021-12-08 PROCEDURE — 85027 COMPLETE CBC AUTOMATED: CPT | Performed by: FAMILY MEDICINE

## 2021-12-08 PROCEDURE — 3077F SYST BP >= 140 MM HG: CPT | Performed by: FAMILY MEDICINE

## 2021-12-08 RX ORDER — LOSARTAN POTASSIUM 25 MG/1
25 TABLET ORAL DAILY
Qty: 90 TABLET | Refills: 0 | Status: SHIPPED | OUTPATIENT
Start: 2021-12-08 | End: 2021-12-12

## 2021-12-08 RX ORDER — ESTRADIOL 0.1 MG/G
1 CREAM VAGINAL
Qty: 42.5 G | Refills: 3 | Status: SHIPPED | OUTPATIENT
Start: 2021-12-08

## 2021-12-08 NOTE — PROGRESS NOTES
Geeta Roque is a 60year old female.     CC:  Patient presents with:  Physical: pe rpt      HPI:  Patient is here for yearly, wellness exam  Last Lipid:  Lab Results   Component Value Date    CHOLEST 259 (H) 09/29/2020    TRIG 181 (H) 09/29/2020    HDL Medication Sig Dispense Refill   • LEVOTHYROXINE 25 MCG Oral Tab TAKE 1 TABLET BEFORE BREAKFAST 90 tablet 0   • TRAMADOL 50 MG Oral Tab TAKE 1 TABLET BY MOUTH EVERY 6 HOURS AS NEEDED FOR PAIN 100 tablet 0   • Amphetamine-Dextroamphet ER (ADDERALL XR) 30 Smoking status: Current Every Day Smoker        Packs/day: 1.00        Years: 20.00        Pack years: 20      Smokeless tobacco: Never Used    Vaping Use      Vaping Use: Never used    Alcohol use: Not Currently      Comment: social    Drug use:  No (TRIIODOTHYRONINE)    2. Encounter for screening mammogram for malignant neoplasm of breast  Await results   - [facility] SCREENING BILAT (CPT=77067); Future    3.  Attention deficit disorder, unspecified hyperactivity presence  Stable   Continue present medicatio patient          Answer: Immediate      FLULAVAL INFLUENZA VACCINE QUAD PRESERVATIVE FREE 0.5 ML  [facility] SCREENING BILAT (CPT=77067)    Meds & Refills for this Visit:  Requested Prescriptions     Signed Prescriptions Disp Refills   • losartan 25 MG Oral Tab 9

## 2021-12-12 RX ORDER — LOSARTAN POTASSIUM 25 MG/1
TABLET ORAL
Qty: 90 TABLET | Refills: 3 | Status: SHIPPED | OUTPATIENT
Start: 2021-12-12

## 2021-12-13 ENCOUNTER — TELEPHONE (OUTPATIENT)
Dept: FAMILY MEDICINE CLINIC | Facility: CLINIC | Age: 56
End: 2021-12-13

## 2021-12-13 NOTE — TELEPHONE ENCOUNTER
----- Message from Rubi Barney MD sent at 12/13/2021 10:39 AM CST -----  Please let patient or caregiver know or leave message that:   Her lipids have gone up further. I recommend Lipitor 10 mg, 1 tab at bedtime, #90, 0 rf.  This is in an effort to reduc

## 2021-12-13 NOTE — TELEPHONE ENCOUNTER
Left message on voicemail/answering machine for patient to call office     Will need to place orders based on patient response

## 2021-12-14 NOTE — TELEPHONE ENCOUNTER
Will wait for patient response. Patient has reviewed my chart message with the results and recommendations for medication.

## 2021-12-29 ENCOUNTER — PATIENT MESSAGE (OUTPATIENT)
Dept: FAMILY MEDICINE CLINIC | Facility: CLINIC | Age: 56
End: 2021-12-29

## 2021-12-29 RX ORDER — DEXTROAMPHETAMINE SACCHARATE, AMPHETAMINE ASPARTATE, DEXTROAMPHETAMINE SULFATE AND AMPHETAMINE SULFATE 1.25; 1.25; 1.25; 1.25 MG/1; MG/1; MG/1; MG/1
5 TABLET ORAL DAILY
Qty: 30 TABLET | Refills: 0 | Status: SHIPPED | OUTPATIENT
Start: 2021-12-29 | End: 2022-01-28

## 2021-12-29 RX ORDER — TRAMADOL HYDROCHLORIDE 50 MG/1
50 TABLET ORAL EVERY 6 HOURS PRN
Qty: 100 TABLET | Refills: 0 | Status: SHIPPED | OUTPATIENT
Start: 2021-12-29 | End: 2022-01-31

## 2021-12-29 RX ORDER — BUPROPION HYDROCHLORIDE 150 MG/1
TABLET ORAL
Qty: 90 TABLET | Refills: 3 | Status: SHIPPED | OUTPATIENT
Start: 2021-12-29

## 2021-12-29 RX ORDER — DEXTROAMPHETAMINE SACCHARATE, AMPHETAMINE ASPARTATE MONOHYDRATE, DEXTROAMPHETAMINE SULFATE AND AMPHETAMINE SULFATE 7.5; 7.5; 7.5; 7.5 MG/1; MG/1; MG/1; MG/1
30 CAPSULE, EXTENDED RELEASE ORAL DAILY
Qty: 30 CAPSULE | Refills: 0 | Status: SHIPPED | OUTPATIENT
Start: 2022-01-29 | End: 2022-02-28

## 2021-12-29 RX ORDER — DEXTROAMPHETAMINE SACCHARATE, AMPHETAMINE ASPARTATE, DEXTROAMPHETAMINE SULFATE AND AMPHETAMINE SULFATE 1.25; 1.25; 1.25; 1.25 MG/1; MG/1; MG/1; MG/1
5 TABLET ORAL DAILY
Qty: 30 TABLET | Refills: 0 | Status: SHIPPED | OUTPATIENT
Start: 2022-01-29 | End: 2022-02-28

## 2021-12-29 RX ORDER — DEXTROAMPHETAMINE SACCHARATE, AMPHETAMINE ASPARTATE MONOHYDRATE, DEXTROAMPHETAMINE SULFATE AND AMPHETAMINE SULFATE 7.5; 7.5; 7.5; 7.5 MG/1; MG/1; MG/1; MG/1
30 CAPSULE, EXTENDED RELEASE ORAL DAILY
Qty: 30 CAPSULE | Refills: 0 | Status: SHIPPED | OUTPATIENT
Start: 2021-12-29 | End: 2022-01-28

## 2021-12-29 RX ORDER — DEXTROAMPHETAMINE SACCHARATE, AMPHETAMINE ASPARTATE MONOHYDRATE, DEXTROAMPHETAMINE SULFATE AND AMPHETAMINE SULFATE 7.5; 7.5; 7.5; 7.5 MG/1; MG/1; MG/1; MG/1
30 CAPSULE, EXTENDED RELEASE ORAL DAILY
Qty: 30 CAPSULE | Refills: 0 | Status: SHIPPED | OUTPATIENT
Start: 2022-03-01 | End: 2022-03-31

## 2021-12-29 RX ORDER — DEXTROAMPHETAMINE SACCHARATE, AMPHETAMINE ASPARTATE, DEXTROAMPHETAMINE SULFATE AND AMPHETAMINE SULFATE 1.25; 1.25; 1.25; 1.25 MG/1; MG/1; MG/1; MG/1
5 TABLET ORAL DAILY
Qty: 30 TABLET | Refills: 0 | Status: SHIPPED | OUTPATIENT
Start: 2022-03-01 | End: 2022-03-31

## 2021-12-29 NOTE — TELEPHONE ENCOUNTER
From: Malcolm Roy  To: Selene Damon MD  Sent: 12/29/2021 11:05 AM CST  Subject: Kirill Good  I need to get refills on my Adderall. 30 & 5.   Thanks

## 2021-12-29 NOTE — TELEPHONE ENCOUNTER
Routing to provider per protocol. TRAMADOL 50 MG Oral Tab  Last refilled on 11/10/21 for #100  with 0 rf. Last labs 12/8/21. Last seen on 12/8/21.      Future Appointments   Date Time Provider Tobin Ronna   1/19/2022  2:40 PM Tonja Taylor MD

## 2021-12-29 NOTE — TELEPHONE ENCOUNTER
Routing to provider per protocol. BUPROPION HCL ER, XL, 150 MG Oral Tablet 24 Hr  Last refilled on 7/22/21 for #90  with 1 rf. Last labs 12/8/21. Last seen on 12/8/21.      Future Appointments   Date Time Provider Tobin Friend   1/19/2022  2:40 P

## 2022-01-11 ENCOUNTER — TELEPHONE (OUTPATIENT)
Dept: FAMILY MEDICINE CLINIC | Facility: CLINIC | Age: 57
End: 2022-01-11

## 2022-01-19 ENCOUNTER — OFFICE VISIT (OUTPATIENT)
Dept: FAMILY MEDICINE CLINIC | Facility: CLINIC | Age: 57
End: 2022-01-19
Payer: COMMERCIAL

## 2022-01-19 VITALS
SYSTOLIC BLOOD PRESSURE: 130 MMHG | HEIGHT: 65.5 IN | WEIGHT: 214.38 LBS | TEMPERATURE: 98 F | DIASTOLIC BLOOD PRESSURE: 78 MMHG | BODY MASS INDEX: 35.29 KG/M2

## 2022-01-19 DIAGNOSIS — I10 HYPERTENSION, UNSPECIFIED TYPE: Primary | ICD-10-CM

## 2022-01-19 DIAGNOSIS — R60.0 BILATERAL LEG EDEMA: ICD-10-CM

## 2022-01-19 LAB
ANION GAP SERPL CALC-SCNC: 3 MMOL/L (ref 0–18)
BUN BLD-MCNC: 13 MG/DL (ref 7–18)
CALCIUM BLD-MCNC: 9.8 MG/DL (ref 8.5–10.1)
CHLORIDE SERPL-SCNC: 107 MMOL/L (ref 98–112)
CO2 SERPL-SCNC: 29 MMOL/L (ref 21–32)
CREAT BLD-MCNC: 0.77 MG/DL
FASTING STATUS PATIENT QL REPORTED: NO
GLUCOSE BLD-MCNC: 93 MG/DL (ref 70–99)
OSMOLALITY SERPL CALC.SUM OF ELEC: 288 MOSM/KG (ref 275–295)
POTASSIUM SERPL-SCNC: 3.8 MMOL/L (ref 3.5–5.1)
SODIUM SERPL-SCNC: 139 MMOL/L (ref 136–145)

## 2022-01-19 PROCEDURE — 80048 BASIC METABOLIC PNL TOTAL CA: CPT | Performed by: FAMILY MEDICINE

## 2022-01-19 PROCEDURE — 3008F BODY MASS INDEX DOCD: CPT | Performed by: FAMILY MEDICINE

## 2022-01-19 PROCEDURE — 3075F SYST BP GE 130 - 139MM HG: CPT | Performed by: FAMILY MEDICINE

## 2022-01-19 PROCEDURE — 99214 OFFICE O/P EST MOD 30 MIN: CPT | Performed by: FAMILY MEDICINE

## 2022-01-19 PROCEDURE — 3078F DIAST BP <80 MM HG: CPT | Performed by: FAMILY MEDICINE

## 2022-01-19 RX ORDER — HYDROCHLOROTHIAZIDE 12.5 MG/1
12.5 TABLET ORAL DAILY
Qty: 90 TABLET | Refills: 0 | Status: SHIPPED | OUTPATIENT
Start: 2022-01-19 | End: 2022-01-31

## 2022-01-19 NOTE — PROGRESS NOTES
Jaylon Figueroa is a 60year old female. CC:  Patient presents with:  Medication Follow-Up      HPI:  Here to follow up hypertension. Started on Losartan 25 mg every day about 4 weeks ago.   Home BP readings: none  Medication side effects: none  Symptom DAILY 90 tablet 3   • estradiol 0.1 MG/GM Vaginal Cream Place 1 g vaginally twice a week.  42.5 g 3   • LEVOTHYROXINE 25 MCG Oral Tab TAKE 1 TABLET BEFORE BREAKFAST 90 tablet 0   • Albuterol Sulfate  (90 Base) MCG/ACT Inhalation Aero Soln Inhale 2 pu (Temporal)   Ht 5' 5.5\" (1.664 m)   Wt 214 lb 6.4 oz (97.3 kg)   BMI 35.14 kg/m²    Reviewed by Cosmo Talley M.D.  BP Readings from Last 5 Encounters:  01/19/22 : 130/78  12/08/21 : (!) 150/100  09/29/20 : (!) 150/100  08/27/19 : 142/86  05/04/19 : 136/78 months      Authorized by Elkin Geronimo M.D.

## 2022-01-31 RX ORDER — HYDROCHLOROTHIAZIDE 12.5 MG/1
TABLET ORAL
Qty: 90 TABLET | Refills: 1 | Status: SHIPPED | OUTPATIENT
Start: 2022-01-31

## 2022-01-31 RX ORDER — TRAMADOL HYDROCHLORIDE 50 MG/1
TABLET ORAL
Qty: 100 TABLET | Refills: 0 | Status: SHIPPED | OUTPATIENT
Start: 2022-01-31

## 2022-02-08 ENCOUNTER — PATIENT MESSAGE (OUTPATIENT)
Dept: FAMILY MEDICINE CLINIC | Facility: CLINIC | Age: 57
End: 2022-02-08

## 2022-02-09 NOTE — TELEPHONE ENCOUNTER
From: Pawel Kimball  To: Danna Archuleta MD  Sent: 2/8/2022 8:16 PM CST  Subject: rash    Hi Dr Emre Herrera,  I know this is gross, but can u tell from this picture I attached, if this is Ringworm on the back of my neck?  if it is, what should I do about it? Thanks.

## 2022-02-28 RX ORDER — TRAMADOL HYDROCHLORIDE 50 MG/1
TABLET ORAL
Qty: 100 TABLET | Refills: 0 | Status: SHIPPED | OUTPATIENT
Start: 2022-02-28

## 2022-02-28 RX ORDER — CITALOPRAM 40 MG/1
TABLET ORAL
Qty: 90 TABLET | Refills: 1 | Status: SHIPPED | OUTPATIENT
Start: 2022-02-28

## 2022-02-28 NOTE — TELEPHONE ENCOUNTER
Routing to provider per protocol. TRAMADOL 50 MG Oral Tab  Last refilled on 1/31/22 for #100  with 0 rf. Last labs 1/19/22. Last seen on 1/19/22. No future appointments. Thank you.

## 2022-02-28 NOTE — TELEPHONE ENCOUNTER
Routing to provider per protocol. Citalopram Hydrobromide 40 MG Oral Tab  Last refilled on 6/22/21 for #90  with 1 rf. Last labs 1/19/22. Last seen on 1/19/22. No future appointments. Thank you.

## 2022-03-07 RX ORDER — LEVOTHYROXINE SODIUM 0.03 MG/1
TABLET ORAL
Qty: 90 TABLET | Refills: 2 | Status: SHIPPED | OUTPATIENT
Start: 2022-03-07

## 2022-03-07 NOTE — TELEPHONE ENCOUNTER
Thyroid Supplements Protocol Failed 03/07/2022 12:05 AM   Protocol Details  TSH value between 0.350 and 5.500 IU/ml    TSH test in past 12 months    Appointment in past 12 or next 3 months        Routing to provider per protocol. LEVOTHYROXINE 25 MCG Oral Tab  Last refilled on 12/6/21 for #90  with 0 rf. Last labs 12/8/21. Last seen on 1/19/22. No future appointments. Thank you.

## 2022-04-10 RX ORDER — TRAMADOL HYDROCHLORIDE 50 MG/1
TABLET ORAL
Qty: 100 TABLET | Refills: 0 | Status: SHIPPED | OUTPATIENT
Start: 2022-04-10

## 2022-04-25 ENCOUNTER — PATIENT MESSAGE (OUTPATIENT)
Dept: FAMILY MEDICINE CLINIC | Facility: CLINIC | Age: 57
End: 2022-04-25

## 2022-04-26 ENCOUNTER — PATIENT MESSAGE (OUTPATIENT)
Dept: FAMILY MEDICINE CLINIC | Facility: CLINIC | Age: 57
End: 2022-04-26

## 2022-04-26 RX ORDER — DEXTROAMPHETAMINE SACCHARATE, AMPHETAMINE ASPARTATE MONOHYDRATE, DEXTROAMPHETAMINE SULFATE AND AMPHETAMINE SULFATE 7.5; 7.5; 7.5; 7.5 MG/1; MG/1; MG/1; MG/1
30 CAPSULE, EXTENDED RELEASE ORAL DAILY
Qty: 30 CAPSULE | Refills: 0 | Status: SHIPPED | OUTPATIENT
Start: 2022-04-26 | End: 2022-05-26

## 2022-04-26 RX ORDER — DEXTROAMPHETAMINE SACCHARATE, AMPHETAMINE ASPARTATE, DEXTROAMPHETAMINE SULFATE AND AMPHETAMINE SULFATE 1.25; 1.25; 1.25; 1.25 MG/1; MG/1; MG/1; MG/1
5 TABLET ORAL DAILY
Qty: 30 TABLET | Refills: 0 | Status: SHIPPED | OUTPATIENT
Start: 2022-06-27 | End: 2022-07-27

## 2022-04-26 RX ORDER — DEXTROAMPHETAMINE SACCHARATE, AMPHETAMINE ASPARTATE, DEXTROAMPHETAMINE SULFATE AND AMPHETAMINE SULFATE 1.25; 1.25; 1.25; 1.25 MG/1; MG/1; MG/1; MG/1
5 TABLET ORAL DAILY
Qty: 30 TABLET | Refills: 0 | Status: SHIPPED | OUTPATIENT
Start: 2022-04-26 | End: 2022-05-25

## 2022-04-26 RX ORDER — DEXTROAMPHETAMINE SACCHARATE, AMPHETAMINE ASPARTATE MONOHYDRATE, DEXTROAMPHETAMINE SULFATE AND AMPHETAMINE SULFATE 7.5; 7.5; 7.5; 7.5 MG/1; MG/1; MG/1; MG/1
30 CAPSULE, EXTENDED RELEASE ORAL DAILY
Qty: 30 CAPSULE | Refills: 0 | Status: SHIPPED | OUTPATIENT
Start: 2022-05-27 | End: 2022-06-26

## 2022-04-26 RX ORDER — DEXTROAMPHETAMINE SACCHARATE, AMPHETAMINE ASPARTATE MONOHYDRATE, DEXTROAMPHETAMINE SULFATE AND AMPHETAMINE SULFATE 7.5; 7.5; 7.5; 7.5 MG/1; MG/1; MG/1; MG/1
30 CAPSULE, EXTENDED RELEASE ORAL DAILY
Qty: 30 CAPSULE | Refills: 0 | Status: SHIPPED | OUTPATIENT
Start: 2022-06-27 | End: 2022-07-27

## 2022-04-26 RX ORDER — DEXTROAMPHETAMINE SACCHARATE, AMPHETAMINE ASPARTATE, DEXTROAMPHETAMINE SULFATE AND AMPHETAMINE SULFATE 1.25; 1.25; 1.25; 1.25 MG/1; MG/1; MG/1; MG/1
5 TABLET ORAL DAILY
Qty: 30 TABLET | Refills: 0 | Status: SHIPPED | OUTPATIENT
Start: 2022-05-27 | End: 2022-06-26

## 2022-04-26 NOTE — TELEPHONE ENCOUNTER
From: Pawel Kimball  To: Danna Archuleta MD  Sent: 4/25/2022 10:17 PM CDT  Subject: refill     Hi Dr Erme Herrera, I need persciptions for Adderall sent to [facility] in [place]. 30mg & 5mg.   Thanks  Lyle Cornejo

## 2022-05-09 NOTE — TELEPHONE ENCOUNTER
Last refilled on 4/10/22 for # 100 with 0 refills  Last OV 1/19/22  No future appointments. Thank you.

## 2022-05-10 RX ORDER — TRAMADOL HYDROCHLORIDE 50 MG/1
TABLET ORAL
Qty: 100 TABLET | Refills: 0 | Status: SHIPPED | OUTPATIENT
Start: 2022-05-10

## 2022-05-17 RX ORDER — CITALOPRAM 40 MG/1
TABLET ORAL
Qty: 90 TABLET | Refills: 1 | Status: SHIPPED | OUTPATIENT
Start: 2022-05-17

## 2022-05-21 NOTE — TELEPHONE ENCOUNTER
PT CALLED AND ADV NEEDS REFILLS OF       Amphetamine-Dextroamphet ER (ADDERALL XR) 30 MG Oral Capsule SR 24 Hr    AND     amphetamine-dextroamphetamine 5 MG Oral Tab      PLEASE CALL WHEN READY FOR P/U    THANK YOU No

## 2022-05-31 RX ORDER — ALBUTEROL SULFATE 90 UG/1
AEROSOL, METERED RESPIRATORY (INHALATION)
Qty: 25.5 G | Refills: 3 | Status: SHIPPED | OUTPATIENT
Start: 2022-05-31

## 2022-06-26 RX ORDER — TRAMADOL HYDROCHLORIDE 50 MG/1
TABLET ORAL
Qty: 100 TABLET | Refills: 0 | Status: SHIPPED | OUTPATIENT
Start: 2022-06-26

## 2022-07-20 RX ORDER — TRAMADOL HYDROCHLORIDE 50 MG/1
TABLET ORAL
Qty: 100 TABLET | Refills: 0 | Status: SHIPPED | OUTPATIENT
Start: 2022-07-20

## 2022-07-21 RX ORDER — HYDROCHLOROTHIAZIDE 12.5 MG/1
TABLET ORAL
Qty: 90 TABLET | Refills: 1 | Status: SHIPPED | OUTPATIENT
Start: 2022-07-21

## 2022-07-31 ENCOUNTER — PATIENT MESSAGE (OUTPATIENT)
Dept: FAMILY MEDICINE CLINIC | Facility: CLINIC | Age: 57
End: 2022-07-31

## 2022-07-31 RX ORDER — DEXTROAMPHETAMINE SACCHARATE, AMPHETAMINE ASPARTATE, DEXTROAMPHETAMINE SULFATE AND AMPHETAMINE SULFATE 1.25; 1.25; 1.25; 1.25 MG/1; MG/1; MG/1; MG/1
5 TABLET ORAL DAILY
Qty: 30 TABLET | Refills: 0 | Status: SHIPPED | OUTPATIENT
Start: 2022-07-31 | End: 2022-08-30

## 2022-07-31 RX ORDER — DEXTROAMPHETAMINE SACCHARATE, AMPHETAMINE ASPARTATE MONOHYDRATE, DEXTROAMPHETAMINE SULFATE AND AMPHETAMINE SULFATE 7.5; 7.5; 7.5; 7.5 MG/1; MG/1; MG/1; MG/1
30 CAPSULE, EXTENDED RELEASE ORAL EVERY MORNING
Qty: 30 CAPSULE | Refills: 0 | Status: SHIPPED | OUTPATIENT
Start: 2022-07-31 | End: 2022-08-30

## 2022-08-01 NOTE — TELEPHONE ENCOUNTER
From: Kun Smith  To: Lorraine Olivas MD  Sent: 7/31/2022 10:47 AM CDT  Subject: refills    Hi, I need the Adderall script sent to [facility].  30 and 5 Thanks

## 2022-08-31 ENCOUNTER — OFFICE VISIT (OUTPATIENT)
Dept: FAMILY MEDICINE CLINIC | Facility: CLINIC | Age: 57
End: 2022-08-31
Payer: COMMERCIAL

## 2022-08-31 VITALS
SYSTOLIC BLOOD PRESSURE: 138 MMHG | WEIGHT: 228 LBS | BODY MASS INDEX: 37 KG/M2 | HEART RATE: 85 BPM | DIASTOLIC BLOOD PRESSURE: 88 MMHG | OXYGEN SATURATION: 93 % | TEMPERATURE: 99 F

## 2022-08-31 DIAGNOSIS — H69.82 DYSFUNCTION OF LEFT EUSTACHIAN TUBE: Primary | ICD-10-CM

## 2022-08-31 DIAGNOSIS — R63.5 WEIGHT GAIN: ICD-10-CM

## 2022-08-31 DIAGNOSIS — E03.9 HYPOTHYROIDISM, UNSPECIFIED TYPE: ICD-10-CM

## 2022-08-31 LAB
T3FREE SERPL-MCNC: 2.88 PG/ML (ref 2.4–4.2)
T4 FREE SERPL-MCNC: 1.2 NG/DL (ref 0.8–1.7)
TSI SER-ACNC: 5.32 MIU/ML (ref 0.36–3.74)

## 2022-08-31 PROCEDURE — 3079F DIAST BP 80-89 MM HG: CPT | Performed by: FAMILY MEDICINE

## 2022-08-31 PROCEDURE — 99214 OFFICE O/P EST MOD 30 MIN: CPT | Performed by: FAMILY MEDICINE

## 2022-08-31 PROCEDURE — 84481 FREE ASSAY (FT-3): CPT | Performed by: FAMILY MEDICINE

## 2022-08-31 PROCEDURE — 84439 ASSAY OF FREE THYROXINE: CPT | Performed by: FAMILY MEDICINE

## 2022-08-31 PROCEDURE — 3075F SYST BP GE 130 - 139MM HG: CPT | Performed by: FAMILY MEDICINE

## 2022-08-31 PROCEDURE — 84443 ASSAY THYROID STIM HORMONE: CPT | Performed by: FAMILY MEDICINE

## 2022-09-02 RX ORDER — LEVOTHYROXINE SODIUM 0.03 MG/1
25 TABLET ORAL
Qty: 90 TABLET | Refills: 2 | Status: SHIPPED | OUTPATIENT
Start: 2022-09-02

## 2022-09-02 RX ORDER — CITALOPRAM 40 MG/1
40 TABLET ORAL DAILY
Qty: 90 TABLET | Refills: 2 | Status: SHIPPED | OUTPATIENT
Start: 2022-09-02

## 2022-09-02 RX ORDER — ESTRADIOL 0.1 MG/G
1 CREAM VAGINAL
Qty: 42.5 G | Refills: 2 | Status: SHIPPED | OUTPATIENT
Start: 2022-09-02

## 2022-09-02 RX ORDER — LOSARTAN POTASSIUM 25 MG/1
25 TABLET ORAL DAILY
Qty: 90 TABLET | Refills: 2 | Status: SHIPPED | OUTPATIENT
Start: 2022-09-02

## 2022-09-02 RX ORDER — BUPROPION HYDROCHLORIDE 150 MG/1
150 TABLET ORAL EVERY MORNING
Qty: 90 TABLET | Refills: 2 | Status: SHIPPED | OUTPATIENT
Start: 2022-09-02

## 2022-09-02 RX ORDER — HYDROCHLOROTHIAZIDE 12.5 MG/1
12.5 TABLET ORAL DAILY
Qty: 90 TABLET | Refills: 2 | Status: SHIPPED | OUTPATIENT
Start: 2022-09-02

## 2022-09-03 ENCOUNTER — PATIENT MESSAGE (OUTPATIENT)
Dept: FAMILY MEDICINE CLINIC | Facility: CLINIC | Age: 57
End: 2022-09-03

## 2022-09-06 NOTE — TELEPHONE ENCOUNTER
From: Lang Riddle  To: Ellen Chen MD  Sent: 9/3/2022 1:12 PM CDT  Subject: Thyroid Meds    Hi. If you could send a new script for thyroid meds that would be great.   Thank You

## 2022-09-08 ENCOUNTER — PATIENT MESSAGE (OUTPATIENT)
Dept: FAMILY MEDICINE CLINIC | Facility: CLINIC | Age: 57
End: 2022-09-08

## 2022-09-08 RX ORDER — DEXTROAMPHETAMINE SACCHARATE, AMPHETAMINE ASPARTATE, DEXTROAMPHETAMINE SULFATE AND AMPHETAMINE SULFATE 1.25; 1.25; 1.25; 1.25 MG/1; MG/1; MG/1; MG/1
5 TABLET ORAL DAILY
Qty: 30 TABLET | Refills: 0 | Status: SHIPPED | OUTPATIENT
Start: 2022-09-08 | End: 2022-10-08

## 2022-09-08 RX ORDER — DEXTROAMPHETAMINE SACCHARATE, AMPHETAMINE ASPARTATE MONOHYDRATE, DEXTROAMPHETAMINE SULFATE AND AMPHETAMINE SULFATE 7.5; 7.5; 7.5; 7.5 MG/1; MG/1; MG/1; MG/1
30 CAPSULE, EXTENDED RELEASE ORAL EVERY MORNING
Qty: 30 CAPSULE | Refills: 0 | Status: SHIPPED | OUTPATIENT
Start: 2022-09-08 | End: 2022-10-08

## 2022-09-30 RX ORDER — LEVOTHYROXINE SODIUM 0.03 MG/1
25 TABLET ORAL
Qty: 90 TABLET | Refills: 2 | Status: SHIPPED | OUTPATIENT
Start: 2022-09-30

## 2022-10-05 RX ORDER — TRAMADOL HYDROCHLORIDE 50 MG/1
TABLET ORAL
Qty: 100 TABLET | Refills: 0 | Status: SHIPPED | OUTPATIENT
Start: 2022-10-05

## 2022-10-13 ENCOUNTER — PATIENT MESSAGE (OUTPATIENT)
Dept: FAMILY MEDICINE CLINIC | Facility: CLINIC | Age: 57
End: 2022-10-13

## 2022-10-13 RX ORDER — DEXTROAMPHETAMINE SACCHARATE, AMPHETAMINE ASPARTATE MONOHYDRATE, DEXTROAMPHETAMINE SULFATE AND AMPHETAMINE SULFATE 7.5; 7.5; 7.5; 7.5 MG/1; MG/1; MG/1; MG/1
30 CAPSULE, EXTENDED RELEASE ORAL DAILY
Qty: 30 CAPSULE | Refills: 0 | Status: SHIPPED | OUTPATIENT
Start: 2022-10-13 | End: 2022-11-12

## 2022-10-13 RX ORDER — DEXTROAMPHETAMINE SACCHARATE, AMPHETAMINE ASPARTATE, DEXTROAMPHETAMINE SULFATE AND AMPHETAMINE SULFATE 1.25; 1.25; 1.25; 1.25 MG/1; MG/1; MG/1; MG/1
5 TABLET ORAL DAILY
Qty: 30 TABLET | Refills: 0 | Status: SHIPPED | OUTPATIENT
Start: 2022-12-14 | End: 2023-01-13

## 2022-10-13 RX ORDER — DEXTROAMPHETAMINE SACCHARATE, AMPHETAMINE ASPARTATE, DEXTROAMPHETAMINE SULFATE AND AMPHETAMINE SULFATE 1.25; 1.25; 1.25; 1.25 MG/1; MG/1; MG/1; MG/1
5 TABLET ORAL DAILY
Qty: 30 TABLET | Refills: 0 | Status: SHIPPED | OUTPATIENT
Start: 2022-10-13 | End: 2022-11-12

## 2022-10-13 RX ORDER — DEXTROAMPHETAMINE SACCHARATE, AMPHETAMINE ASPARTATE, DEXTROAMPHETAMINE SULFATE AND AMPHETAMINE SULFATE 1.25; 1.25; 1.25; 1.25 MG/1; MG/1; MG/1; MG/1
5 TABLET ORAL DAILY
Qty: 30 TABLET | Refills: 0 | Status: SHIPPED | OUTPATIENT
Start: 2022-11-13 | End: 2022-12-13

## 2022-10-13 RX ORDER — LEVOTHYROXINE SODIUM 0.03 MG/1
25 TABLET ORAL
Qty: 90 TABLET | Refills: 1 | Status: SHIPPED | OUTPATIENT
Start: 2022-10-13 | End: 2022-12-20

## 2022-10-13 RX ORDER — DEXTROAMPHETAMINE SACCHARATE, AMPHETAMINE ASPARTATE MONOHYDRATE, DEXTROAMPHETAMINE SULFATE AND AMPHETAMINE SULFATE 7.5; 7.5; 7.5; 7.5 MG/1; MG/1; MG/1; MG/1
30 CAPSULE, EXTENDED RELEASE ORAL DAILY
Qty: 30 CAPSULE | Refills: 0 | Status: SHIPPED | OUTPATIENT
Start: 2022-12-14 | End: 2023-01-13

## 2022-10-13 RX ORDER — DEXTROAMPHETAMINE SACCHARATE, AMPHETAMINE ASPARTATE MONOHYDRATE, DEXTROAMPHETAMINE SULFATE AND AMPHETAMINE SULFATE 7.5; 7.5; 7.5; 7.5 MG/1; MG/1; MG/1; MG/1
30 CAPSULE, EXTENDED RELEASE ORAL DAILY
Qty: 30 CAPSULE | Refills: 0 | Status: SHIPPED | OUTPATIENT
Start: 2022-11-13 | End: 2022-12-13

## 2022-10-25 RX ORDER — TRAMADOL HYDROCHLORIDE 50 MG/1
TABLET ORAL
Qty: 100 TABLET | Refills: 0 | Status: SHIPPED | OUTPATIENT
Start: 2022-10-25

## 2022-11-21 RX ORDER — ALBUTEROL SULFATE 90 UG/1
AEROSOL, METERED RESPIRATORY (INHALATION)
Qty: 25.5 G | Refills: 3 | Status: SHIPPED | OUTPATIENT
Start: 2022-11-21

## 2022-12-08 RX ORDER — TRAMADOL HYDROCHLORIDE 50 MG/1
TABLET ORAL
Qty: 100 TABLET | Refills: 0 | Status: SHIPPED | OUTPATIENT
Start: 2022-12-08

## 2022-12-19 ENCOUNTER — PATIENT MESSAGE (OUTPATIENT)
Dept: FAMILY MEDICINE CLINIC | Facility: CLINIC | Age: 57
End: 2022-12-19

## 2022-12-20 ENCOUNTER — PATIENT MESSAGE (OUTPATIENT)
Dept: FAMILY MEDICINE CLINIC | Facility: CLINIC | Age: 57
End: 2022-12-20

## 2022-12-20 ENCOUNTER — TELEPHONE (OUTPATIENT)
Dept: FAMILY MEDICINE CLINIC | Facility: CLINIC | Age: 57
End: 2022-12-20

## 2022-12-20 RX ORDER — ALBUTEROL SULFATE 90 UG/1
2 AEROSOL, METERED RESPIRATORY (INHALATION) EVERY 4 HOURS PRN
Qty: 3 EACH | Refills: 3 | Status: SHIPPED | OUTPATIENT
Start: 2022-12-20

## 2022-12-20 RX ORDER — LEVOTHYROXINE SODIUM 0.05 MG/1
50 TABLET ORAL
Qty: 90 TABLET | Refills: 0 | Status: SHIPPED | OUTPATIENT
Start: 2022-12-20 | End: 2022-12-20

## 2022-12-20 RX ORDER — LEVOTHYROXINE SODIUM 0.05 MG/1
50 TABLET ORAL
Qty: 90 TABLET | Refills: 0 | Status: SHIPPED | OUTPATIENT
Start: 2022-12-20

## 2022-12-20 RX ORDER — LEVOTHYROXINE SODIUM 0.03 MG/1
25 TABLET ORAL
Qty: 90 TABLET | Refills: 1 | Status: CANCELLED | OUTPATIENT
Start: 2022-12-20

## 2022-12-20 NOTE — TELEPHONE ENCOUNTER
Check to see if patient is in need of refill  Last refill 10/13/2022 90 day supple and 1 refill    Left message to call back

## 2022-12-20 NOTE — TELEPHONE ENCOUNTER
Patient's name and  verified   Patient has enough of her Levothyroxine medication and doesn't need a refill  Patient notified and bong balized an understanding

## 2022-12-20 NOTE — TELEPHONE ENCOUNTER
Please let patient or caregiver know or leave message that: the Albuterol was sent to [facility].   Thanks

## 2022-12-20 NOTE — TELEPHONE ENCOUNTER
Patient stated she just did get a Albuterol inhaler but can always use another one.  I    Last OV:08/31/2022  Last refill:11/21/2022 25.5g 3 ordered    Medication pended, please sign if appropriate

## 2022-12-27 RX ORDER — LEVOTHYROXINE SODIUM 0.05 MG/1
50 TABLET ORAL
Qty: 90 TABLET | Refills: 2 | Status: SHIPPED | OUTPATIENT
Start: 2022-12-27

## 2022-12-30 RX ORDER — LEVOTHYROXINE SODIUM 0.03 MG/1
TABLET ORAL
Qty: 90 TABLET | Refills: 3 | OUTPATIENT
Start: 2022-12-30

## 2023-01-04 RX ORDER — TRAMADOL HYDROCHLORIDE 50 MG/1
TABLET ORAL
Qty: 100 TABLET | Refills: 0 | Status: SHIPPED | OUTPATIENT
Start: 2023-01-04

## 2023-01-06 ENCOUNTER — PATIENT MESSAGE (OUTPATIENT)
Dept: FAMILY MEDICINE CLINIC | Facility: CLINIC | Age: 58
End: 2023-01-06

## 2023-01-12 RX ORDER — LEVOTHYROXINE SODIUM 0.2 MG/1
200 TABLET ORAL DAILY
Qty: 90 TABLET | Refills: 2 | Status: SHIPPED | OUTPATIENT
Start: 2023-01-12

## 2023-02-07 RX ORDER — TRAMADOL HYDROCHLORIDE 50 MG/1
TABLET ORAL
Qty: 100 TABLET | Refills: 0 | Status: SHIPPED | OUTPATIENT
Start: 2023-02-07

## 2023-02-09 ENCOUNTER — PATIENT MESSAGE (OUTPATIENT)
Dept: FAMILY MEDICINE CLINIC | Facility: CLINIC | Age: 58
End: 2023-02-09

## 2023-02-09 RX ORDER — DEXTROAMPHETAMINE SACCHARATE, AMPHETAMINE ASPARTATE MONOHYDRATE, DEXTROAMPHETAMINE SULFATE AND AMPHETAMINE SULFATE 7.5; 7.5; 7.5; 7.5 MG/1; MG/1; MG/1; MG/1
30 CAPSULE, EXTENDED RELEASE ORAL EVERY MORNING
Qty: 30 CAPSULE | Refills: 0 | Status: SHIPPED | OUTPATIENT
Start: 2023-02-09

## 2023-02-09 RX ORDER — DEXTROAMPHETAMINE SACCHARATE, AMPHETAMINE ASPARTATE, DEXTROAMPHETAMINE SULFATE AND AMPHETAMINE SULFATE 1.25; 1.25; 1.25; 1.25 MG/1; MG/1; MG/1; MG/1
5 TABLET ORAL DAILY
Qty: 30 TABLET | Refills: 0 | Status: SHIPPED | OUTPATIENT
Start: 2023-02-09

## 2023-03-12 RX ORDER — TRAMADOL HYDROCHLORIDE 50 MG/1
TABLET ORAL
Qty: 100 TABLET | Refills: 0 | Status: SHIPPED | OUTPATIENT
Start: 2023-03-12

## 2023-03-17 RX ORDER — DEXTROAMPHETAMINE SACCHARATE, AMPHETAMINE ASPARTATE MONOHYDRATE, DEXTROAMPHETAMINE SULFATE AND AMPHETAMINE SULFATE 7.5; 7.5; 7.5; 7.5 MG/1; MG/1; MG/1; MG/1
30 CAPSULE, EXTENDED RELEASE ORAL EVERY MORNING
Qty: 30 CAPSULE | Refills: 0 | Status: SHIPPED | OUTPATIENT
Start: 2023-03-17

## 2023-03-17 RX ORDER — DEXTROAMPHETAMINE SACCHARATE, AMPHETAMINE ASPARTATE, DEXTROAMPHETAMINE SULFATE AND AMPHETAMINE SULFATE 1.25; 1.25; 1.25; 1.25 MG/1; MG/1; MG/1; MG/1
5 TABLET ORAL DAILY
Qty: 30 TABLET | Refills: 0 | Status: SHIPPED | OUTPATIENT
Start: 2023-03-17

## 2023-04-07 NOTE — TELEPHONE ENCOUNTER
Received paperwork from [facility] for a refill request on levothyroxine    Pended medication, sign if appropriate.

## 2023-04-09 RX ORDER — LEVOTHYROXINE SODIUM 0.05 MG/1
50 TABLET ORAL
Qty: 90 TABLET | Refills: 2 | Status: SHIPPED | OUTPATIENT
Start: 2023-04-09

## 2023-04-18 RX ORDER — TRAMADOL HYDROCHLORIDE 50 MG/1
50 TABLET ORAL EVERY 6 HOURS PRN
Qty: 100 TABLET | Refills: 0 | Status: SHIPPED | OUTPATIENT
Start: 2023-04-18

## 2023-04-18 RX ORDER — DEXTROAMPHETAMINE SACCHARATE, AMPHETAMINE ASPARTATE MONOHYDRATE, DEXTROAMPHETAMINE SULFATE AND AMPHETAMINE SULFATE 7.5; 7.5; 7.5; 7.5 MG/1; MG/1; MG/1; MG/1
30 CAPSULE, EXTENDED RELEASE ORAL EVERY MORNING
Qty: 30 CAPSULE | Refills: 0 | Status: SHIPPED | OUTPATIENT
Start: 2023-04-18

## 2023-04-18 RX ORDER — DEXTROAMPHETAMINE SACCHARATE, AMPHETAMINE ASPARTATE, DEXTROAMPHETAMINE SULFATE AND AMPHETAMINE SULFATE 1.25; 1.25; 1.25; 1.25 MG/1; MG/1; MG/1; MG/1
5 TABLET ORAL DAILY
Qty: 30 TABLET | Refills: 0 | Status: SHIPPED | OUTPATIENT
Start: 2023-04-18

## 2023-05-26 RX ORDER — DEXTROAMPHETAMINE SACCHARATE, AMPHETAMINE ASPARTATE MONOHYDRATE, DEXTROAMPHETAMINE SULFATE AND AMPHETAMINE SULFATE 7.5; 7.5; 7.5; 7.5 MG/1; MG/1; MG/1; MG/1
30 CAPSULE, EXTENDED RELEASE ORAL EVERY MORNING
Qty: 30 CAPSULE | Refills: 0 | Status: SHIPPED | OUTPATIENT
Start: 2023-05-26

## 2023-05-26 RX ORDER — DEXTROAMPHETAMINE SACCHARATE, AMPHETAMINE ASPARTATE, DEXTROAMPHETAMINE SULFATE AND AMPHETAMINE SULFATE 1.25; 1.25; 1.25; 1.25 MG/1; MG/1; MG/1; MG/1
5 TABLET ORAL DAILY
Qty: 30 TABLET | Refills: 0 | Status: SHIPPED | OUTPATIENT
Start: 2023-05-26

## 2023-05-26 RX ORDER — TRAMADOL HYDROCHLORIDE 50 MG/1
50 TABLET ORAL EVERY 6 HOURS PRN
Qty: 100 TABLET | Refills: 0 | Status: SHIPPED | OUTPATIENT
Start: 2023-05-26

## 2023-05-26 NOTE — TELEPHONE ENCOUNTER
Left message detail message per (HIPPA form) with  recommendation.  Patient to call back set up appointment about medication refill

## 2023-06-21 RX ORDER — HYDROCHLOROTHIAZIDE 12.5 MG/1
TABLET ORAL
Qty: 90 TABLET | Refills: 0 | Status: SHIPPED | OUTPATIENT
Start: 2023-06-21

## 2023-06-21 NOTE — TELEPHONE ENCOUNTER
Left message detail message per (HIPPA form) with  recommendation.  Patient to call back and schedule physical and fasting labs

## 2023-07-05 ENCOUNTER — OFFICE VISIT (OUTPATIENT)
Dept: FAMILY MEDICINE CLINIC | Facility: CLINIC | Age: 58
End: 2023-07-05
Payer: COMMERCIAL

## 2023-07-05 VITALS
WEIGHT: 212 LBS | SYSTOLIC BLOOD PRESSURE: 135 MMHG | HEART RATE: 58 BPM | RESPIRATION RATE: 16 BRPM | TEMPERATURE: 98 F | DIASTOLIC BLOOD PRESSURE: 88 MMHG | BODY MASS INDEX: 35 KG/M2 | OXYGEN SATURATION: 96 %

## 2023-07-05 DIAGNOSIS — E03.9 HYPOTHYROIDISM, UNSPECIFIED TYPE: ICD-10-CM

## 2023-07-05 DIAGNOSIS — F98.8 ATTENTION DEFICIT DISORDER, UNSPECIFIED HYPERACTIVITY PRESENCE: ICD-10-CM

## 2023-07-05 DIAGNOSIS — Z12.31 BREAST CANCER SCREENING BY MAMMOGRAM: ICD-10-CM

## 2023-07-05 DIAGNOSIS — Z12.11 COLON CANCER SCREENING: ICD-10-CM

## 2023-07-05 DIAGNOSIS — J45.20 MILD INTERMITTENT REACTIVE AIRWAY DISEASE WITHOUT COMPLICATION: ICD-10-CM

## 2023-07-05 DIAGNOSIS — F32.A DEPRESSION, UNSPECIFIED DEPRESSION TYPE: ICD-10-CM

## 2023-07-05 DIAGNOSIS — Z00.00 WELL ADULT EXAM: Primary | ICD-10-CM

## 2023-07-05 DIAGNOSIS — L70.9 ACNE, UNSPECIFIED ACNE TYPE: ICD-10-CM

## 2023-07-05 DIAGNOSIS — E78.5 HYPERLIPIDEMIA, UNSPECIFIED HYPERLIPIDEMIA TYPE: ICD-10-CM

## 2023-07-05 DIAGNOSIS — E66.9 OBESITY (BMI 30-39.9): ICD-10-CM

## 2023-07-05 DIAGNOSIS — Z72.0 TOBACCO ABUSE: ICD-10-CM

## 2023-07-05 PROBLEM — I10 PRIMARY HYPERTENSION: Status: ACTIVE | Noted: 2023-07-05

## 2023-07-05 LAB
T4 FREE SERPL-MCNC: 1.6 NG/DL (ref 0.8–1.7)
TSI SER-ACNC: 0.01 MIU/ML (ref 0.36–3.74)

## 2023-07-05 PROCEDURE — 84443 ASSAY THYROID STIM HORMONE: CPT | Performed by: FAMILY MEDICINE

## 2023-07-05 PROCEDURE — 3075F SYST BP GE 130 - 139MM HG: CPT | Performed by: FAMILY MEDICINE

## 2023-07-05 PROCEDURE — 84439 ASSAY OF FREE THYROXINE: CPT | Performed by: FAMILY MEDICINE

## 2023-07-05 PROCEDURE — 3079F DIAST BP 80-89 MM HG: CPT | Performed by: FAMILY MEDICINE

## 2023-07-05 PROCEDURE — 99396 PREV VISIT EST AGE 40-64: CPT | Performed by: FAMILY MEDICINE

## 2023-07-05 RX ORDER — TRAMADOL HYDROCHLORIDE 50 MG/1
50 TABLET ORAL EVERY 6 HOURS PRN
Qty: 100 TABLET | Refills: 0 | Status: SHIPPED | OUTPATIENT
Start: 2023-07-05

## 2023-07-05 RX ORDER — CLINDAMYCIN AND BENZOYL PEROXIDE 10; 50 MG/G; MG/G
1 GEL TOPICAL 2 TIMES DAILY
Qty: 150 G | Refills: 3 | Status: SHIPPED | OUTPATIENT
Start: 2023-07-05

## 2023-07-05 RX ORDER — ATORVASTATIN CALCIUM 10 MG/1
10 TABLET, FILM COATED ORAL DAILY
Qty: 90 TABLET | Refills: 0 | Status: SHIPPED | OUTPATIENT
Start: 2023-07-05

## 2023-07-07 ENCOUNTER — HOSPITAL ENCOUNTER (OUTPATIENT)
Dept: MAMMOGRAPHY | Age: 58
Discharge: HOME | End: 2023-07-07
Attending: FAMILY MEDICINE
Payer: COMMERCIAL

## 2023-07-07 DIAGNOSIS — Z00.00 WELL ADULT EXAM: ICD-10-CM

## 2023-07-07 DIAGNOSIS — Z12.31 BREAST CANCER SCREENING BY MAMMOGRAM: ICD-10-CM

## 2023-07-07 PROCEDURE — 77063 BREAST TOMOSYNTHESIS BI: CPT | Performed by: FAMILY MEDICINE

## 2023-07-07 PROCEDURE — 77067 SCR MAMMO BI INCL CAD: CPT | Performed by: FAMILY MEDICINE

## 2023-07-07 RX ORDER — DEXTROAMPHETAMINE SACCHARATE, AMPHETAMINE ASPARTATE MONOHYDRATE, DEXTROAMPHETAMINE SULFATE AND AMPHETAMINE SULFATE 7.5; 7.5; 7.5; 7.5 MG/1; MG/1; MG/1; MG/1
30 CAPSULE, EXTENDED RELEASE ORAL EVERY MORNING
Qty: 30 CAPSULE | Refills: 0 | Status: SHIPPED | OUTPATIENT
Start: 2023-07-07

## 2023-07-07 RX ORDER — ATORVASTATIN CALCIUM 10 MG/1
TABLET, FILM COATED ORAL
Qty: 90 TABLET | Refills: 0 | OUTPATIENT
Start: 2023-07-07

## 2023-07-27 ENCOUNTER — TELEPHONE (OUTPATIENT)
Dept: ORTHOPEDICS CLINIC | Facility: CLINIC | Age: 58
End: 2023-07-27

## 2023-07-27 DIAGNOSIS — M25.551 RIGHT HIP PAIN: Primary | ICD-10-CM

## 2023-07-27 RX ORDER — TRAMADOL HYDROCHLORIDE 50 MG/1
50 TABLET ORAL EVERY 6 HOURS PRN
Qty: 100 TABLET | Refills: 0 | Status: SHIPPED | OUTPATIENT
Start: 2023-07-27

## 2023-07-27 NOTE — TELEPHONE ENCOUNTER
XR ordered per ortho protocol. XR scheduled and patient was notified via telephone call to let them know that they should arrive 15-20 minutes early, in order for them to complete imaging.

## 2023-07-31 RX ORDER — LOSARTAN POTASSIUM 25 MG/1
25 TABLET ORAL DAILY
Qty: 90 TABLET | Refills: 3 | Status: SHIPPED | OUTPATIENT
Start: 2023-07-31

## 2023-07-31 RX ORDER — BUPROPION HYDROCHLORIDE 150 MG/1
150 TABLET ORAL EVERY MORNING
Qty: 90 TABLET | Refills: 3 | Status: SHIPPED | OUTPATIENT
Start: 2023-07-31

## 2023-08-01 ENCOUNTER — HOSPITAL ENCOUNTER (OUTPATIENT)
Dept: GENERAL RADIOLOGY | Age: 58
Discharge: HOME | End: 2023-08-01
Attending: ORTHOPAEDIC SURGERY
Payer: COMMERCIAL

## 2023-08-01 ENCOUNTER — OFFICE VISIT (OUTPATIENT)
Dept: ORTHOPEDICS CLINIC | Facility: CLINIC | Age: 58
End: 2023-08-01
Payer: COMMERCIAL

## 2023-08-01 VITALS — WEIGHT: 210 LBS | BODY MASS INDEX: 34.57 KG/M2 | HEIGHT: 65.5 IN

## 2023-08-01 DIAGNOSIS — M16.11 PRIMARY OSTEOARTHRITIS OF RIGHT HIP: Primary | ICD-10-CM

## 2023-08-01 DIAGNOSIS — Z96.642 STATUS POST TOTAL REPLACEMENT OF LEFT HIP: ICD-10-CM

## 2023-08-01 DIAGNOSIS — M25.551 RIGHT HIP PAIN: ICD-10-CM

## 2023-08-01 PROCEDURE — 99213 OFFICE O/P EST LOW 20 MIN: CPT | Performed by: ORTHOPAEDIC SURGERY

## 2023-08-01 PROCEDURE — 3008F BODY MASS INDEX DOCD: CPT | Performed by: ORTHOPAEDIC SURGERY

## 2023-08-01 PROCEDURE — 73502 X-RAY EXAM HIP UNI 2-3 VIEWS: CPT | Performed by: ORTHOPAEDIC SURGERY

## 2023-08-01 RX ORDER — HYDROCODONE BITARTRATE AND ACETAMINOPHEN 5; 325 MG/1; MG/1
1 TABLET ORAL NIGHTLY PRN
Qty: 40 TABLET | Refills: 0 | Status: SHIPPED | OUTPATIENT
Start: 2023-08-01

## 2023-08-01 NOTE — PROGRESS NOTES
Patient is a 49-year-old white female here for follow-up on her left hip and also complaining of right hip pain. Patient has had a left total hip arthroplasty performed in the past by myself and that is functioning well for her. She has been having increased pain though of her right hip over the last year or so. Pain is located in her groin. No specific trauma noted. Patient's exam shows have a mildly antalgic gait. She has pain with passive range of motion of her right hip. Patient has a mild Trendelenburg gait on the right. No pain with passive range of motion of her left hip. Negative Trendelenburg gait on the left. X-rays of her right hip shows the degenerative arthritis with joint space narrowing. Complete joint space narrowing superiorly. Mild osteophytes present. Left hip is in good position. No evidence of loosening. Impression is that of degenerative arthritis of the right hip. Second impression is that of stable left total hip arthroplasty. Presently the patient be open to going ahead with a cortisone injection. I advised having her see one of the physiatrist's or radiologist's for an injection. She was referred to 1 of these doctors. Patient also somewhere the fact that she might need a right total hip arthroplasty performed in the not too distant future. She was advised to see one of my partners if her pain persists or progresses and she wants to go that route. Patient has fairly significant pain at times at night when she sleeping and requested a pain medication to manage that. I did give her a prescription for Norco 325 mg, 1 tablet at night as needed pain. Patient is aware that she needs to minimize the use of this.

## 2023-08-09 RX ORDER — DEXTROAMPHETAMINE SACCHARATE, AMPHETAMINE ASPARTATE MONOHYDRATE, DEXTROAMPHETAMINE SULFATE AND AMPHETAMINE SULFATE 7.5; 7.5; 7.5; 7.5 MG/1; MG/1; MG/1; MG/1
30 CAPSULE, EXTENDED RELEASE ORAL EVERY MORNING
Qty: 30 CAPSULE | Refills: 0 | Status: SHIPPED | OUTPATIENT
Start: 2023-08-09

## 2023-08-09 RX ORDER — DEXTROAMPHETAMINE SACCHARATE, AMPHETAMINE ASPARTATE, DEXTROAMPHETAMINE SULFATE AND AMPHETAMINE SULFATE 1.25; 1.25; 1.25; 1.25 MG/1; MG/1; MG/1; MG/1
5 TABLET ORAL DAILY
Qty: 30 TABLET | Refills: 0 | Status: SHIPPED | OUTPATIENT
Start: 2023-08-09

## 2023-08-11 ENCOUNTER — OFFICE VISIT (OUTPATIENT)
Dept: PAIN CLINIC | Facility: CLINIC | Age: 58
End: 2023-08-11
Payer: COMMERCIAL

## 2023-08-11 VITALS
WEIGHT: 210 LBS | BODY MASS INDEX: 34 KG/M2 | HEART RATE: 82 BPM | OXYGEN SATURATION: 98 % | DIASTOLIC BLOOD PRESSURE: 90 MMHG | SYSTOLIC BLOOD PRESSURE: 136 MMHG

## 2023-08-11 DIAGNOSIS — M25.551 RIGHT HIP PAIN: ICD-10-CM

## 2023-08-11 DIAGNOSIS — M16.10 HIP ARTHRITIS: Primary | ICD-10-CM

## 2023-08-11 PROCEDURE — 3080F DIAST BP >= 90 MM HG: CPT | Performed by: PHYSICIAN ASSISTANT

## 2023-08-11 PROCEDURE — 3075F SYST BP GE 130 - 139MM HG: CPT | Performed by: PHYSICIAN ASSISTANT

## 2023-08-11 PROCEDURE — 99203 OFFICE O/P NEW LOW 30 MIN: CPT | Performed by: PHYSICIAN ASSISTANT

## 2023-08-14 ENCOUNTER — TELEPHONE (OUTPATIENT)
Dept: PAIN CLINIC | Facility: CLINIC | Age: 58
End: 2023-08-14

## 2023-08-14 NOTE — TELEPHONE ENCOUNTER
Called [facility]  spoke w/Queen DARELL [id number] for cpt code [id number] stated no Tony Aron was required but pre D was required fax pre d w/clinical to [phone]  refer # Mavis LOZOYA 8/14/23

## 2023-09-13 RX ORDER — DEXTROAMPHETAMINE SACCHARATE, AMPHETAMINE ASPARTATE, DEXTROAMPHETAMINE SULFATE AND AMPHETAMINE SULFATE 1.25; 1.25; 1.25; 1.25 MG/1; MG/1; MG/1; MG/1
5 TABLET ORAL DAILY
Qty: 30 TABLET | Refills: 0 | Status: SHIPPED | OUTPATIENT
Start: 2023-09-13

## 2023-09-13 RX ORDER — DEXTROAMPHETAMINE SACCHARATE, AMPHETAMINE ASPARTATE MONOHYDRATE, DEXTROAMPHETAMINE SULFATE AND AMPHETAMINE SULFATE 7.5; 7.5; 7.5; 7.5 MG/1; MG/1; MG/1; MG/1
30 CAPSULE, EXTENDED RELEASE ORAL EVERY MORNING
Qty: 30 CAPSULE | Refills: 0 | Status: SHIPPED | OUTPATIENT
Start: 2023-09-13

## 2023-09-19 RX ORDER — HYDROCHLOROTHIAZIDE 12.5 MG/1
12.5 TABLET ORAL DAILY
Qty: 90 TABLET | Refills: 3 | Status: SHIPPED | OUTPATIENT
Start: 2023-09-19

## 2023-09-19 RX ORDER — ATORVASTATIN CALCIUM 10 MG/1
10 TABLET, FILM COATED ORAL DAILY
Qty: 90 TABLET | Refills: 2 | Status: SHIPPED | OUTPATIENT
Start: 2023-09-19

## 2023-09-25 RX ORDER — TRAMADOL HYDROCHLORIDE 50 MG/1
50 TABLET ORAL EVERY 6 HOURS PRN
Qty: 100 TABLET | Refills: 0 | Status: SHIPPED | OUTPATIENT
Start: 2023-09-25

## 2023-09-25 RX ORDER — CITALOPRAM 40 MG/1
40 TABLET ORAL DAILY
Qty: 90 TABLET | Refills: 3 | Status: SHIPPED | OUTPATIENT
Start: 2023-09-25

## 2023-10-12 RX ORDER — DEXTROAMPHETAMINE SACCHARATE, AMPHETAMINE ASPARTATE, DEXTROAMPHETAMINE SULFATE AND AMPHETAMINE SULFATE 1.25; 1.25; 1.25; 1.25 MG/1; MG/1; MG/1; MG/1
5 TABLET ORAL DAILY
Qty: 30 TABLET | Refills: 0 | Status: SHIPPED | OUTPATIENT
Start: 2023-10-12

## 2023-10-12 RX ORDER — DEXTROAMPHETAMINE SACCHARATE, AMPHETAMINE ASPARTATE MONOHYDRATE, DEXTROAMPHETAMINE SULFATE AND AMPHETAMINE SULFATE 7.5; 7.5; 7.5; 7.5 MG/1; MG/1; MG/1; MG/1
30 CAPSULE, EXTENDED RELEASE ORAL EVERY MORNING
Qty: 30 CAPSULE | Refills: 0 | Status: SHIPPED | OUTPATIENT
Start: 2023-10-12

## 2023-10-12 RX ORDER — TRAMADOL HYDROCHLORIDE 50 MG/1
50 TABLET ORAL EVERY 6 HOURS PRN
Qty: 100 TABLET | Refills: 0 | Status: SHIPPED | OUTPATIENT
Start: 2023-10-12

## 2023-10-20 RX ORDER — LEVOTHYROXINE SODIUM 0.2 MG/1
200 TABLET ORAL DAILY
Qty: 90 TABLET | Refills: 3 | Status: SHIPPED | OUTPATIENT
Start: 2023-10-20

## 2023-11-13 RX ORDER — LEVOTHYROXINE SODIUM 0.2 MG/1
200 TABLET ORAL DAILY
Qty: 90 TABLET | Refills: 3 | Status: SHIPPED | OUTPATIENT
Start: 2023-11-13

## 2023-11-13 RX ORDER — DEXTROAMPHETAMINE SACCHARATE, AMPHETAMINE ASPARTATE MONOHYDRATE, DEXTROAMPHETAMINE SULFATE AND AMPHETAMINE SULFATE 7.5; 7.5; 7.5; 7.5 MG/1; MG/1; MG/1; MG/1
30 CAPSULE, EXTENDED RELEASE ORAL EVERY MORNING
Qty: 30 CAPSULE | Refills: 0 | Status: SHIPPED | OUTPATIENT
Start: 2023-11-13

## 2023-11-13 RX ORDER — TRAMADOL HYDROCHLORIDE 50 MG/1
50 TABLET ORAL EVERY 6 HOURS PRN
Qty: 100 TABLET | Refills: 0 | Status: SHIPPED | OUTPATIENT
Start: 2023-11-13

## 2023-11-13 RX ORDER — DEXTROAMPHETAMINE SACCHARATE, AMPHETAMINE ASPARTATE, DEXTROAMPHETAMINE SULFATE AND AMPHETAMINE SULFATE 1.25; 1.25; 1.25; 1.25 MG/1; MG/1; MG/1; MG/1
5 TABLET ORAL DAILY
Qty: 30 TABLET | Refills: 0 | Status: SHIPPED | OUTPATIENT
Start: 2023-11-13

## 2023-12-14 RX ORDER — DEXTROAMPHETAMINE SACCHARATE, AMPHETAMINE ASPARTATE MONOHYDRATE, DEXTROAMPHETAMINE SULFATE AND AMPHETAMINE SULFATE 7.5; 7.5; 7.5; 7.5 MG/1; MG/1; MG/1; MG/1
30 CAPSULE, EXTENDED RELEASE ORAL EVERY MORNING
Qty: 30 CAPSULE | Refills: 0 | Status: SHIPPED | OUTPATIENT
Start: 2023-12-14

## 2023-12-14 RX ORDER — DEXTROAMPHETAMINE SACCHARATE, AMPHETAMINE ASPARTATE, DEXTROAMPHETAMINE SULFATE AND AMPHETAMINE SULFATE 1.25; 1.25; 1.25; 1.25 MG/1; MG/1; MG/1; MG/1
5 TABLET ORAL DAILY
Qty: 30 TABLET | Refills: 0 | Status: SHIPPED | OUTPATIENT
Start: 2023-12-14

## 2023-12-14 RX ORDER — TRAMADOL HYDROCHLORIDE 50 MG/1
50 TABLET ORAL EVERY 6 HOURS PRN
Qty: 100 TABLET | Refills: 0 | Status: SHIPPED | OUTPATIENT
Start: 2023-12-14

## 2023-12-15 ENCOUNTER — OFFICE VISIT (OUTPATIENT)
Dept: PAIN CLINIC | Facility: CLINIC | Age: 58
End: 2023-12-15
Payer: COMMERCIAL

## 2023-12-15 DIAGNOSIS — M16.9 OSTEOARTHRITIS OF HIP, UNSPECIFIED LATERALITY, UNSPECIFIED OSTEOARTHRITIS TYPE: Primary | ICD-10-CM

## 2023-12-15 RX ORDER — LIDOCAINE HYDROCHLORIDE 10 MG/ML
10 INJECTION, SOLUTION INFILTRATION; PERINEURAL ONCE
Status: CANCELLED | OUTPATIENT
Start: 2023-12-15 | End: 2023-12-15

## 2023-12-15 RX ORDER — TRIAMCINOLONE ACETONIDE 40 MG/ML
40 INJECTION, SUSPENSION INTRA-ARTICULAR; INTRAMUSCULAR ONCE
Status: CANCELLED | OUTPATIENT
Start: 2023-12-15 | End: 2023-12-15

## 2023-12-15 RX ORDER — BUPIVACAINE HYDROCHLORIDE 5 MG/ML
5 INJECTION, SOLUTION EPIDURAL; INTRACAUDAL ONCE
Status: CANCELLED | OUTPATIENT
Start: 2023-12-15 | End: 2023-12-15

## 2024-01-01 RX ORDER — ALBUTEROL SULFATE 90 UG/1
2 AEROSOL, METERED RESPIRATORY (INHALATION) EVERY 4 HOURS PRN
Qty: 25.5 G | Refills: 3 | Status: SHIPPED | OUTPATIENT
Start: 2024-01-01

## 2024-01-01 NOTE — TELEPHONE ENCOUNTER
Please let patient or caregiver know or leave message that refill request for the medication/s listed below has/have come to our office. The refills have been sent.  It appears she is due for pap smear and colon cancer screen  For the pap she could see [facility]  P:[phone]   For colon cancer screening I believe she has seen Dr. Suárez in the past for this and can see him again. Dr. Suárez, p# [phone]     Thanks            Requested Prescriptions     Signed Prescriptions Disp Refills    ALBUTEROL 108 (90 Base) MCG/ACT Inhalation Aero Soln 25.5 g 3     Sig: USE 2 INHALATIONS EVERY 4 HOURS AS NEEDED FOR WHEEZING     Authorizing Provider: MAXIMUS DAVIS

## 2024-01-08 ENCOUNTER — NURSE ONLY (OUTPATIENT)
Dept: PAIN CLINIC | Facility: CLINIC | Age: 59
End: 2024-01-08
Payer: COMMERCIAL

## 2024-01-08 ENCOUNTER — OFFICE VISIT (OUTPATIENT)
Dept: PAIN CLINIC | Facility: CLINIC | Age: 59
End: 2024-01-08
Payer: COMMERCIAL

## 2024-01-08 VITALS — DIASTOLIC BLOOD PRESSURE: 80 MMHG | SYSTOLIC BLOOD PRESSURE: 140 MMHG | HEART RATE: 89 BPM | OXYGEN SATURATION: 99 %

## 2024-01-08 DIAGNOSIS — M16.9 OSTEOARTHRITIS OF HIP, UNSPECIFIED LATERALITY, UNSPECIFIED OSTEOARTHRITIS TYPE: Primary | ICD-10-CM

## 2024-01-08 DIAGNOSIS — M25.551 RIGHT HIP PAIN: ICD-10-CM

## 2024-01-08 RX ORDER — LIDOCAINE HYDROCHLORIDE 10 MG/ML
10 INJECTION, SOLUTION INFILTRATION; PERINEURAL ONCE
Status: COMPLETED | OUTPATIENT
Start: 2024-01-08 | End: 2024-01-08

## 2024-01-08 RX ORDER — BUPIVACAINE HYDROCHLORIDE 5 MG/ML
5 INJECTION, SOLUTION EPIDURAL; INTRACAUDAL ONCE
Status: COMPLETED | OUTPATIENT
Start: 2024-01-08 | End: 2024-01-08

## 2024-01-08 RX ORDER — TRIAMCINOLONE ACETONIDE 40 MG/ML
40 INJECTION, SUSPENSION INTRA-ARTICULAR; INTRAMUSCULAR ONCE
Status: COMPLETED | OUTPATIENT
Start: 2024-01-08 | End: 2024-01-08

## 2024-01-08 NOTE — PROCEDURES
[facility]  Operative Report  2024     Carolina Allison Patient Status:  No patient class for patient encounter    1965 MRN [id number]   Location [facility] Attending No att. providers found   [facility] Day # 0 PCP Cleve Herrera MD     Indication: Carolina is a 58 year old female with hip pain    Preoperative Diagnosis: Osteoarthritis of right hip  Postoperative Diagnosis: Same as above.    Procedure performed: Ultrasound-guided intra-articular hip injection, right    Anesthesia: Local      EBL: Less than 1 ml.    Procedure Description:  After reviewing the patient’s history and performing a focused physical examination, the diagnosis was confirmed and contraindications such as infection and coagulopathy were ruled out.  Following review of potential side effects and complications, including but not necessarily limited to infection, allergic reaction, local tissue breakdown, nerve injury, and paresis, the patient indicated they understood and agreed to proceed.       The patient was brought to the procedure room and placed in supine position. After prepping and draping, the hip joint was identified with the help of fluoroscopy sterile ultrasound.  A 21-gauge Stimuplex needle was used to enter the joint after local infiltration with lidocaine.   After that, 40 mg Depo-Medrol mixed with 4 cc 1% Lidocaine was injected. The patient tolerated the procedure very well. The patient had complete understanding of the risks and benefits of the procedure.  All relevant imaging saved to the PACS system.    Complications: None.    Follow up: Clinic    Fabian Patterson MD

## 2024-01-08 NOTE — PROGRESS NOTES
Timeout completed prior to procedure @ 3612.  Participants present for timeout:  Dr. Patterson, JENNIFER Coker, and patient.

## 2024-01-08 NOTE — PROGRESS NOTES
Patient presents in office today with reported pain in right hip    Current pain level reported = 2/10    Last reported dose of n/a      Narcotic Contract renewal n/a    Urine Drug screen n/a

## 2024-01-08 NOTE — PROGRESS NOTES
Patient presents in office today with reported pain in ***    Current pain level reported = ***/10    Last reported dose of n/a      Narcotic Contract renewal n/a    Urine Drug screen n/a

## 2024-01-08 NOTE — PATIENT INSTRUCTIONS
Refill policies:    Allow 2-3 business days for refills; controlled substances may take longer.  Contact your pharmacy at least 5 days prior to running out of medication and have them send an electronic request or submit request through the “request refill” option in your [employer] account.  Refills are not addressed on weekends; covering physicians do not authorize routine medications on weekends.  No narcotics or controlled substances are refilled after noon on Fridays or by on call physicians.  By law, narcotics must be electronically prescribed.  A 30 day supply with no refills is the maximum allowed.  If your prescription is due for a refill, you may be due for a follow up appointment.  To best provide you care, patients receiving routine medications need to be seen at least once a year.  Patients receiving narcotic/controlled substance medications need to be seen at least once every 3 months.  In the event that your preferred pharmacy does not have the requested medication in stock (e.g. Backordered), it is your responsibility to find another pharmacy that has the requested medication available.  We will gladly send a new prescription to that pharmacy at your request.    Scheduling Tests:    If your physician has ordered radiology tests such as MRI or CT scans, please contact Central Scheduling at [phone] right away to schedule the test.  Once scheduled, the [facility] Centralized Referral Team will work with your insurance carrier to obtain pre-certification or prior authorization.  Depending on your insurance carrier, approval may take 3-10 days.  It is highly recommended patients assure they have received an authorization before having a test performed.  If test is done without insurance authorization, patient may be responsible for the entire amount billed.      Precertification and Prior Authorizations:  If your physician has recommended that you have a procedure or additional testing performed the [facility]  Centralized Referral Team will contact your insurance carrier to obtain pre-certification or prior authorization.    You are strongly encouraged to contact your insurance carrier to verify that your procedure/test has been approved and is a COVERED benefit.  Although the [facility] Centralized Referral Team does its due diligence, the insurance carrier gives the disclaimer that \"Although the procedure is authorized, this does not guarantee payment.\"    Ultimately the patient is responsible for payment.   Thank you for your understanding in this matter.  Paperwork Completion:  If you require FMLA or disability paperwork for your recovery, please make sure to either drop it off or have it faxed to our office at [phone]. Be sure the form has your name and date of birth on it.  The form will be faxed to our Forms Department and they will complete it for you.  There is a 25$ fee for all forms that need to be filled out.  Please be aware there is a 10-14 day turnaround time.  You will need to sign a release of information (SALVADOR) form if your paperwork does not come with one.  You may call the Forms Department with any questions at [phone].  Their fax number is [phone].

## 2024-02-22 DIAGNOSIS — M54.50 CHRONIC MIDLINE LOW BACK PAIN WITHOUT SCIATICA: ICD-10-CM

## 2024-02-22 DIAGNOSIS — M16.9 OSTEOARTHRITIS OF HIP, UNSPECIFIED LATERALITY, UNSPECIFIED OSTEOARTHRITIS TYPE: ICD-10-CM

## 2024-02-22 DIAGNOSIS — G89.29 CHRONIC MIDLINE LOW BACK PAIN WITHOUT SCIATICA: ICD-10-CM

## 2024-02-22 DIAGNOSIS — F98.8 ATTENTION DEFICIT DISORDER, UNSPECIFIED HYPERACTIVITY PRESENCE: ICD-10-CM

## 2024-02-22 RX ORDER — DEXTROAMPHETAMINE SACCHARATE, AMPHETAMINE ASPARTATE MONOHYDRATE, DEXTROAMPHETAMINE SULFATE AND AMPHETAMINE SULFATE 7.5; 7.5; 7.5; 7.5 MG/1; MG/1; MG/1; MG/1
30 CAPSULE, EXTENDED RELEASE ORAL EVERY MORNING
Qty: 30 CAPSULE | Refills: 0 | Status: SHIPPED | OUTPATIENT
Start: 2024-02-22

## 2024-02-22 RX ORDER — TRAMADOL HYDROCHLORIDE 50 MG/1
50 TABLET ORAL EVERY 6 HOURS PRN
Qty: 100 TABLET | Refills: 0 | Status: CANCELLED | OUTPATIENT
Start: 2024-02-22

## 2024-02-22 RX ORDER — TRAMADOL HYDROCHLORIDE 50 MG/1
50 TABLET ORAL EVERY 6 HOURS PRN
Qty: 100 TABLET | Refills: 0 | Status: SHIPPED | OUTPATIENT
Start: 2024-02-22

## 2024-02-22 RX ORDER — DEXTROAMPHETAMINE SACCHARATE, AMPHETAMINE ASPARTATE, DEXTROAMPHETAMINE SULFATE AND AMPHETAMINE SULFATE 1.25; 1.25; 1.25; 1.25 MG/1; MG/1; MG/1; MG/1
5 TABLET ORAL DAILY
Qty: 30 TABLET | Refills: 0 | Status: SHIPPED | OUTPATIENT
Start: 2024-02-22

## 2024-03-20 RX ORDER — LEVOTHYROXINE SODIUM 0.05 MG/1
50 TABLET ORAL
Qty: 90 TABLET | Refills: 1 | Status: SHIPPED | OUTPATIENT
Start: 2024-03-20

## 2024-03-24 ENCOUNTER — WALK IN (OUTPATIENT)
Dept: URGENT CARE | Age: 59
End: 2024-03-24
Attending: FAMILY MEDICINE

## 2024-03-24 VITALS
SYSTOLIC BLOOD PRESSURE: 165 MMHG | TEMPERATURE: 98.6 F | DIASTOLIC BLOOD PRESSURE: 91 MMHG | OXYGEN SATURATION: 99 % | RESPIRATION RATE: 16 BRPM | WEIGHT: 190 LBS | HEART RATE: 92 BPM

## 2024-03-24 DIAGNOSIS — J01.00 ACUTE NON-RECURRENT MAXILLARY SINUSITIS: Primary | ICD-10-CM

## 2024-03-24 DIAGNOSIS — R05.1 ACUTE COUGH: ICD-10-CM

## 2024-03-24 PROBLEM — F32.A DEPRESSION: Status: ACTIVE | Noted: 2024-03-24

## 2024-03-24 PROBLEM — F98.8 ADD (ATTENTION DEFICIT DISORDER): Status: ACTIVE | Noted: 2024-03-24

## 2024-03-24 PROBLEM — I10 PRIMARY HYPERTENSION: Status: ACTIVE | Noted: 2023-07-05

## 2024-03-24 PROBLEM — E78.5 HYPERLIPIDEMIA: Status: ACTIVE | Noted: 2024-03-24

## 2024-03-24 PROBLEM — M16.9 OSTEOARTHRITIS OF HIP: Status: ACTIVE | Noted: 2024-03-24

## 2024-03-24 PROBLEM — E03.9 HYPOTHYROIDISM: Status: ACTIVE | Noted: 2024-03-24

## 2024-03-24 PROCEDURE — 99202 OFFICE O/P NEW SF 15 MIN: CPT

## 2024-03-24 RX ORDER — HYDROCHLOROTHIAZIDE 12.5 MG/1
TABLET ORAL
COMMUNITY
Start: 2024-03-17

## 2024-03-24 RX ORDER — LEVOTHYROXINE SODIUM 0.2 MG/1
200 TABLET ORAL
COMMUNITY
Start: 2023-11-13

## 2024-03-24 RX ORDER — LOSARTAN POTASSIUM 25 MG/1
TABLET ORAL
COMMUNITY
Start: 2024-01-26

## 2024-03-24 RX ORDER — AMOXICILLIN AND CLAVULANATE POTASSIUM 875; 125 MG/1; MG/1
1 TABLET, FILM COATED ORAL 2 TIMES DAILY
Qty: 14 TABLET | Refills: 0 | Status: SHIPPED | OUTPATIENT
Start: 2024-03-24 | End: 2024-03-31

## 2024-03-24 RX ORDER — ALBUTEROL SULFATE 90 UG/1
2 AEROSOL, METERED RESPIRATORY (INHALATION) EVERY 4 HOURS PRN
COMMUNITY

## 2024-03-24 RX ORDER — BENZONATATE 200 MG/1
200 CAPSULE ORAL 3 TIMES DAILY PRN
Qty: 20 CAPSULE | Refills: 0 | Status: SHIPPED | OUTPATIENT
Start: 2024-03-24

## 2024-03-24 RX ORDER — ATORVASTATIN CALCIUM 10 MG/1
TABLET, FILM COATED ORAL
COMMUNITY
Start: 2024-03-08

## 2024-03-24 RX ORDER — FLUTICASONE PROPIONATE 50 MCG
1 SPRAY, SUSPENSION (ML) NASAL 2 TIMES DAILY
Qty: 16 G | Refills: 0 | Status: SHIPPED | OUTPATIENT
Start: 2024-03-24

## 2024-03-26 RX ORDER — ALBUTEROL SULFATE 90 UG/1
2 AEROSOL, METERED RESPIRATORY (INHALATION) EVERY 4 HOURS PRN
Qty: 25.5 G | Refills: 3 | Status: SHIPPED | OUTPATIENT
Start: 2024-03-26

## 2024-04-11 DIAGNOSIS — F98.8 ATTENTION DEFICIT DISORDER, UNSPECIFIED HYPERACTIVITY PRESENCE: ICD-10-CM

## 2024-04-11 DIAGNOSIS — M54.50 CHRONIC MIDLINE LOW BACK PAIN WITHOUT SCIATICA: ICD-10-CM

## 2024-04-11 DIAGNOSIS — M16.9 OSTEOARTHRITIS OF HIP, UNSPECIFIED LATERALITY, UNSPECIFIED OSTEOARTHRITIS TYPE: ICD-10-CM

## 2024-04-11 DIAGNOSIS — G89.29 CHRONIC MIDLINE LOW BACK PAIN WITHOUT SCIATICA: ICD-10-CM

## 2024-04-11 RX ORDER — DEXTROAMPHETAMINE SACCHARATE, AMPHETAMINE ASPARTATE, DEXTROAMPHETAMINE SULFATE AND AMPHETAMINE SULFATE 1.25; 1.25; 1.25; 1.25 MG/1; MG/1; MG/1; MG/1
5 TABLET ORAL DAILY
Qty: 30 TABLET | Refills: 0 | Status: SHIPPED | OUTPATIENT
Start: 2024-04-11

## 2024-04-11 RX ORDER — TRAMADOL HYDROCHLORIDE 50 MG/1
50 TABLET ORAL EVERY 6 HOURS PRN
Qty: 100 TABLET | Refills: 0 | Status: SHIPPED | OUTPATIENT
Start: 2024-04-11

## 2024-04-11 RX ORDER — DEXTROAMPHETAMINE SACCHARATE, AMPHETAMINE ASPARTATE MONOHYDRATE, DEXTROAMPHETAMINE SULFATE AND AMPHETAMINE SULFATE 7.5; 7.5; 7.5; 7.5 MG/1; MG/1; MG/1; MG/1
30 CAPSULE, EXTENDED RELEASE ORAL EVERY MORNING
Qty: 30 CAPSULE | Refills: 0 | Status: SHIPPED | OUTPATIENT
Start: 2024-04-11

## 2024-05-08 RX ORDER — ALBUTEROL SULFATE 90 UG/1
2 AEROSOL, METERED RESPIRATORY (INHALATION) EVERY 4 HOURS PRN
Qty: 25.5 G | Refills: 3 | Status: SHIPPED | OUTPATIENT
Start: 2024-05-08

## 2024-05-18 DIAGNOSIS — F98.8 ATTENTION DEFICIT DISORDER, UNSPECIFIED HYPERACTIVITY PRESENCE: ICD-10-CM

## 2024-05-20 RX ORDER — DEXTROAMPHETAMINE SACCHARATE, AMPHETAMINE ASPARTATE, DEXTROAMPHETAMINE SULFATE AND AMPHETAMINE SULFATE 1.25; 1.25; 1.25; 1.25 MG/1; MG/1; MG/1; MG/1
5 TABLET ORAL DAILY
Qty: 30 TABLET | Refills: 0 | Status: SHIPPED | OUTPATIENT
Start: 2024-05-20

## 2024-05-20 RX ORDER — DEXTROAMPHETAMINE SACCHARATE, AMPHETAMINE ASPARTATE MONOHYDRATE, DEXTROAMPHETAMINE SULFATE AND AMPHETAMINE SULFATE 7.5; 7.5; 7.5; 7.5 MG/1; MG/1; MG/1; MG/1
30 CAPSULE, EXTENDED RELEASE ORAL EVERY MORNING
Qty: 30 CAPSULE | Refills: 0 | Status: SHIPPED | OUTPATIENT
Start: 2024-05-20

## 2024-05-23 DIAGNOSIS — M54.50 CHRONIC MIDLINE LOW BACK PAIN WITHOUT SCIATICA: ICD-10-CM

## 2024-05-23 DIAGNOSIS — G89.29 CHRONIC MIDLINE LOW BACK PAIN WITHOUT SCIATICA: ICD-10-CM

## 2024-05-23 DIAGNOSIS — M16.9 OSTEOARTHRITIS OF HIP, UNSPECIFIED LATERALITY, UNSPECIFIED OSTEOARTHRITIS TYPE: ICD-10-CM

## 2024-05-23 RX ORDER — TRAMADOL HYDROCHLORIDE 50 MG/1
50 TABLET ORAL EVERY 6 HOURS PRN
Qty: 100 TABLET | Refills: 0 | Status: SHIPPED | OUTPATIENT
Start: 2024-05-23

## 2024-06-03 RX ORDER — ALBUTEROL SULFATE 90 UG/1
2 AEROSOL, METERED RESPIRATORY (INHALATION) EVERY 4 HOURS PRN
Qty: 25.5 G | Refills: 0 | Status: SHIPPED | OUTPATIENT
Start: 2024-06-03

## 2024-06-06 RX ORDER — ATORVASTATIN CALCIUM 10 MG/1
10 TABLET, FILM COATED ORAL DAILY
Qty: 90 TABLET | Refills: 1 | Status: SHIPPED | OUTPATIENT
Start: 2024-06-06

## 2024-06-20 DIAGNOSIS — F98.8 ATTENTION DEFICIT DISORDER, UNSPECIFIED HYPERACTIVITY PRESENCE: ICD-10-CM

## 2024-06-20 DIAGNOSIS — M54.50 CHRONIC MIDLINE LOW BACK PAIN WITHOUT SCIATICA: ICD-10-CM

## 2024-06-20 DIAGNOSIS — G89.29 CHRONIC MIDLINE LOW BACK PAIN WITHOUT SCIATICA: ICD-10-CM

## 2024-06-20 DIAGNOSIS — M16.9 OSTEOARTHRITIS OF HIP, UNSPECIFIED LATERALITY, UNSPECIFIED OSTEOARTHRITIS TYPE: ICD-10-CM

## 2024-06-20 RX ORDER — DEXTROAMPHETAMINE SACCHARATE, AMPHETAMINE ASPARTATE MONOHYDRATE, DEXTROAMPHETAMINE SULFATE AND AMPHETAMINE SULFATE 7.5; 7.5; 7.5; 7.5 MG/1; MG/1; MG/1; MG/1
30 CAPSULE, EXTENDED RELEASE ORAL EVERY MORNING
Qty: 30 CAPSULE | Refills: 0 | Status: SHIPPED | OUTPATIENT
Start: 2024-06-20

## 2024-06-20 RX ORDER — DEXTROAMPHETAMINE SACCHARATE, AMPHETAMINE ASPARTATE, DEXTROAMPHETAMINE SULFATE AND AMPHETAMINE SULFATE 1.25; 1.25; 1.25; 1.25 MG/1; MG/1; MG/1; MG/1
5 TABLET ORAL DAILY
Qty: 30 TABLET | Refills: 0 | Status: SHIPPED | OUTPATIENT
Start: 2024-06-20

## 2024-06-20 RX ORDER — TRAMADOL HYDROCHLORIDE 50 MG/1
50 TABLET ORAL EVERY 6 HOURS PRN
Qty: 100 TABLET | Refills: 0 | Status: SHIPPED | OUTPATIENT
Start: 2024-06-20

## 2024-07-22 DIAGNOSIS — M54.50 CHRONIC MIDLINE LOW BACK PAIN WITHOUT SCIATICA: ICD-10-CM

## 2024-07-22 DIAGNOSIS — M16.9 OSTEOARTHRITIS OF HIP, UNSPECIFIED LATERALITY, UNSPECIFIED OSTEOARTHRITIS TYPE: ICD-10-CM

## 2024-07-22 DIAGNOSIS — F98.8 ATTENTION DEFICIT DISORDER, UNSPECIFIED HYPERACTIVITY PRESENCE: ICD-10-CM

## 2024-07-22 DIAGNOSIS — G89.29 CHRONIC MIDLINE LOW BACK PAIN WITHOUT SCIATICA: ICD-10-CM

## 2024-07-23 RX ORDER — DEXTROAMPHETAMINE SACCHARATE, AMPHETAMINE ASPARTATE, DEXTROAMPHETAMINE SULFATE AND AMPHETAMINE SULFATE 1.25; 1.25; 1.25; 1.25 MG/1; MG/1; MG/1; MG/1
5 TABLET ORAL DAILY
Qty: 30 TABLET | Refills: 0 | Status: SHIPPED | OUTPATIENT
Start: 2024-07-23

## 2024-07-23 RX ORDER — TRAMADOL HYDROCHLORIDE 50 MG/1
50 TABLET ORAL EVERY 6 HOURS PRN
Qty: 100 TABLET | Refills: 0 | Status: SHIPPED | OUTPATIENT
Start: 2024-07-23

## 2024-07-23 RX ORDER — DEXTROAMPHETAMINE SACCHARATE, AMPHETAMINE ASPARTATE MONOHYDRATE, DEXTROAMPHETAMINE SULFATE AND AMPHETAMINE SULFATE 7.5; 7.5; 7.5; 7.5 MG/1; MG/1; MG/1; MG/1
30 CAPSULE, EXTENDED RELEASE ORAL EVERY MORNING
Qty: 30 CAPSULE | Refills: 0 | Status: SHIPPED | OUTPATIENT
Start: 2024-07-23

## 2024-07-23 NOTE — TELEPHONE ENCOUNTER
Please let patient or caregiver know or leave message that refill request for the medication/s listed below has/have come to our office. The refills have been sent.    She is due for the 6 month follow up on her ADD meds. Please help schedule a VV in the next week or so. Mondays are preferable.    Thanks     Requested Prescriptions     Signed Prescriptions Disp Refills    amphetamine-dextroamphetamine (ADDERALL) 5 MG Oral Tab 30 tablet 0     Sig: Take 1 tablet (5 mg total) by mouth daily.     Authorizing Provider: MAXIMUS DAVIS    amphetamine-dextroamphetamine ER (ADDERALL XR) 30 MG Oral Capsule SR 24 Hr 30 capsule 0     Sig: Take 1 capsule (30 mg total) by mouth every morning.     Authorizing Provider: MAXIMUS DAVIS    traMADol 50 MG Oral Tab 100 tablet 0     Sig: Take 1 tablet (50 mg total) by mouth every 6 (six) hours as needed for Pain.     Authorizing Provider: MAXIMUS DAVIS

## 2024-07-24 RX ORDER — LOSARTAN POTASSIUM 25 MG/1
25 TABLET ORAL DAILY
Qty: 90 TABLET | Refills: 1 | Status: SHIPPED | OUTPATIENT
Start: 2024-07-24

## 2024-07-24 RX ORDER — BUPROPION HYDROCHLORIDE 150 MG/1
150 TABLET ORAL EVERY MORNING
Qty: 90 TABLET | Refills: 1 | Status: SHIPPED | OUTPATIENT
Start: 2024-07-24

## 2024-08-22 DIAGNOSIS — G89.29 CHRONIC MIDLINE LOW BACK PAIN WITHOUT SCIATICA: ICD-10-CM

## 2024-08-22 DIAGNOSIS — M16.9 OSTEOARTHRITIS OF HIP, UNSPECIFIED LATERALITY, UNSPECIFIED OSTEOARTHRITIS TYPE: ICD-10-CM

## 2024-08-22 DIAGNOSIS — F98.8 ATTENTION DEFICIT DISORDER, UNSPECIFIED HYPERACTIVITY PRESENCE: ICD-10-CM

## 2024-08-22 DIAGNOSIS — M54.50 CHRONIC MIDLINE LOW BACK PAIN WITHOUT SCIATICA: ICD-10-CM

## 2024-08-22 RX ORDER — TRAMADOL HYDROCHLORIDE 50 MG/1
50 TABLET ORAL EVERY 6 HOURS PRN
Qty: 100 TABLET | Refills: 0 | Status: SHIPPED | OUTPATIENT
Start: 2024-08-22

## 2024-08-22 RX ORDER — TRAMADOL HYDROCHLORIDE 50 MG/1
50 TABLET ORAL EVERY 6 HOURS PRN
Qty: 100 TABLET | Refills: 0 | OUTPATIENT
Start: 2024-08-22

## 2024-08-22 RX ORDER — DEXTROAMPHETAMINE SACCHARATE, AMPHETAMINE ASPARTATE MONOHYDRATE, DEXTROAMPHETAMINE SULFATE AND AMPHETAMINE SULFATE 7.5; 7.5; 7.5; 7.5 MG/1; MG/1; MG/1; MG/1
30 CAPSULE, EXTENDED RELEASE ORAL EVERY MORNING
Qty: 30 CAPSULE | Refills: 0 | Status: SHIPPED | OUTPATIENT
Start: 2024-08-22

## 2024-08-22 RX ORDER — DEXTROAMPHETAMINE SACCHARATE, AMPHETAMINE ASPARTATE, DEXTROAMPHETAMINE SULFATE AND AMPHETAMINE SULFATE 1.25; 1.25; 1.25; 1.25 MG/1; MG/1; MG/1; MG/1
5 TABLET ORAL DAILY
Qty: 30 TABLET | Refills: 0 | Status: SHIPPED | OUTPATIENT
Start: 2024-08-22

## 2024-09-13 RX ORDER — HYDROCHLOROTHIAZIDE 12.5 MG/1
12.5 TABLET ORAL DAILY
Qty: 90 TABLET | Refills: 5 | Status: SHIPPED | OUTPATIENT
Start: 2024-09-13

## 2024-09-17 RX ORDER — CITALOPRAM HYDROBROMIDE 40 MG/1
40 TABLET ORAL DAILY
Qty: 90 TABLET | Refills: 3 | Status: SHIPPED | OUTPATIENT
Start: 2024-09-17

## 2024-09-19 ENCOUNTER — OFFICE VISIT (OUTPATIENT)
Dept: FAMILY MEDICINE CLINIC | Facility: CLINIC | Age: 59
End: 2024-09-19
Payer: COMMERCIAL

## 2024-09-19 VITALS
WEIGHT: 205.63 LBS | HEART RATE: 73 BPM | HEIGHT: 65.5 IN | TEMPERATURE: 99 F | BODY MASS INDEX: 33.85 KG/M2 | SYSTOLIC BLOOD PRESSURE: 136 MMHG | DIASTOLIC BLOOD PRESSURE: 86 MMHG

## 2024-09-19 DIAGNOSIS — Z12.31 BREAST CANCER SCREENING BY MAMMOGRAM: ICD-10-CM

## 2024-09-19 DIAGNOSIS — F32.A DEPRESSION, UNSPECIFIED DEPRESSION TYPE: ICD-10-CM

## 2024-09-19 DIAGNOSIS — E66.9 OBESITY (BMI 30-39.9): ICD-10-CM

## 2024-09-19 DIAGNOSIS — I10 PRIMARY HYPERTENSION: ICD-10-CM

## 2024-09-19 DIAGNOSIS — J45.20 MILD INTERMITTENT REACTIVE AIRWAY DISEASE WITHOUT COMPLICATION (HCC): ICD-10-CM

## 2024-09-19 DIAGNOSIS — Z12.2 SCREENING FOR LUNG CANCER: ICD-10-CM

## 2024-09-19 DIAGNOSIS — Z72.0 TOBACCO USE: ICD-10-CM

## 2024-09-19 DIAGNOSIS — E78.5 HYPERLIPIDEMIA, UNSPECIFIED HYPERLIPIDEMIA TYPE: ICD-10-CM

## 2024-09-19 DIAGNOSIS — Z00.00 WELL ADULT EXAM: Primary | ICD-10-CM

## 2024-09-19 DIAGNOSIS — F98.8 ATTENTION DEFICIT DISORDER, UNSPECIFIED TYPE: ICD-10-CM

## 2024-09-19 DIAGNOSIS — M16.9 OSTEOARTHRITIS OF HIP, UNSPECIFIED LATERALITY, UNSPECIFIED OSTEOARTHRITIS TYPE: ICD-10-CM

## 2024-09-19 DIAGNOSIS — E03.9 HYPOTHYROIDISM, UNSPECIFIED TYPE: ICD-10-CM

## 2024-09-19 LAB
ALT SERPL-CCNC: 26 U/L
ANION GAP SERPL CALC-SCNC: 3 MMOL/L (ref 0–18)
AST SERPL-CCNC: 20 U/L (ref ?–34)
BUN BLD-MCNC: 13 MG/DL (ref 9–23)
CALCIUM BLD-MCNC: 10.2 MG/DL (ref 8.7–10.4)
CHLORIDE SERPL-SCNC: 108 MMOL/L (ref 98–112)
CHOLEST SERPL-MCNC: 235 MG/DL (ref ?–200)
CO2 SERPL-SCNC: 28 MMOL/L (ref 21–32)
CREAT BLD-MCNC: 0.78 MG/DL
EGFRCR SERPLBLD CKD-EPI 2021: 86 ML/MIN/1.73M2 (ref 60–?)
ERYTHROCYTE [DISTWIDTH] IN BLOOD BY AUTOMATED COUNT: 13.6 %
FASTING PATIENT LIPID ANSWER: YES
FASTING STATUS PATIENT QL REPORTED: YES
GLUCOSE BLD-MCNC: 112 MG/DL (ref 70–99)
HCT VFR BLD AUTO: 43.3 %
HDLC SERPL-MCNC: 56 MG/DL (ref 40–59)
HGB BLD-MCNC: 14.5 G/DL
LDLC SERPL CALC-MCNC: 154 MG/DL (ref ?–100)
MCH RBC QN AUTO: 31 PG (ref 26–34)
MCHC RBC AUTO-ENTMCNC: 33.5 G/DL (ref 31–37)
MCV RBC AUTO: 92.5 FL
NONHDLC SERPL-MCNC: 179 MG/DL (ref ?–130)
OSMOLALITY SERPL CALC.SUM OF ELEC: 289 MOSM/KG (ref 275–295)
PLATELET # BLD AUTO: 306 10(3)UL (ref 150–450)
POTASSIUM SERPL-SCNC: 4.6 MMOL/L (ref 3.5–5.1)
RBC # BLD AUTO: 4.68 X10(6)UL
SODIUM SERPL-SCNC: 139 MMOL/L (ref 136–145)
T4 FREE SERPL-MCNC: 1.8 NG/DL (ref 0.8–1.7)
TRIGL SERPL-MCNC: 142 MG/DL (ref 30–149)
TSI SER-ACNC: 0.14 MIU/ML (ref 0.55–4.78)
VLDLC SERPL CALC-MCNC: 27 MG/DL (ref 0–30)
WBC # BLD AUTO: 6.1 X10(3) UL (ref 4–11)

## 2024-09-19 PROCEDURE — 3075F SYST BP GE 130 - 139MM HG: CPT | Performed by: FAMILY MEDICINE

## 2024-09-19 PROCEDURE — 84460 ALANINE AMINO (ALT) (SGPT): CPT | Performed by: FAMILY MEDICINE

## 2024-09-19 PROCEDURE — 84443 ASSAY THYROID STIM HORMONE: CPT | Performed by: FAMILY MEDICINE

## 2024-09-19 PROCEDURE — 3008F BODY MASS INDEX DOCD: CPT | Performed by: FAMILY MEDICINE

## 2024-09-19 PROCEDURE — 99396 PREV VISIT EST AGE 40-64: CPT | Performed by: FAMILY MEDICINE

## 2024-09-19 PROCEDURE — 84439 ASSAY OF FREE THYROXINE: CPT | Performed by: FAMILY MEDICINE

## 2024-09-19 PROCEDURE — 3079F DIAST BP 80-89 MM HG: CPT | Performed by: FAMILY MEDICINE

## 2024-09-19 PROCEDURE — 85027 COMPLETE CBC AUTOMATED: CPT | Performed by: FAMILY MEDICINE

## 2024-09-19 PROCEDURE — 80048 BASIC METABOLIC PNL TOTAL CA: CPT | Performed by: FAMILY MEDICINE

## 2024-09-19 PROCEDURE — 80061 LIPID PANEL: CPT | Performed by: FAMILY MEDICINE

## 2024-09-19 PROCEDURE — 84450 TRANSFERASE (AST) (SGOT): CPT | Performed by: FAMILY MEDICINE

## 2024-09-19 NOTE — PROGRESS NOTES
Carolina Allison is a 59 year old female.    CC:    Chief Complaint   Patient presents with    Physical       HPI:  Patient is here for yearly, wellness exam  Last Lipid:  Lab Results   Component Value Date    CHOLEST 294 (H) 12/08/2021    TRIG 149 12/08/2021    HDL 56 12/08/2021     (H) 12/08/2021    VLDL 30 12/08/2021    TCHDLRATIO 5.33 (H) 01/23/2018    NONHDLC 238 12/08/2021   The 10-year ASCVD risk score (Wilbert MARTINO, et al., 2019) is: 14.4%    Last colon cancer screen:  Colonoscopy  04/19/2017 to be repeated 2022    Last mammo: 07/07/2023    Last Pap: > 5 years    Immunization History   Administered Date(s) Administered    >=9 YRS AFLURIA TRI PRESERV FREE SINGLE DOSE (79062) FLU CLINIC 10/26/2015    Covid-19 Vaccine Pfizer 30 mcg/0.3 ml 02/02/2021, 03/16/2021, 11/18/2021    FLULAVAL 6 months & older 0.5 ml Prefilled syringe (03002) 09/29/2020, 12/08/2021    FLUZONE 6 months and older PFS 0.5 ml (44491) 10/26/2015, 11/06/2018, 10/17/2019, 10/31/2019, 10/22/2022    Flucelvax Influenza vaccine, trivalent (ccIIV3), 0.5mL IM 10/19/2023    HEP B, Adult 04/11/2016, 03/08/2017, 09/05/2018    TDAP 04/04/2016, 05/12/2023    Tb Intradermal Test 04/04/2016, 04/11/2016    Zoster Vaccine Recombinant Adjuvanted (Shingrix) 05/12/2023, 10/19/2023          Patient Active Problem List   Diagnosis    ADD (attention deficit disorder): adderall working well, no obvious side effects    Hypothyroidism. Levothyroxine as directed, due for labs  Lab Results   Component Value Date    T4F 1.6 07/05/2023    TSH 0.013 (L) 07/05/2023         Reactive airway disease (HCC): Albuterol prn, < 2 times per week, no nocturnal symptoms    Chronic midline low back pain without sciatica: Ultram prn    Hyperlipidemia: Lipitor, due for labs     Depression: SSRI and Wellbutrin working fine    Obesity (BMI 30-39.9): no prescribed meds    R hip DJD: undergoing stem cell injections. Ultram prn    Primary hypertension: hydrochlorothiazide, losartan, no  home BP to review    Tobacco use: still smoking, realizes she should quit      Allergies:  No Known Allergies   Current Meds:  Current Outpatient Medications   Medication Sig Dispense Refill    CITALOPRAM 40 MG Oral Tab TAKE 1 TABLET DAILY 90 tablet 3    hydroCHLOROthiazide 12.5 MG Oral Tab Take 1 tablet (12.5 mg total) by mouth daily. 90 tablet 5    amphetamine-dextroamphetamine (ADDERALL) 5 MG Oral Tab Take 1 tablet (5 mg total) by mouth daily. 30 tablet 0    amphetamine-dextroamphetamine ER (ADDERALL XR) 30 MG Oral Capsule SR 24 Hr Take 1 capsule (30 mg total) by mouth every morning. 30 capsule 0    traMADol 50 MG Oral Tab Take 1 tablet (50 mg total) by mouth every 6 (six) hours as needed for Pain. 100 tablet 0    losartan 25 MG Oral Tab Take 2 tablets (50 mg total) by mouth daily.      buPROPion  MG Oral Tablet 24 Hr Take 1 tablet (150 mg total) by mouth every morning. 90 tablet 1    atorvastatin 10 MG Oral Tab TAKE 1 TABLET DAILY 90 tablet 1    albuterol 108 (90 Base) MCG/ACT Inhalation Aero Soln Inhale 2 puffs into the lungs every 4 (four) hours as needed for Wheezing. 25.5 g 0    levothyroxine 50 MCG Oral Tab Take 1 tablet (50 mcg total) by mouth before breakfast. To be taken with 200 mcg for total of 250 mcg per day 90 tablet 1    levothyroxine 200 MCG Oral Tab Take 1 tablet (200 mcg total) by mouth daily. 90 tablet 3    Clindamycin Phos-Benzoyl Perox 1-5 % External Gel Apply 1 Application topically 2 (two) times daily. 150 g 3        History:  Past Medical History:    Acne rosacea    ADD (attention deficit disorder)    Chronic midline low back pain without sciatica    Depression    Hyperlipidemia    Hypothyroid    Osteoarthritis    lumbar, R hip    Primary hypertension    Reactive airway disease (HCC)    Visual impairment    reading glasses      Past Surgical History:   Procedure Laterality Date                X2    Colonoscopy N/A 2017    Procedure: COLONOSCOPY, POSSIBLE  BIOPSY, POSSIBLE POLYPECTOMY 16132;  Surgeon: Noé Suárez DO;  Location: [facility]    Hip arthroplasty Left     So biopsy stereo nodule 2 site bilat (cpt=19081/94433)      Other surgical history      partial thyroidectomy    Other surgical history      BACK SURGERY    Other surgical history      FEET SURGERY      Family History   Problem Relation Age of Onset    Heart Disorder Father     Hypertension Mother       Family Status   Relation Status    Fa Alive    Mo Alive      Social History     Socioeconomic History    Marital status:    Tobacco Use    Smoking status: Every Day     Current packs/day: 1.00     Average packs/day: 1 pack/day for 20.0 years (20.0 ttl pk-yrs)     Types: Cigarettes    Smokeless tobacco: Never   Vaping Use    Vaping status: Never Used   Substance and Sexual Activity    Alcohol use: Not Currently     Comment: social    Drug use: No   Social History Narrative    ** Merged History Encounter **          Social Determinants of Health      [facility]    Social Connections    [facility]    Housing Stability        ROS:  General: energy level stable  GI: Denies hematochezia   (male): Denies hematuria    Vitals: /86   Pulse 73   Temp 98.7 °F (37.1 °C) (Temporal)   Ht 5' 5.5\" (1.664 m)   Wt 205 lb 9.6 oz (93.3 kg)   BMI 33.69 kg/m²     Reviewed by CHEVY Herrera M.D.  Wt Readings from Last 5 Encounters:   09/19/24 205 lb 9.6 oz (93.3 kg)   08/11/23 210 lb (95.3 kg)   08/01/23 210 lb (95.3 kg)   07/05/23 212 lb (96.2 kg)   08/31/22 228 lb (103.4 kg)       Physical Exam:  GEN: well developed, well nourished, in no apparent distress  EYE: B conjunctiva and lids normal  HENT: normocephalic; normal nose, pharynx and TM's  NECK: No lymphadenopathy, thyromegaly or masses  CAR: S1, S2 normal, RRR; no S3, no S4; no click; murmur negative  PULM: clear to  auscultation B, no accessory muscle use  GI: normal active BS+, soft, nondistended; no HSM; no masses; no bruits; no masses; nontender, no G/R/R   PSYCH: alert and oriented x 3; affect appropriate  SKIN: not examined  EXTREMITIES: No clubbing, cyanosis or edema  GENITAL: not examined  LYMPH: no supraclavicular nodes  NEURO: Awake and alert. Normal speech and articulation. No facial droop or asymmetry. Moving all extremities equally. Symmetric B patellar DTRs    ASSESSMENT AND PLAN    1. Well adult exam  Colonoscopy recommended for colon cancer screening. She will contact her surgeon to schedule  Recommended cervical cancer screening consult with GYN  Await results   - [facility] GERHARD 2D+3D SCREENING BILAT (CPT=77067/54080); Future  - VENIPUNCTURE  - ALT(SGPT); Future  - AST (SGOT); Future  - Basic Metabolic Panel (8); Future  - CBC, Platelet; No Differential; Future  - Lipid Panel; Future  - TSH and Free T4; Future    2. Breast cancer screening by mammogram  Await results   - [facility] GERHARD 2D+3D SCREENING BILAT (CPT=77067/92416); Future    3. Tobacco use  Encouraged cessation  - CT LUNG LD SCREENING(CPT=71271); Future    4. Screening for lung cancer  Await results   - CT LUNG LD SCREENING(CPT=71271); Future    5. Hyperlipidemia, unspecified hyperlipidemia type  Await results   Continue Lipitor  - ALT(SGPT); Future  - AST (SGOT); Future  - Lipid Panel; Future    6. Primary hypertension  Stable   Continue ARB and hydrochlorothiazide   Await results   - Basic Metabolic Panel (8); Future    7. Mild intermittent reactive airway disease without complication (HCC)  Stable  Albuterol prn    8. Hypothyroidism, unspecified type  Continue Levothyroxine   Await results   - TSH and Free T4; Future    9. Obesity (BMI 30-39.9)  She is slowly losing weight over the past 2 years. Encouragement given to continue the trend    10. Attention deficit disorder, unspecified type  Stable  Continue Adderall XR and IR   F/u in 6 months    11. Depression,  unspecified depression type  Stable   Continue Celexa and Wellbutrin    12. Osteoarthritis of hip, unspecified laterality, unspecified osteoarthritis type  Undergoing stem cell injections  Ultram prn       No follow-ups on file.    Orders for this visit:    Orders Placed This Encounter   Procedures    ALT(SGPT)     Standing Status:   Future     Number of Occurrences:   1     Standing Expiration Date:   9/19/2025    AST (SGOT)     Standing Status:   Future     Number of Occurrences:   1     Standing Expiration Date:   9/19/2025    Basic Metabolic Panel (8)     Standing Status:   Future     Number of Occurrences:   1     Standing Expiration Date:   9/19/2025    CBC, Platelet; No Differential     Standing Status:   Future     Number of Occurrences:   1     Standing Expiration Date:   9/19/2025    Lipid Panel     Standing Status:   Future     Number of Occurrences:   1     Standing Expiration Date:   9/19/2025    TSH and Free T4     Standing Status:   Future     Number of Occurrences:   1     Standing Expiration Date:   9/19/2025    VENIPUNCTURE     Order Specific Question:   Release to patient     Answer:   Immediate       [facility] GERHARD 2D+3D SCREENING BILAT (CPT=77067/85987)  CT LUNG LD SCREENING(CPT=71271)    Meds & Refills for this Visit:  Requested Prescriptions      No prescriptions requested or ordered in this encounter             Authorized by Cleve Herrera M.D.

## 2024-09-20 ENCOUNTER — PATIENT MESSAGE (OUTPATIENT)
Dept: FAMILY MEDICINE CLINIC | Facility: CLINIC | Age: 59
End: 2024-09-20

## 2024-09-20 DIAGNOSIS — E78.5 HYPERLIPIDEMIA, UNSPECIFIED HYPERLIPIDEMIA TYPE: Primary | ICD-10-CM

## 2024-09-20 RX ORDER — ATORVASTATIN CALCIUM 20 MG/1
20 TABLET, FILM COATED ORAL NIGHTLY
Qty: 90 TABLET | Refills: 0 | Status: SHIPPED | OUTPATIENT
Start: 2024-09-20

## 2024-09-20 NOTE — TELEPHONE ENCOUNTER
From: Carolina Allison  To: Cleve Herrera  Sent: 9/20/2024 10:07 AM CDT  Subject: Follow up     Hi! I’ve read the results and I am done with the changes you want to make.  [facility] on [address] is where you can send prescriptions  Thank you!  Carolina Allison

## 2024-09-27 RX ORDER — LEVOTHYROXINE SODIUM 50 UG/1
50 TABLET ORAL
Qty: 90 TABLET | Refills: 1 | OUTPATIENT
Start: 2024-09-27

## 2024-09-27 RX ORDER — LEVOTHYROXINE SODIUM 25 UG/1
25 TABLET ORAL
Qty: 90 TABLET | Refills: 0 | Status: SHIPPED | OUTPATIENT
Start: 2024-09-27

## 2024-10-03 RX ORDER — LEVOTHYROXINE SODIUM 200 UG/1
200 TABLET ORAL DAILY
Qty: 90 TABLET | Refills: 3 | Status: SHIPPED | OUTPATIENT
Start: 2024-10-03

## 2024-10-06 DIAGNOSIS — M16.9 OSTEOARTHRITIS OF HIP, UNSPECIFIED LATERALITY, UNSPECIFIED OSTEOARTHRITIS TYPE: ICD-10-CM

## 2024-10-06 DIAGNOSIS — M54.50 CHRONIC MIDLINE LOW BACK PAIN WITHOUT SCIATICA: ICD-10-CM

## 2024-10-06 DIAGNOSIS — G89.29 CHRONIC MIDLINE LOW BACK PAIN WITHOUT SCIATICA: ICD-10-CM

## 2024-10-07 RX ORDER — TRAMADOL HYDROCHLORIDE 50 MG/1
50 TABLET ORAL EVERY 6 HOURS PRN
Qty: 100 TABLET | Refills: 0 | Status: SHIPPED | OUTPATIENT
Start: 2024-10-07

## 2024-10-21 DIAGNOSIS — I10 PRIMARY HYPERTENSION: ICD-10-CM

## 2024-10-21 RX ORDER — LOSARTAN POTASSIUM 25 MG/1
50 TABLET ORAL DAILY
Qty: 90 TABLET | Refills: 3 | Status: SHIPPED | OUTPATIENT
Start: 2024-10-21

## 2024-11-01 DIAGNOSIS — M16.9 OSTEOARTHRITIS OF HIP, UNSPECIFIED LATERALITY, UNSPECIFIED OSTEOARTHRITIS TYPE: ICD-10-CM

## 2024-11-01 DIAGNOSIS — M54.50 CHRONIC MIDLINE LOW BACK PAIN WITHOUT SCIATICA: ICD-10-CM

## 2024-11-01 DIAGNOSIS — F98.8 ATTENTION DEFICIT DISORDER, UNSPECIFIED HYPERACTIVITY PRESENCE: ICD-10-CM

## 2024-11-01 DIAGNOSIS — G89.29 CHRONIC MIDLINE LOW BACK PAIN WITHOUT SCIATICA: ICD-10-CM

## 2024-11-01 RX ORDER — TRAMADOL HYDROCHLORIDE 50 MG/1
50 TABLET ORAL EVERY 6 HOURS PRN
Qty: 100 TABLET | Refills: 0 | Status: SHIPPED | OUTPATIENT
Start: 2024-11-01

## 2024-11-01 RX ORDER — DEXTROAMPHETAMINE SACCHARATE, AMPHETAMINE ASPARTATE MONOHYDRATE, DEXTROAMPHETAMINE SULFATE AND AMPHETAMINE SULFATE 7.5; 7.5; 7.5; 7.5 MG/1; MG/1; MG/1; MG/1
30 CAPSULE, EXTENDED RELEASE ORAL EVERY MORNING
Qty: 30 CAPSULE | Refills: 0 | Status: SHIPPED | OUTPATIENT
Start: 2024-11-01

## 2024-11-01 RX ORDER — DEXTROAMPHETAMINE SACCHARATE, AMPHETAMINE ASPARTATE, DEXTROAMPHETAMINE SULFATE AND AMPHETAMINE SULFATE 1.25; 1.25; 1.25; 1.25 MG/1; MG/1; MG/1; MG/1
5 TABLET ORAL DAILY
Qty: 30 TABLET | Refills: 0 | Status: SHIPPED | OUTPATIENT
Start: 2024-11-01

## 2024-11-14 ENCOUNTER — TELEPHONE (OUTPATIENT)
Facility: CLINIC | Age: 59
End: 2024-11-14

## 2024-11-14 DIAGNOSIS — M25.551 RIGHT HIP PAIN: Primary | ICD-10-CM

## 2024-11-14 NOTE — TELEPHONE ENCOUNTER
Patient scheduled for right hip pain.    Future Appointments   Date Time Provider Department Center   11/15/2024 10:00 AM Elaine Mckeon PA-C [facility]     Patient is aware to arrive 15-20 mins early for possible imaging.    Please advise if imaging is needed.

## 2024-11-15 ENCOUNTER — OFFICE VISIT (OUTPATIENT)
Dept: ORTHOPEDICS CLINIC | Facility: CLINIC | Age: 59
End: 2024-11-15
Payer: COMMERCIAL

## 2024-11-15 ENCOUNTER — HOSPITAL ENCOUNTER (OUTPATIENT)
Dept: GENERAL RADIOLOGY | Age: 59
Discharge: HOME | End: 2024-11-15
Payer: COMMERCIAL

## 2024-11-15 VITALS — BODY MASS INDEX: 32.96 KG/M2 | HEIGHT: 67 IN | WEIGHT: 210 LBS

## 2024-11-15 DIAGNOSIS — M16.11 PRIMARY OSTEOARTHRITIS OF RIGHT HIP: Primary | ICD-10-CM

## 2024-11-15 DIAGNOSIS — M25.551 RIGHT HIP PAIN: ICD-10-CM

## 2024-11-15 PROCEDURE — 73502 X-RAY EXAM HIP UNI 2-3 VIEWS: CPT

## 2024-11-15 PROCEDURE — 99213 OFFICE O/P EST LOW 20 MIN: CPT

## 2024-11-15 PROCEDURE — 3008F BODY MASS INDEX DOCD: CPT

## 2024-11-15 NOTE — PROGRESS NOTES
[facility] New Patient Note    CC: right hip pain   Chief Complaint   Patient presents with    Hip Pain     Right hip pain . Patient of    Patient states she's having the same symptoms as when she seen  in the past   She states he told her she would need the right hip replaced eventually as he replaced her left hip          HPI: This 59 year old female presents today with complaints of right hip pain.  She underwent a left total hip arthroplasty in 2017 by Dr. Coleman.  She is very happy with how the left hip turned out and reports it has been functioning very well and she has not experienced any complications.  As for the right hip, she has been dealing with this pain for several years now and feels it is finally time to get it evaluated.  She has pain about the right hip that radiates into the groin.  It does not radiate into the buttock. She feels her range of motion on the right hip has decreased and she has pain with her activities of daily living.  She denies any numbness or tingling sensation that travels down the thigh or below the knee into the toes.  She has tried steroid injections into the hip which does not seem to provide any relief in symptoms anymore and only last about a day if anything.  She participated in physical therapy which has not provided any relief in symptoms.  She is also getting stem cell treatment which used to provide some relief but does not anymore.  She is here today for further evaluation.    Past Medical History:    Acne rosacea    ADD (attention deficit disorder)    Chronic midline low back pain without sciatica    Depression    Hyperlipidemia    Hypothyroid    Osteoarthritis    lumbar, R hip    Primary hypertension    Reactive airway disease (HCC)    Visual impairment    reading glasses     Past Surgical History:   Procedure Laterality Date                X2    Colonoscopy N/A 2017    Procedure: COLONOSCOPY, POSSIBLE BIOPSY,  POSSIBLE POLYPECTOMY 67638;  Surgeon: Noé Suárez DO;  Location: [facility]    Hip arthroplasty Left     So biopsy stereo nodule 2 site bilat (cpt=19081/82346)      Other surgical history      partial thyroidectomy    Other surgical history      BACK SURGERY    Other surgical history      FEET SURGERY     Current Outpatient Medications   Medication Sig Dispense Refill    traMADol 50 MG Oral Tab Take 1 tablet (50 mg total) by mouth every 6 (six) hours as needed for Pain. 100 tablet 0    amphetamine-dextroamphetamine (ADDERALL) 5 MG Oral Tab Take 1 tablet (5 mg total) by mouth daily. 30 tablet 0    amphetamine-dextroamphetamine ER (ADDERALL XR) 30 MG Oral Capsule SR 24 Hr Take 1 capsule (30 mg total) by mouth every morning. 30 capsule 0    losartan 25 MG Oral Tab Take 2 tablets (50 mg total) by mouth daily. 90 tablet 3    LEVOTHYROXINE 200 MCG Oral Tab TAKE 1 TABLET DAILY 90 tablet 3    levothyroxine (SYNTHROID) 25 MCG Oral Tab Take 1 tablet (25 mcg total) by mouth before breakfast. 90 tablet 0    atorvastatin 20 MG Oral Tab Take 1 tablet (20 mg total) by mouth nightly. 90 tablet 0    CITALOPRAM 40 MG Oral Tab TAKE 1 TABLET DAILY 90 tablet 3    hydroCHLOROthiazide 12.5 MG Oral Tab Take 1 tablet (12.5 mg total) by mouth daily. 90 tablet 5    buPROPion  MG Oral Tablet 24 Hr Take 1 tablet (150 mg total) by mouth every morning. 90 tablet 1    albuterol 108 (90 Base) MCG/ACT Inhalation Aero Soln Inhale 2 puffs into the lungs every 4 (four) hours as needed for Wheezing. 25.5 g 0    Clindamycin Phos-Benzoyl Perox 1-5 % External Gel Apply 1 Application topically 2 (two) times daily. 150 g 3     Allergies[1]  Family History   Problem Relation Age of Onset    Heart Disorder Father     Hypertension Mother      Social History     Occupational History    Not on file   Tobacco Use    Smoking status: Every Day     Current packs/day: 1.00     Average packs/day: 1 pack/day for 20.0 years (20.0 ttl pk-yrs)     Types:  Cigarettes    Smokeless tobacco: Never    Tobacco comments:     1/2 ppd      Updated 11/15/24   Vaping Use    Vaping status: Never Used   Substance and Sexual Activity    Alcohol use: Not Currently     Comment: social    Drug use: No    Sexual activity: Not on file        ROS:  Comprehensive system review obtained and negative except as mentioned above    Physical Exam:    Ht 5' 7\" (1.702 m)   Wt 210 lb (95.3 kg)   BMI 32.89 kg/m²   Constitutional: Awake, alert, no distress. Very pleasant and conversational.  Psychological: Appropriate affect.  Respiratory: Unlabored breathing.  Right lower extremity:  Inspection: skin is intact without any redness, deformity, or effusion.   Palpation: No tenderness to palpation about the greater trochanteric bursa. No tenderness to palpation about the proximal or distal IT band. No tenderness to palpation over the quadriceps.  Range of motion: Internal rotation of hip to approximately 15 degrees. External rotation of hip to approximately 30 degrees. Pain with internal rotation of the hip.  Stinchfield test negative.   Neuromuscular: Strength is normal and sensation is intact.  Vascular: Extremities are warm and well-perfused.  Lymph: Unremarkable.    Imaging: Imaging was personally viewed, independently interpreted and radiology report read. They show:  XR HIP W OR WO PELVIS 2 OR 3 VIEWS, RIGHT (CPT=73502)    Result Date: 11/15/2024  CONCLUSION:  Slight worsening of severe osteoarthritis of the right hip.   LOCATION:  [facility]   Dictated by (CST): Luis Angel Ambriz MD on 11/15/2024 at 10:53 AM     Finalized by (CST): Luis Angel Ambriz MD on 11/15/2024 at 10:53 AM           Assessment/Plan:  Assessment: 59 year old female with severe osteoarthritis of the right hip     Plan: I discussed the etiology, natural history, and management options for symptomatic hip osteoarthritis.  I discussed nonsurgical and surgical treatments, with nonsurgical treatments to include anti-inflammatory  medications, injections, activity modification, weight loss, low impact exercise and possible therapy.  Surgery would be with hip replacement and is an elective operation reserved for when nonsurgical treatments no longer alleviate symptoms sufficiently.  She has already had multiple steroid injections into the hip which used to provide some relief in symptoms but now are only lasting about a day if anything.  She also participated in extensive physical therapy which does not seem to be helping.  At this point she feels she has exhausted all of her conservative treatment options and is interested in undergoing a right total hip arthroplasty by Dr. Mckeon which would be reasonable.  She would like to get this done as soon as possible.  I encouraged her to set up an appointment with Dr. Mckeon soon to discuss surgical intervention further.  She will set up an appointment with him for next week and at that time we will likely put in the surgical booking sheet.  All questions and concerns were addressed and answered to the patient's satisfaction.  She is in agreement with treatment plan going forward.    YESSENIA Ruiz, PA-C  Orthopedic Surgery   t: [phone]  f: [phone]           This document was partially prepared using Dragon Medical voice recognition software.  Although every attempt is made to correct errors during dictation, discrepancies may still exist. Please contact me with any questions or clarifications.       [1] No Known Allergies

## 2024-11-22 ENCOUNTER — OFFICE VISIT (OUTPATIENT)
Dept: ORTHOPEDICS CLINIC | Facility: CLINIC | Age: 59
End: 2024-11-22
Payer: COMMERCIAL

## 2024-11-22 ENCOUNTER — TELEPHONE (OUTPATIENT)
Dept: ORTHOPEDICS CLINIC | Facility: CLINIC | Age: 59
End: 2024-11-22

## 2024-11-22 VITALS — BODY MASS INDEX: 31.39 KG/M2 | HEIGHT: 67 IN | WEIGHT: 200 LBS

## 2024-11-22 DIAGNOSIS — M16.11 PRIMARY OSTEOARTHRITIS OF RIGHT HIP: Primary | ICD-10-CM

## 2024-11-22 DIAGNOSIS — Z96.642 S/P TOTAL LEFT HIP ARTHROPLASTY: ICD-10-CM

## 2024-11-22 PROCEDURE — 99213 OFFICE O/P EST LOW 20 MIN: CPT | Performed by: STUDENT IN AN ORGANIZED HEALTH CARE EDUCATION/TRAINING PROGRAM

## 2024-11-22 PROCEDURE — 3008F BODY MASS INDEX DOCD: CPT | Performed by: STUDENT IN AN ORGANIZED HEALTH CARE EDUCATION/TRAINING PROGRAM

## 2024-11-22 NOTE — TELEPHONE ENCOUNTER
Date of Surgery: 2/19/25     Post Op Appt: 3/7/25 @ 1000      Case ID: [id number]    Notes: NEEDS TO GO OVER PRE-OP INSTRUCTIONS      SURGERY SCHEDULING SHEET     Carolina Allison  7/7/1965  [id number]     Procedure: Right anterior total hip arthroplasty     CPT: 90514     Diagnosis: Right hip osteoarthritis     Anesthesia: Spinal     Antibiotics: IV Ancef     TXA: IV      Anticoagulation: ASA     Length of Surgery: 3 hours     Disposition: Outpatient in a Bed     Instruments: Sublette JAKI - Trident II with Insignia Stem     Assist: Ask for Wesley Gold ([phone]) first assist. If Wesley unavailable, then please contact Francois Torres for first assist. If Wesley and Francois unavailable, then contact [facility] for first assist.     Pre-op Testing: CBC, CMP, PT/INR, PTT, TYPE AND SCREEN, and MRSA/MSSA THROUGH [facility] PAT     Clearance: MEDICAL/PCP     Post op: 2 weeks with Dr Mckeon     Surgery date specifics: Next available     Charles Mckeon MD  Adult Reconstruction     Department of Orthopaedic Surgery  [facility]     [address] [place] [address]  [address] [place] [address]     t: [phone]  f: [phone]       [facility]

## 2024-11-22 NOTE — PROGRESS NOTES
Orthopaedic Surgery  [address] [place] [address]   [phone]     Chief Complaint:  Right Hip Pain    History of Present Illness:   Carolina Allison is a 59 year old female seen in clinic today as new to me but established to the practice for evaluation of their right hip. Symptoms have been present for many years. Pain is located at the groin and described as aching. No posterior or lateral hip pain. Their joint pain interferes with their activities of daily life such as walking, going up or down stairs, and exercising. Their condition is worsening. Associated with their joint pain, they report stiffness. Their symptoms are worsened by weightbearing activity and as the day progresses. Their symptoms are made better by rest. No prior surgeries to the hip. She has a history of LEFT total hip arthroplasty done with Dr Coleman in 2017. She has been doing well with that hip and is happy with the outcome.    Prior imaging studies have included XRs. Treatment so far has consisted of conservative management including NSAIDs, combination of rest, ice, or elevation, physical therapy, corticosteroid injections, and PRP injections. Last CSI was over 1 year ago and provided about 1 day of relief, last PRP injection was in September this year. She reports these did not offer any relief.     Assistive devices used: none          PMH/PSH/Family History/Social History/Meds/Allergies:   Past Medical History:    Acne rosacea    ADD (attention deficit disorder)    Chronic midline low back pain without sciatica    Depression    Hyperlipidemia    Hypothyroid    Osteoarthritis    lumbar, R hip    Primary hypertension    Reactive airway disease (HCC)    Visual impairment    reading glasses       Past Surgical History:   Procedure Laterality Date                X2    Colonoscopy N/A 2017    Procedure: COLONOSCOPY, POSSIBLE BIOPSY, POSSIBLE POLYPECTOMY 35253;  Surgeon: Noé Suárez DO;   Location: [facility]    Hip arthroplasty Left     So biopsy stereo nodule 2 site bilat (cpt=19081/61282)      Other surgical history      partial thyroidectomy    Other surgical history      BACK SURGERY    Other surgical history      FEET SURGERY       Family History   Problem Relation Age of Onset    Heart Disorder Father     Hypertension Mother        Social History     Socioeconomic History    Marital status:      Spouse name: Not on file    Number of children: Not on file    Years of education: Not on file    Highest education level: Not on file   Occupational History    Not on file   Tobacco Use    Smoking status: Every Day     Current packs/day: 1.00     Average packs/day: 1 pack/day for 20.0 years (20.0 ttl pk-yrs)     Types: Cigarettes    Smokeless tobacco: Never    Tobacco comments:     1/2 ppd      Updated 11/15/24   Vaping Use    Vaping status: Never Used   Substance and Sexual Activity    Alcohol use: Not Currently     Comment: social    Drug use: No    Sexual activity: Not on file   Other Topics Concern    Not on file   Social History Narrative    ** Merged History Encounter **          Social Drivers of Health     Financial Resource Strain: Not on file   Food Insecurity: Not on file   Transportation Needs: Not on file   Physical Activity: Not on file   Stress: Not on file (11/4/2024)   Social Connections: Unknown (3/14/2021)    [facility]    Social Connections     Conversations with friends/family/neighbors per week: Not on file   Housing Stability: Low Risk  (7/7/2021)    Received from [facility], [facility]    Housing Stability     Mortgage Payment Concerns?: Not on file     Number of Places Lived in the Last Year: Not on file     Unstable Housing?: Not on file        Current Outpatient Medications   Medication Instructions    albuterol 108 (90 Base) MCG/ACT Inhalation Aero Soln 2  puffs, Inhalation, Every 4 hours PRN    amphetamine-dextroamphetamine (ADDERALL) 5 MG Oral Tab 5 mg, Oral, Daily    amphetamine-dextroamphetamine ER (ADDERALL XR) 30 MG Oral Capsule SR 24 Hr 30 mg, Oral, Every morning    atorvastatin (LIPITOR) 20 mg, Oral, Nightly    buPROPion ER (WELLBUTRIN XL) 150 mg, Oral, Every morning    citalopram (CELEXA) 40 mg, Oral, Daily    Clindamycin Phos-Benzoyl Perox 1-5 % External Gel 1 Application, Topical, 2 times daily    hydroCHLOROthiazide 12.5 mg, Oral, Daily    levothyroxine (SYNTHROID) 25 mcg, Oral, Before breakfast    levothyroxine (SYNTHROID) 200 mcg, Oral, Daily    losartan (COZAAR) 50 mg, Oral, Daily    traMADol (ULTRAM) 50 mg, Oral, Every 6 hours PRN       Allergies[1]      Physical Exam:   Vitals:    11/22/24 0805   Weight: 200 lb (90.7 kg)   Height: 5' 7\" (1.702 m)     Estimated body mass index is 31.32 kg/m² as calculated from the following:    Height as of this encounter: 5' 7\" (1.702 m).    Weight as of this encounter: 200 lb (90.7 kg).    Constitutional: No acute distress, well nourished.  Eyes: Anicteric sclera, pink conjunctiva  Ears, Nose, Mouth and Throat: Normocephalic, atraumatic, moist mucous membranes  Cardiovascular: No pitting edema or varicosities in the lower extremities  Respiratory: No respiratory distress, normal respiratory rhythm and effort   Neurological:  Oriented to person, place, and time.  Psychological:  Appropriate mood and affect.      Comprehensive Right Hip Exam:      Gait: Normal  Leg lengths: No obvious limb length discrepancy  Inspection: No erythema, ecchymoses, or wounds. No rash. No previous incisions noted  Palpation: None  ROM: Flexion: 100 degrees, limited ability to bring knee to chest  Internal Rotation: 0 degrees  External Rotation: 45 degrees  ROM causes pain?: Groin pain is reproduced with internal rotation beyond 0  Strength: Intact 5/5 strength with IP, Quadriceps, TA, GS, and EHL  Sensation: Grossly intact to light touch  over SPN/DPN/Tibial/Sural nerve distributions  Special tests: Stinchfield: painful; Trendelenberg: Negative  Vasc: Warm perfused extremity      Imaging:   XR AP Pelvis and 2 views AP and Lat of the Right Hip from 11/15/24 were personally reviewed and interpreted    There are severe degenerative changes of the hip with loss of joint space, subchondral sclerosis, and osteophyte formation.     There is a press fit total hip arthroplasty present on the left side    No fracture or dislocation noted        Assessment:     ICD-10-CM    1. Primary osteoarthritis of right hip  M16.11       2. S/P total left hip arthroplasty  Z96.642              Plan:   Today, we discussed the patient's clinical and radiographic findings.  They do have advanced arthritis of the right hip.  This is now causing them significant pain and discomfort.  They feel as though their hip is decreasing their quality of life and keeping them from doing usual activities as well as leisure activities.  We discussed treatment options going forward. The patient has had an appropriate course of conservative treatment with NSAID pain medication, activity modification, physical therapy, home exercise program, assistive devices, and intraarticular injections.  We discussed continuing this versus potential surgical intervention in the form of a total hip replacement.  We discussed what the surgery would entail as well as expected postoperative course and expected outcomes.  Additionally,we discussed the possibility of risk inherent with surgery. In particular, we discussed the following categories of risks, among others:     Infection - with the implantation of foreign bodies or implants, there is a significant risk of infection, which can occur either around the time of surgery or any time thereafter. If infection arises, this will likely require at least one other surgery, including possibly removal of the existing components with placement of a temporary  antibiotic spacer made of cement. In more extreme cases, infection can require amputation or permanent removal of components without a functioning prosthetic joint in place.     Blood clot - the surgery itself and subsequent lack of normal mobility increases the risk of blood clot which in more severe cases can cause extended morbidity and even death. If a blood clot occurs, it may require extended medical treatment. We will prescribe a blood thinner after surgery to mitigate this risk, but this does not guarantee that it cannot happen. Blood thinners themselves can cause side effects, such as a hematoma or wound problems.     Leg Length Inequality - efforts are made to equalize leg lengths during joint replacement, but there can be real or perceived leg length inequality that can occur with prosthetic joint replacements. In some cases, increasing or decreasing leg lengths can be the only option available to make the joint stable or well functioning. This can be addressed postoperatively with a shoe lift if it does occur.     Damage to blood vessels (vascular injury) - although rare, if this occurs, the results can be devastating and usually require further treatment, such as another surgery to repair the blood vessel and reestablish blood flow.     Damage to nerves (neurologic injury) - also rare, if this occurs, the results can include decreased function, chronic pain, or disability. With an anterior hip replacement, there is a high likelihood of numbness surrounding the site of the incision. In the majority of cases, this appears to resolve by a year but in some cases it may persist.     Instability - with the implantation of a prosthetic joint, instability, or the joint being loose can occur. This can result in dislocation or pain if it occurs, and would likely require further treatment, including sedation and relocating the hip under sedation, followed by bracing. It may also require further surgery if the  instability is persistent and dislocation occurs several times.     Blood loss - a concern with any surgery, it can require the possibility of a transfusion in more significant cases.     Fracture - this can occur during the surgery, shortly after surgery, or at any point in the future. A fracture around a prosthetic joint can oftentimes be more difficult to treat than fractures that might occur if the prosthetic joint were not present. It can often require surgery to fix, even including revision of the existing parts.     Failure to alleviate pain - while the goal of surgery is to improve pain, there is no way to guarantee this outcome in all cases, and so failure to alleviate pain is an inherent risk of surgery.     Decreased range of motion - with the implantation of a prosthetic joint, the possibility exists of decreased range of motion or stiffness. This can require increased therapy to combat, can be associated with pain, and can even require further surgery if more severe.    Wear - the polyethylene bearing of the hip replacement can wear out with use and time. In younger patients, there is a possibility that a revision surgery will be required to replace this bearing surface at some time in their life. We will continue to follow this with routine XRs following your surgery.    Wound complications - may occur following surgery including delayed wound healing, drainage, or dehiscence. If prolonged, this may require surgery to wash out the wound and joint and re-close the incision. Counseled on the importance of nutritional optimization in the perioperative period     Medical risks - due to the stresses associated with surgery, there are medical risks that can occur, including, more notably, heart attack, stroke, pneumonia, and kidney and other organ failure. While efforts are made to minimize these risks, they still exist.     These were discussed with the patient in a shared decision-making process in  understandable terms where the relative pros and cons were considered. I used illustrations as well as bone models and prosthetic components to explain the procedure and potential complications in detail to the patient. After an informed discussion of risks, benefits, and alternatives, and understanding the risks involved, the patient decided to proceed with surgery. We will therefore proceed with total hip replacement.       Thank you very much for allowing me to participate in the care of this patient. If you have any questions, please do not hesitate to contact me.      Charles Mckeon MD  Adult Hip and Knee Reconstruction    Department of Orthopaedic Surgery  [facility]     [address] [address]  [address] [place] [address]     t: [phone]  f: [phone]       [email or web address]       [1] No Known Allergies

## 2024-11-22 NOTE — PROGRESS NOTES
SURGERY SCHEDULING SHEET    Carolina Allison  7/7/1965  [id number]    Procedure: Right anterior total hip arthroplasty    CPT: 54276    Diagnosis: Right hip osteoarthritis    Anesthesia: Spinal    Antibiotics: IV Ancef    TXA: IV     Anticoagulation: ASA    Length of Surgery: 3 hours    Disposition: Outpatient in a Bed    Instruments: Ana JAKI - Trident II with Insignia Stem    Assist: Ask for Wesley Gold ([phone]) first assist. If Wesley unavailable, then please contact Francois Torres for first assist. If Wesley and Francois unavailable, then contact [facility] for first assist.    Pre-op Testing: CBC, CMP, PT/INR, PTT, TYPE AND SCREEN, and MRSA/MSSA THROUGH [facility] PAT    Clearance: MEDICAL/PCP    Post op: 2 weeks with Dr Mckeon    Surgery date specifics: Next available    Charles Mckeon MD  Adult Reconstruction    Department of Orthopaedic Surgery  [facility]     [address] [place] [address]  [address] [place] [address]     t: [phone]  f: [phone]       [facility]

## 2024-11-25 NOTE — TELEPHONE ENCOUNTER
Called and spoke with Carolina in regards to getting her scheduled for surgery. I did discuss optional surgical dates with her as she has decided to proceed with surgery on 2/19/25.     I confirmed that surgery will take place at [facility]. I also asked if she would like to discuss  the surgical protocol now or closer to her surgical date. She would like to discuss this closer to her surgical date. However I was able to get her scheduled for her post op appt. She states understanding with no questions or concerns. Advised to call the office back if she has any questions or concerns.

## 2024-12-03 DIAGNOSIS — M54.50 CHRONIC MIDLINE LOW BACK PAIN WITHOUT SCIATICA: ICD-10-CM

## 2024-12-03 DIAGNOSIS — G89.29 CHRONIC MIDLINE LOW BACK PAIN WITHOUT SCIATICA: ICD-10-CM

## 2024-12-03 DIAGNOSIS — F98.8 ATTENTION DEFICIT DISORDER, UNSPECIFIED HYPERACTIVITY PRESENCE: ICD-10-CM

## 2024-12-03 DIAGNOSIS — M16.9 OSTEOARTHRITIS OF HIP, UNSPECIFIED LATERALITY, UNSPECIFIED OSTEOARTHRITIS TYPE: ICD-10-CM

## 2024-12-03 RX ORDER — TRAMADOL HYDROCHLORIDE 50 MG/1
50 TABLET ORAL EVERY 6 HOURS PRN
Qty: 100 TABLET | Refills: 0 | Status: SHIPPED | OUTPATIENT
Start: 2024-12-03

## 2024-12-03 RX ORDER — DEXTROAMPHETAMINE SACCHARATE, AMPHETAMINE ASPARTATE, DEXTROAMPHETAMINE SULFATE AND AMPHETAMINE SULFATE 1.25; 1.25; 1.25; 1.25 MG/1; MG/1; MG/1; MG/1
5 TABLET ORAL DAILY
Qty: 30 TABLET | Refills: 0 | Status: SHIPPED | OUTPATIENT
Start: 2024-12-03

## 2024-12-03 RX ORDER — DEXTROAMPHETAMINE SACCHARATE, AMPHETAMINE ASPARTATE MONOHYDRATE, DEXTROAMPHETAMINE SULFATE AND AMPHETAMINE SULFATE 7.5; 7.5; 7.5; 7.5 MG/1; MG/1; MG/1; MG/1
30 CAPSULE, EXTENDED RELEASE ORAL EVERY MORNING
Qty: 30 CAPSULE | Refills: 0 | Status: SHIPPED | OUTPATIENT
Start: 2024-12-03

## 2024-12-03 RX ORDER — ATORVASTATIN CALCIUM 10 MG/1
10 TABLET, FILM COATED ORAL DAILY
Qty: 90 TABLET | Refills: 3 | OUTPATIENT
Start: 2024-12-03

## 2024-12-12 DIAGNOSIS — E78.5 HYPERLIPIDEMIA, UNSPECIFIED HYPERLIPIDEMIA TYPE: ICD-10-CM

## 2024-12-12 RX ORDER — ATORVASTATIN CALCIUM 20 MG/1
20 TABLET, FILM COATED ORAL NIGHTLY
Qty: 90 TABLET | Refills: 1 | Status: SHIPPED | OUTPATIENT
Start: 2024-12-12

## 2024-12-27 DIAGNOSIS — E03.9 HYPOTHYROIDISM, UNSPECIFIED TYPE: Primary | ICD-10-CM

## 2024-12-27 RX ORDER — LEVOTHYROXINE SODIUM 25 UG/1
25 TABLET ORAL
Qty: 90 TABLET | Refills: 0 | Status: SHIPPED | OUTPATIENT
Start: 2024-12-27

## 2024-12-27 NOTE — TELEPHONE ENCOUNTER
Please let patient or caregiver know or leave message that refill request for the medication/s listed below has/have come to our office. The refills have been sent.    She is due to check her lipids and thyroid labs as we had adjusted medication about 3 months ago. Orders have been placed. She can do the labs at one of our reference labs.    Thanks     Requested Prescriptions     Signed Prescriptions Disp Refills    levothyroxine 25 MCG Oral Tab 90 tablet 0     Sig: TAKE 1 TABLET BY MOUTH BEFORE BREAKFAST.     Authorizing Provider: MAXIMUS DAVIS

## 2025-01-05 DIAGNOSIS — M54.50 CHRONIC MIDLINE LOW BACK PAIN WITHOUT SCIATICA: ICD-10-CM

## 2025-01-05 DIAGNOSIS — F98.8 ATTENTION DEFICIT DISORDER, UNSPECIFIED HYPERACTIVITY PRESENCE: ICD-10-CM

## 2025-01-05 DIAGNOSIS — G89.29 CHRONIC MIDLINE LOW BACK PAIN WITHOUT SCIATICA: ICD-10-CM

## 2025-01-05 DIAGNOSIS — M16.9 OSTEOARTHRITIS OF HIP, UNSPECIFIED LATERALITY, UNSPECIFIED OSTEOARTHRITIS TYPE: ICD-10-CM

## 2025-01-06 RX ORDER — DEXTROAMPHETAMINE SACCHARATE, AMPHETAMINE ASPARTATE MONOHYDRATE, DEXTROAMPHETAMINE SULFATE AND AMPHETAMINE SULFATE 7.5; 7.5; 7.5; 7.5 MG/1; MG/1; MG/1; MG/1
30 CAPSULE, EXTENDED RELEASE ORAL EVERY MORNING
Qty: 30 CAPSULE | Refills: 0 | Status: SHIPPED | OUTPATIENT
Start: 2025-01-06

## 2025-01-06 RX ORDER — DEXTROAMPHETAMINE SACCHARATE, AMPHETAMINE ASPARTATE, DEXTROAMPHETAMINE SULFATE AND AMPHETAMINE SULFATE 1.25; 1.25; 1.25; 1.25 MG/1; MG/1; MG/1; MG/1
5 TABLET ORAL DAILY
Qty: 30 TABLET | Refills: 0 | Status: SHIPPED | OUTPATIENT
Start: 2025-01-06

## 2025-01-06 RX ORDER — TRAMADOL HYDROCHLORIDE 50 MG/1
50 TABLET ORAL EVERY 6 HOURS PRN
Qty: 100 TABLET | Refills: 0 | Status: SHIPPED | OUTPATIENT
Start: 2025-01-06

## 2025-01-09 ENCOUNTER — TELEPHONE (OUTPATIENT)
Dept: ORTHOPEDICS CLINIC | Facility: CLINIC | Age: 60
End: 2025-01-09

## 2025-01-09 NOTE — TELEPHONE ENCOUNTER
Patient called and needs surgery clearance forms sent to MD     Phone [phone]   [facility]

## 2025-01-09 NOTE — TELEPHONE ENCOUNTER
Called and spoke with Carolina in regards to her medical clearance. I did explain to her that the information in regards to her clearance was faxed over to Dr. Herrera office. She states that she would be going to another clinic to have her clearance completed and did not know who she would be seeing for medical clearance. I did advise that she give their office a call back to get info from their office in regards to whom she would see and their office information so that I am able to fax her surgical information out to their office. She states that she would give their office a call and call me back.

## 2025-01-10 ENCOUNTER — PATIENT MESSAGE (OUTPATIENT)
Dept: ORTHOPEDICS CLINIC | Facility: CLINIC | Age: 60
End: 2025-01-10

## 2025-01-10 DIAGNOSIS — Z01.818 PREOPERATIVE EXAMINATION: Primary | ICD-10-CM

## 2025-01-16 ENCOUNTER — E-VISIT (OUTPATIENT)
Dept: TELEHEALTH | Age: 60
End: 2025-01-16
Payer: COMMERCIAL

## 2025-01-16 DIAGNOSIS — J06.9 ACUTE URI: Primary | ICD-10-CM

## 2025-01-16 DIAGNOSIS — J34.89 SINUS PAIN: ICD-10-CM

## 2025-01-16 NOTE — PROGRESS NOTES
Carolina Allison is a 59 year old female who initiated e-visit care today.    HPI:   See answers to questionnaire submission     Current Outpatient Medications   Medication Sig Dispense Refill    levothyroxine 25 MCG Oral Tab TAKE 1 TABLET BY MOUTH BEFORE BREAKFAST. 90 tablet 0    traMADol 50 MG Oral Tab Take 1 tablet (50 mg total) by mouth every 6 (six) hours as needed for Pain. 100 tablet 0    amphetamine-dextroamphetamine (ADDERALL) 5 MG Oral Tab Take 1 tablet (5 mg total) by mouth daily. 30 tablet 0    amphetamine-dextroamphetamine ER (ADDERALL XR) 30 MG Oral Capsule SR 24 Hr Take 1 capsule (30 mg total) by mouth every morning. 30 capsule 0    atorvastatin 20 MG Oral Tab Take 1 tablet (20 mg total) by mouth nightly. 90 tablet 1    losartan 25 MG Oral Tab Take 2 tablets (50 mg total) by mouth daily. 90 tablet 3    LEVOTHYROXINE 200 MCG Oral Tab TAKE 1 TABLET DAILY 90 tablet 3    CITALOPRAM 40 MG Oral Tab TAKE 1 TABLET DAILY 90 tablet 3    hydroCHLOROthiazide 12.5 MG Oral Tab Take 1 tablet (12.5 mg total) by mouth daily. 90 tablet 5    buPROPion  MG Oral Tablet 24 Hr Take 1 tablet (150 mg total) by mouth every morning. 90 tablet 1    albuterol 108 (90 Base) MCG/ACT Inhalation Aero Soln Inhale 2 puffs into the lungs every 4 (four) hours as needed for Wheezing. 25.5 g 0    Clindamycin Phos-Benzoyl Perox 1-5 % External Gel Apply 1 Application topically 2 (two) times daily. 150 g 3      Past Medical History:    Acne rosacea    ADD (attention deficit disorder)    Chronic midline low back pain without sciatica    Depression    Hyperlipidemia    Hypothyroid    Osteoarthritis    lumbar, R hip    Primary hypertension    Reactive airway disease (HCC)    Visual impairment    reading glasses      Past Surgical History:   Procedure Laterality Date                X2    Colonoscopy N/A 2017    Procedure: COLONOSCOPY, POSSIBLE BIOPSY, POSSIBLE POLYPECTOMY 12417;  Surgeon: Noé Suárez DO;  Location:  [facility]    Hip arthroplasty Left     So biopsy stereo nodule 2 site bilat (cpt=19081/15813)      Other surgical history      partial thyroidectomy    Other surgical history      BACK SURGERY    Other surgical history      FEET SURGERY      Family History   Problem Relation Age of Onset    Heart Disorder Father     Hypertension Mother       Social History:  Social History     Socioeconomic History    Marital status:    Tobacco Use    Smoking status: Every Day     Current packs/day: 1.00     Average packs/day: 1 pack/day for 20.0 years (20.0 ttl pk-yrs)     Types: Cigarettes    Smokeless tobacco: Never    Tobacco comments:     1/2 ppd      Updated 11/15/24   Vaping Use    Vaping status: Never Used   Substance and Sexual Activity    Alcohol use: Not Currently     Comment: social    Drug use: No   Social History Narrative    ** Merged History Encounter **          Social Drivers of Health      [facility]    Social Connections    Received from [facility], [facility]    Housing Stability         ASSESSMENT AND PLAN:       Diagnoses and all orders for this visit:    Acute URI    Sinus pain     Ill 1 day. Discussed viral URIs/sinus infections.  Comfort care. Follow up if sx persisting without improvement in 7-10 days.  Advised home COVID test as well.       Duration of  the service:  8 minutes      See [employer] message exchange and Patient Instructions for Comfort Care and patient education.

## 2025-01-20 RX ORDER — BUPROPION HYDROCHLORIDE 150 MG/1
150 TABLET ORAL EVERY MORNING
Qty: 90 TABLET | Refills: 2 | Status: SHIPPED | OUTPATIENT
Start: 2025-01-20

## 2025-01-27 ENCOUNTER — LAB ENCOUNTER (OUTPATIENT)
Dept: LAB | Age: 60
End: 2025-01-27
Attending: FAMILY MEDICINE
Payer: COMMERCIAL

## 2025-01-27 DIAGNOSIS — E78.5 HYPERLIPIDEMIA, UNSPECIFIED HYPERLIPIDEMIA TYPE: ICD-10-CM

## 2025-01-27 DIAGNOSIS — Z01.818 PREOPERATIVE EXAMINATION: ICD-10-CM

## 2025-01-27 DIAGNOSIS — E03.9 HYPOTHYROIDISM, UNSPECIFIED TYPE: ICD-10-CM

## 2025-01-27 LAB
ALT SERPL-CCNC: 39 U/L
ANTIBODY SCREEN: NEGATIVE
AST SERPL-CCNC: 25 U/L (ref ?–34)
CHOLEST SERPL-MCNC: 219 MG/DL (ref ?–200)
FASTING PATIENT LIPID ANSWER: YES
HDLC SERPL-MCNC: 59 MG/DL (ref 40–59)
LDLC SERPL CALC-MCNC: 131 MG/DL (ref ?–100)
NONHDLC SERPL-MCNC: 160 MG/DL (ref ?–130)
RH BLOOD TYPE: POSITIVE
T4 FREE SERPL-MCNC: 1.9 NG/DL (ref 0.8–1.7)
TRIGL SERPL-MCNC: 164 MG/DL (ref 30–149)
TSI SER-ACNC: 0.96 UIU/ML (ref 0.55–4.78)
VLDLC SERPL CALC-MCNC: 30 MG/DL (ref 0–30)

## 2025-01-27 PROCEDURE — 84450 TRANSFERASE (AST) (SGOT): CPT | Performed by: FAMILY MEDICINE

## 2025-01-27 PROCEDURE — 84460 ALANINE AMINO (ALT) (SGPT): CPT | Performed by: FAMILY MEDICINE

## 2025-01-27 PROCEDURE — 86901 BLOOD TYPING SEROLOGIC RH(D): CPT | Performed by: STUDENT IN AN ORGANIZED HEALTH CARE EDUCATION/TRAINING PROGRAM

## 2025-01-27 PROCEDURE — 86900 BLOOD TYPING SEROLOGIC ABO: CPT | Performed by: STUDENT IN AN ORGANIZED HEALTH CARE EDUCATION/TRAINING PROGRAM

## 2025-01-27 PROCEDURE — 86850 RBC ANTIBODY SCREEN: CPT | Performed by: STUDENT IN AN ORGANIZED HEALTH CARE EDUCATION/TRAINING PROGRAM

## 2025-01-27 PROCEDURE — 84443 ASSAY THYROID STIM HORMONE: CPT | Performed by: FAMILY MEDICINE

## 2025-01-27 PROCEDURE — 84439 ASSAY OF FREE THYROXINE: CPT | Performed by: FAMILY MEDICINE

## 2025-01-27 PROCEDURE — 80061 LIPID PANEL: CPT | Performed by: FAMILY MEDICINE

## 2025-01-27 NOTE — TELEPHONE ENCOUNTER
Called and spoke with Carolina in regards to her upcoming surgery.     I confirmed that surgery will take place at [facility]. I also discussed the surgical protocol. Reminded her that she will need to see her pcp for surgical clearance.She states understanding with no questions or concerns. Advised to call the office back if she has any questions or concerns.

## 2025-01-28 ENCOUNTER — PATIENT MESSAGE (OUTPATIENT)
Dept: FAMILY MEDICINE CLINIC | Facility: CLINIC | Age: 60
End: 2025-01-28

## 2025-01-28 DIAGNOSIS — E78.5 HYPERLIPIDEMIA, UNSPECIFIED HYPERLIPIDEMIA TYPE: ICD-10-CM

## 2025-01-28 DIAGNOSIS — E03.9 HYPOTHYROIDISM, UNSPECIFIED TYPE: Primary | ICD-10-CM

## 2025-01-28 RX ORDER — ATORVASTATIN CALCIUM 40 MG/1
40 TABLET, FILM COATED ORAL NIGHTLY
Qty: 90 TABLET | Refills: 0 | Status: SHIPPED | OUTPATIENT
Start: 2025-01-28

## 2025-01-30 ENCOUNTER — LABORATORY ENCOUNTER (OUTPATIENT)
Dept: LAB | Age: 60
End: 2025-01-30
Attending: STUDENT IN AN ORGANIZED HEALTH CARE EDUCATION/TRAINING PROGRAM
Payer: COMMERCIAL

## 2025-01-30 DIAGNOSIS — M16.11 PRIMARY OSTEOARTHRITIS OF RIGHT HIP: ICD-10-CM

## 2025-01-30 DIAGNOSIS — I10 HYPERTENSION: ICD-10-CM

## 2025-01-30 DIAGNOSIS — Z01.818 PRE-OP TESTING: ICD-10-CM

## 2025-01-30 LAB
ALBUMIN SERPL-MCNC: 4.7 G/DL (ref 3.2–4.8)
ALBUMIN/GLOB SERPL: 1.8 {RATIO} (ref 1–2)
ALP LIVER SERPL-CCNC: 104 U/L
ALT SERPL-CCNC: 36 U/L
ANION GAP SERPL CALC-SCNC: 6 MMOL/L (ref 0–18)
APTT PPP: 26.1 SECONDS (ref 23–36)
AST SERPL-CCNC: 31 U/L (ref ?–34)
BASOPHILS # BLD AUTO: 0.06 X10(3) UL (ref 0–0.2)
BASOPHILS NFR BLD AUTO: 0.9 %
BILIRUB SERPL-MCNC: 1.1 MG/DL (ref 0.2–1.1)
BUN BLD-MCNC: 14 MG/DL (ref 9–23)
CALCIUM BLD-MCNC: 9.6 MG/DL (ref 8.7–10.6)
CHLORIDE SERPL-SCNC: 106 MMOL/L (ref 98–112)
CO2 SERPL-SCNC: 28 MMOL/L (ref 21–32)
CREAT BLD-MCNC: 0.84 MG/DL
EGFRCR SERPLBLD CKD-EPI 2021: 79 ML/MIN/1.73M2 (ref 60–?)
EOSINOPHIL # BLD AUTO: 0.28 X10(3) UL (ref 0–0.7)
EOSINOPHIL NFR BLD AUTO: 4.3 %
ERYTHROCYTE [DISTWIDTH] IN BLOOD BY AUTOMATED COUNT: 14 %
FASTING STATUS PATIENT QL REPORTED: YES
GLOBULIN PLAS-MCNC: 2.6 G/DL (ref 2–3.5)
GLUCOSE BLD-MCNC: 103 MG/DL (ref 70–99)
HCT VFR BLD AUTO: 44.1 %
HGB BLD-MCNC: 14.6 G/DL
IMM GRANULOCYTES # BLD AUTO: 0.04 X10(3) UL (ref 0–1)
IMM GRANULOCYTES NFR BLD: 0.6 %
INR BLD: 0.94 (ref 0.8–1.2)
LYMPHOCYTES # BLD AUTO: 2.54 X10(3) UL (ref 1–4)
LYMPHOCYTES NFR BLD AUTO: 39 %
MCH RBC QN AUTO: 31.5 PG (ref 26–34)
MCHC RBC AUTO-ENTMCNC: 33.1 G/DL (ref 31–37)
MCV RBC AUTO: 95 FL
MONOCYTES # BLD AUTO: 0.5 X10(3) UL (ref 0.1–1)
MONOCYTES NFR BLD AUTO: 7.7 %
NEUTROPHILS # BLD AUTO: 3.09 X10 (3) UL (ref 1.5–7.7)
NEUTROPHILS # BLD AUTO: 3.09 X10(3) UL (ref 1.5–7.7)
NEUTROPHILS NFR BLD AUTO: 47.5 %
OSMOLALITY SERPL CALC.SUM OF ELEC: 291 MOSM/KG (ref 275–295)
PLATELET # BLD AUTO: 367 10(3)UL (ref 150–450)
POTASSIUM SERPL-SCNC: 4.8 MMOL/L (ref 3.5–5.1)
PROT SERPL-MCNC: 7.3 G/DL (ref 5.7–8.2)
PROTHROMBIN TIME: 12.6 SECONDS (ref 11.6–14.8)
RBC # BLD AUTO: 4.64 X10(6)UL
SODIUM SERPL-SCNC: 140 MMOL/L (ref 136–145)
WBC # BLD AUTO: 6.5 X10(3) UL (ref 4–11)

## 2025-01-30 PROCEDURE — 36415 COLL VENOUS BLD VENIPUNCTURE: CPT

## 2025-01-30 PROCEDURE — 87081 CULTURE SCREEN ONLY: CPT

## 2025-01-30 PROCEDURE — 80053 COMPREHEN METABOLIC PANEL: CPT

## 2025-01-30 PROCEDURE — 85025 COMPLETE CBC W/AUTO DIFF WBC: CPT

## 2025-01-30 PROCEDURE — 85610 PROTHROMBIN TIME: CPT

## 2025-01-30 PROCEDURE — 85730 THROMBOPLASTIN TIME PARTIAL: CPT

## 2025-02-04 ENCOUNTER — EKG ENCOUNTER (OUTPATIENT)
Dept: LAB | Age: 60
End: 2025-02-04
Attending: STUDENT IN AN ORGANIZED HEALTH CARE EDUCATION/TRAINING PROGRAM
Payer: COMMERCIAL

## 2025-02-04 DIAGNOSIS — M16.9 OSTEOARTHRITIS OF HIP, UNSPECIFIED LATERALITY, UNSPECIFIED OSTEOARTHRITIS TYPE: ICD-10-CM

## 2025-02-04 DIAGNOSIS — M54.50 CHRONIC MIDLINE LOW BACK PAIN WITHOUT SCIATICA: ICD-10-CM

## 2025-02-04 DIAGNOSIS — G89.29 CHRONIC MIDLINE LOW BACK PAIN WITHOUT SCIATICA: ICD-10-CM

## 2025-02-04 DIAGNOSIS — Z01.818 PRE-OP TESTING: ICD-10-CM

## 2025-02-04 LAB
ATRIAL RATE: 80 BPM
P AXIS: 56 DEGREES
P-R INTERVAL: 156 MS
Q-T INTERVAL: 388 MS
QRS DURATION: 96 MS
QTC CALCULATION (BEZET): 447 MS
R AXIS: 16 DEGREES
T AXIS: 51 DEGREES
VENTRICULAR RATE: 80 BPM

## 2025-02-04 PROCEDURE — 93005 ELECTROCARDIOGRAM TRACING: CPT

## 2025-02-04 PROCEDURE — 93010 ELECTROCARDIOGRAM REPORT: CPT | Performed by: INTERNAL MEDICINE

## 2025-02-04 RX ORDER — TRAMADOL HYDROCHLORIDE 50 MG/1
50 TABLET ORAL EVERY 6 HOURS PRN
Qty: 100 TABLET | Refills: 0 | Status: SHIPPED | OUTPATIENT
Start: 2025-02-04

## 2025-02-04 RX ORDER — CLINDAMYCIN AND BENZOYL PEROXIDE 10; 50 MG/G; MG/G
1 GEL TOPICAL 2 TIMES DAILY
Qty: 50 G | Refills: 6 | Status: SHIPPED | OUTPATIENT
Start: 2025-02-04

## 2025-02-05 ENCOUNTER — TELEPHONE (OUTPATIENT)
Facility: CLINIC | Age: 60
End: 2025-02-05

## 2025-02-05 ENCOUNTER — TELEPHONE (OUTPATIENT)
Dept: FAMILY MEDICINE CLINIC | Facility: CLINIC | Age: 60
End: 2025-02-05

## 2025-02-05 NOTE — TELEPHONE ENCOUNTER
She'll need to make an appointment with me. The other option is to see Cardiology.  [facility], P: [phone]   Thanks so much

## 2025-02-05 NOTE — TELEPHONE ENCOUNTER
Patient scheduled for pre-op clearance at [facility] today.  They called to state that patient is not cleared due to abnormal EKG.  Patient PCP was contacted by [facility] to be advised that patient needs a cardiologist and cardiac clearance before 2/19 SX with Dr. Mckeon.     [facility] ph. [phone].     Please reach out to patient though if needed.

## 2025-02-05 NOTE — TELEPHONE ENCOUNTER
Patient is at [facility] getting pre-op clearance for upcoming hip surgery    Patient is being told to contact pcp to review recent EKG as cardiac clearance may be necessary    Please adv  Thank you

## 2025-02-05 NOTE — TELEPHONE ENCOUNTER
Patient's name and  verified   Patient is seeing cardiology tomorrow in [place] through [facility].   Patient notified and verbalized an understanding

## 2025-02-13 DIAGNOSIS — F98.8 ATTENTION DEFICIT DISORDER, UNSPECIFIED HYPERACTIVITY PRESENCE: ICD-10-CM

## 2025-02-13 RX ORDER — DEXTROAMPHETAMINE SACCHARATE, AMPHETAMINE ASPARTATE MONOHYDRATE, DEXTROAMPHETAMINE SULFATE AND AMPHETAMINE SULFATE 7.5; 7.5; 7.5; 7.5 MG/1; MG/1; MG/1; MG/1
30 CAPSULE, EXTENDED RELEASE ORAL EVERY MORNING
Qty: 30 CAPSULE | Refills: 0 | Status: SHIPPED | OUTPATIENT
Start: 2025-02-13

## 2025-02-13 RX ORDER — DEXTROAMPHETAMINE SACCHARATE, AMPHETAMINE ASPARTATE, DEXTROAMPHETAMINE SULFATE AND AMPHETAMINE SULFATE 1.25; 1.25; 1.25; 1.25 MG/1; MG/1; MG/1; MG/1
5 TABLET ORAL DAILY
Qty: 30 TABLET | Refills: 0 | Status: SHIPPED | OUTPATIENT
Start: 2025-02-13

## 2025-02-26 DIAGNOSIS — I10 PRIMARY HYPERTENSION: ICD-10-CM

## 2025-02-26 RX ORDER — LOSARTAN POTASSIUM 25 MG/1
50 TABLET ORAL DAILY
Qty: 90 TABLET | Refills: 1 | Status: SHIPPED | OUTPATIENT
Start: 2025-02-26 | End: 2025-02-28

## 2025-02-26 NOTE — TELEPHONE ENCOUNTER
Received fax refill request for 90-day supply from [facility].     Received prescription refill request for Levothyroxine 25 MCG but I am only seeing the Levothyroxine 200 MCG on her current medications list. You might have went up in dosing for that one, I did not pend it. If appropriate please pend and sign. Please advise, thank you.

## 2025-02-28 DIAGNOSIS — I10 PRIMARY HYPERTENSION: ICD-10-CM

## 2025-02-28 RX ORDER — LOSARTAN POTASSIUM 25 MG/1
25 TABLET ORAL DAILY
Qty: 90 TABLET | Refills: 3 | Status: SHIPPED | OUTPATIENT
Start: 2025-02-28

## 2025-03-02 ENCOUNTER — PATIENT MESSAGE (OUTPATIENT)
Dept: ORTHOPEDICS CLINIC | Facility: CLINIC | Age: 60
End: 2025-03-02

## 2025-03-03 RX ORDER — MELOXICAM 15 MG/1
15 TABLET ORAL DAILY
Qty: 30 TABLET | Refills: 0 | Status: SHIPPED | OUTPATIENT
Start: 2025-03-03 | End: 2025-04-02

## 2025-03-05 DIAGNOSIS — G89.29 CHRONIC MIDLINE LOW BACK PAIN WITHOUT SCIATICA: ICD-10-CM

## 2025-03-05 DIAGNOSIS — M54.50 CHRONIC MIDLINE LOW BACK PAIN WITHOUT SCIATICA: ICD-10-CM

## 2025-03-05 DIAGNOSIS — M16.9 OSTEOARTHRITIS OF HIP, UNSPECIFIED LATERALITY, UNSPECIFIED OSTEOARTHRITIS TYPE: ICD-10-CM

## 2025-03-06 RX ORDER — TRAMADOL HYDROCHLORIDE 50 MG/1
50 TABLET ORAL EVERY 6 HOURS PRN
Qty: 100 TABLET | Refills: 0 | Status: SHIPPED | OUTPATIENT
Start: 2025-03-06

## 2025-03-17 RX ORDER — ALBUTEROL SULFATE 90 UG/1
2 INHALANT RESPIRATORY (INHALATION) EVERY 4 HOURS PRN
Qty: 3 EACH | Refills: 3 | Status: SHIPPED | OUTPATIENT
Start: 2025-03-17

## 2025-03-22 ENCOUNTER — LABORATORY ENCOUNTER (OUTPATIENT)
Dept: LAB | Age: 60
End: 2025-03-22
Attending: STUDENT IN AN ORGANIZED HEALTH CARE EDUCATION/TRAINING PROGRAM
Payer: COMMERCIAL

## 2025-03-22 DIAGNOSIS — F98.8 ATTENTION DEFICIT DISORDER, UNSPECIFIED HYPERACTIVITY PRESENCE: ICD-10-CM

## 2025-03-22 DIAGNOSIS — Z01.818 PRE-OP TESTING: ICD-10-CM

## 2025-03-22 LAB
ANTIBODY SCREEN: NEGATIVE
APTT PPP: 27.1 SECONDS (ref 23–36)
INR BLD: 0.87 (ref 0.8–1.2)
PROTHROMBIN TIME: 12.4 SECONDS (ref 11.6–14.8)
RH BLOOD TYPE: POSITIVE

## 2025-03-22 PROCEDURE — 86850 RBC ANTIBODY SCREEN: CPT | Performed by: STUDENT IN AN ORGANIZED HEALTH CARE EDUCATION/TRAINING PROGRAM

## 2025-03-22 PROCEDURE — 85730 THROMBOPLASTIN TIME PARTIAL: CPT | Performed by: STUDENT IN AN ORGANIZED HEALTH CARE EDUCATION/TRAINING PROGRAM

## 2025-03-22 PROCEDURE — 86900 BLOOD TYPING SEROLOGIC ABO: CPT | Performed by: STUDENT IN AN ORGANIZED HEALTH CARE EDUCATION/TRAINING PROGRAM

## 2025-03-22 PROCEDURE — 85610 PROTHROMBIN TIME: CPT | Performed by: STUDENT IN AN ORGANIZED HEALTH CARE EDUCATION/TRAINING PROGRAM

## 2025-03-22 PROCEDURE — 87081 CULTURE SCREEN ONLY: CPT | Performed by: STUDENT IN AN ORGANIZED HEALTH CARE EDUCATION/TRAINING PROGRAM

## 2025-03-22 PROCEDURE — 86901 BLOOD TYPING SEROLOGIC RH(D): CPT | Performed by: STUDENT IN AN ORGANIZED HEALTH CARE EDUCATION/TRAINING PROGRAM

## 2025-03-22 RX ORDER — DEXTROAMPHETAMINE SACCHARATE, AMPHETAMINE ASPARTATE MONOHYDRATE, DEXTROAMPHETAMINE SULFATE AND AMPHETAMINE SULFATE 7.5; 7.5; 7.5; 7.5 MG/1; MG/1; MG/1; MG/1
30 CAPSULE, EXTENDED RELEASE ORAL EVERY MORNING
Qty: 30 CAPSULE | Refills: 0 | Status: SHIPPED | OUTPATIENT
Start: 2025-03-22 | End: 2025-05-04

## 2025-03-22 RX ORDER — DEXTROAMPHETAMINE SACCHARATE, AMPHETAMINE ASPARTATE, DEXTROAMPHETAMINE SULFATE AND AMPHETAMINE SULFATE 1.25; 1.25; 1.25; 1.25 MG/1; MG/1; MG/1; MG/1
5 TABLET ORAL DAILY
Qty: 30 TABLET | Refills: 0 | Status: SHIPPED | OUTPATIENT
Start: 2025-03-22 | End: 2025-05-04

## 2025-03-22 NOTE — TELEPHONE ENCOUNTER
Please let patient or caregiver know or leave message that refill request for the medication/s listed below has/have come to our office. The refills have been sent.    She is due for the 6 month f/u on this medication. Please help her schedule a video visit in the next few weeks.    Thanks     Requested Prescriptions     Signed Prescriptions Disp Refills    amphetamine-dextroamphetamine (ADDERALL) 5 MG Oral Tab 30 tablet 0     Sig: Take 1 tablet (5 mg total) by mouth daily.     Authorizing Provider: MAXIMUS DAVIS    amphetamine-dextroamphetamine ER (ADDERALL XR) 30 MG Oral Capsule SR 24 Hr 30 capsule 0     Sig: Take 1 capsule (30 mg total) by mouth every morning.     Authorizing Provider: MAXIMUS DAVIS

## 2025-03-31 ENCOUNTER — ANESTHESIA EVENT (OUTPATIENT)
Dept: SURGERY | Facility: HOSPITAL | Age: 60
End: 2025-03-31
Payer: COMMERCIAL

## 2025-04-01 DIAGNOSIS — E03.9 HYPOTHYROIDISM, UNSPECIFIED TYPE: ICD-10-CM

## 2025-04-01 RX ORDER — LEVOTHYROXINE SODIUM 25 UG/1
25 TABLET ORAL
Qty: 30 TABLET | Refills: 2 | OUTPATIENT
Start: 2025-04-01

## 2025-04-02 NOTE — TELEPHONE ENCOUNTER
Meloxicam 15mg    DOS: 4/16/25  RIGHT ANTERIOR TOTAL HIP ARTHROPLASTY   Last OV: 11/22/24  Last refill date: 3/3/25 #/refills: 30/0  Upcoming appt:   Future Appointments   Date Time Provider Department Center   5/2/2025  9:40 AM Elaine Mckeon PA-C [facility]     Component      Latest Ref Rng 1/30/2025   Glucose      70 - 99 mg/dL 103 (H)    Sodium      136 - 145 mmol/L 140    Potassium      3.5 - 5.1 mmol/L 4.8    Chloride      98 - 112 mmol/L 106    Carbon Dioxide, Total      21.0 - 32.0 mmol/L 28.0    ANION GAP      0 - 18 mmol/L 6    BUN      9 - 23 mg/dL 14    CREATININE      0.55 - 1.02 mg/dL 0.84    CALCIUM      8.7 - 10.6 mg/dL 9.6    CALCULATED OSMOLALITY      275 - 295 mOsm/kg 291    EGFR      >=60 mL/min/1.73m2 79       Legend:  (H) High

## 2025-04-03 RX ORDER — MELOXICAM 15 MG/1
15 TABLET ORAL DAILY
Qty: 30 TABLET | Refills: 0 | Status: SHIPPED | OUTPATIENT
Start: 2025-04-03 | End: 2025-05-03

## 2025-04-08 DIAGNOSIS — M54.50 CHRONIC MIDLINE LOW BACK PAIN WITHOUT SCIATICA: ICD-10-CM

## 2025-04-08 DIAGNOSIS — G89.29 CHRONIC MIDLINE LOW BACK PAIN WITHOUT SCIATICA: ICD-10-CM

## 2025-04-08 DIAGNOSIS — M16.9 OSTEOARTHRITIS OF HIP, UNSPECIFIED LATERALITY, UNSPECIFIED OSTEOARTHRITIS TYPE: ICD-10-CM

## 2025-04-09 RX ORDER — ATORVASTATIN CALCIUM 20 MG/1
20 TABLET, FILM COATED ORAL NIGHTLY
COMMUNITY
Start: 2025-04-02 | End: 2025-04-11

## 2025-04-09 RX ORDER — TRAMADOL HYDROCHLORIDE 50 MG/1
50 TABLET ORAL EVERY 6 HOURS PRN
Qty: 100 TABLET | Refills: 0 | Status: SHIPPED | OUTPATIENT
Start: 2025-04-09

## 2025-04-11 RX ORDER — ATORVASTATIN CALCIUM 20 MG/1
20 TABLET, FILM COATED ORAL NIGHTLY
Qty: 90 TABLET | Refills: 0 | Status: SHIPPED | OUTPATIENT
Start: 2025-04-11

## 2025-04-16 ENCOUNTER — APPOINTMENT (OUTPATIENT)
Dept: GENERAL RADIOLOGY | Facility: HOSPITAL | Age: 60
End: 2025-04-16
Attending: STUDENT IN AN ORGANIZED HEALTH CARE EDUCATION/TRAINING PROGRAM
Payer: COMMERCIAL

## 2025-04-16 ENCOUNTER — APPOINTMENT (OUTPATIENT)
Dept: GENERAL RADIOLOGY | Facility: HOSPITAL | Age: 60
End: 2025-04-16
Payer: COMMERCIAL

## 2025-04-16 ENCOUNTER — HOSPITAL ENCOUNTER (OUTPATIENT)
Facility: HOSPITAL | Age: 60
Discharge: HOME HEALTH | End: 2025-04-17
Attending: STUDENT IN AN ORGANIZED HEALTH CARE EDUCATION/TRAINING PROGRAM | Admitting: STUDENT IN AN ORGANIZED HEALTH CARE EDUCATION/TRAINING PROGRAM
Payer: COMMERCIAL

## 2025-04-16 ENCOUNTER — ANESTHESIA (OUTPATIENT)
Dept: SURGERY | Facility: HOSPITAL | Age: 60
End: 2025-04-16
Payer: COMMERCIAL

## 2025-04-16 DIAGNOSIS — M16.11 PRIMARY OSTEOARTHRITIS OF RIGHT HIP: ICD-10-CM

## 2025-04-16 DIAGNOSIS — Z01.818 PRE-OP TESTING: Primary | ICD-10-CM

## 2025-04-16 PROCEDURE — 3008F BODY MASS INDEX DOCD: CPT | Performed by: HOSPITALIST

## 2025-04-16 PROCEDURE — 72170 X-RAY EXAM OF PELVIS: CPT

## 2025-04-16 PROCEDURE — 3079F DIAST BP 80-89 MM HG: CPT | Performed by: HOSPITALIST

## 2025-04-16 PROCEDURE — 3077F SYST BP >= 140 MM HG: CPT | Performed by: HOSPITALIST

## 2025-04-16 PROCEDURE — 76000 FLUOROSCOPY <1 HR PHYS/QHP: CPT | Performed by: STUDENT IN AN ORGANIZED HEALTH CARE EDUCATION/TRAINING PROGRAM

## 2025-04-16 PROCEDURE — 99203 OFFICE O/P NEW LOW 30 MIN: CPT | Performed by: HOSPITALIST

## 2025-04-16 DEVICE — HIP STEM - STANDARD OFFSET
Type: IMPLANTABLE DEVICE | Site: HIP | Status: FUNCTIONAL
Brand: INSIGNIA

## 2025-04-16 DEVICE — 6.5MM LOW PROFILE HEX SCREW 30MM
Type: IMPLANTABLE DEVICE | Site: HIP | Status: FUNCTIONAL
Brand: TRIDENT II

## 2025-04-16 DEVICE — 6.5MM LOW PROFILE HEX SCREW 20MM
Type: IMPLANTABLE DEVICE | Site: HIP | Status: FUNCTIONAL
Brand: TRIDENT II

## 2025-04-16 DEVICE — TRIDENT X3 0 DEGREE POLYETHYLENE INSERT
Type: IMPLANTABLE DEVICE | Site: HIP | Status: FUNCTIONAL
Brand: TRIDENT X3 INSERT

## 2025-04-16 DEVICE — CERAMIC V40 FEMORAL HEAD
Type: IMPLANTABLE DEVICE | Site: HIP | Status: FUNCTIONAL
Brand: BIOLOX

## 2025-04-16 DEVICE — TRIDENT II TRITANIUM CLUSTER 48D
Type: IMPLANTABLE DEVICE | Site: HIP | Status: FUNCTIONAL
Brand: TRIDENT II

## 2025-04-16 RX ORDER — TRAMADOL HYDROCHLORIDE 50 MG/1
50 TABLET ORAL EVERY 6 HOURS PRN
Status: DISCONTINUED | OUTPATIENT
Start: 2025-04-16 | End: 2025-04-17

## 2025-04-16 RX ORDER — ASPIRIN 81 MG/1
81 TABLET ORAL 2 TIMES DAILY
Qty: 60 TABLET | Refills: 0 | Status: SHIPPED | COMMUNITY
Start: 2025-04-16 | End: 2025-05-16

## 2025-04-16 RX ORDER — SODIUM CHLORIDE, SODIUM LACTATE, POTASSIUM CHLORIDE, CALCIUM CHLORIDE 600; 310; 30; 20 MG/100ML; MG/100ML; MG/100ML; MG/100ML
INJECTION, SOLUTION INTRAVENOUS CONTINUOUS
Status: DISCONTINUED | OUTPATIENT
Start: 2025-04-16 | End: 2025-04-16 | Stop reason: HOSPADM

## 2025-04-16 RX ORDER — HYDROCODONE BITARTRATE AND ACETAMINOPHEN 5; 325 MG/1; MG/1
2 TABLET ORAL ONCE AS NEEDED
Status: DISCONTINUED | OUTPATIENT
Start: 2025-04-16 | End: 2025-04-16 | Stop reason: HOSPADM

## 2025-04-16 RX ORDER — OXYCODONE HYDROCHLORIDE 5 MG/1
5 TABLET ORAL EVERY 4 HOURS PRN
Status: DISCONTINUED | OUTPATIENT
Start: 2025-04-16 | End: 2025-04-17

## 2025-04-16 RX ORDER — SENNOSIDES 8.6 MG
17.2 TABLET ORAL NIGHTLY
Status: DISCONTINUED | OUTPATIENT
Start: 2025-04-16 | End: 2025-04-17

## 2025-04-16 RX ORDER — HYDROMORPHONE HYDROCHLORIDE 1 MG/ML
0.2 INJECTION, SOLUTION INTRAMUSCULAR; INTRAVENOUS; SUBCUTANEOUS EVERY 2 HOUR PRN
Status: DISCONTINUED | OUTPATIENT
Start: 2025-04-16 | End: 2025-04-17

## 2025-04-16 RX ORDER — ONDANSETRON 2 MG/ML
INJECTION INTRAMUSCULAR; INTRAVENOUS AS NEEDED
Status: DISCONTINUED | OUTPATIENT
Start: 2025-04-16 | End: 2025-04-16 | Stop reason: SURG

## 2025-04-16 RX ORDER — ONDANSETRON 2 MG/ML
4 INJECTION INTRAMUSCULAR; INTRAVENOUS EVERY 6 HOURS PRN
Status: DISCONTINUED | OUTPATIENT
Start: 2025-04-16 | End: 2025-04-16 | Stop reason: HOSPADM

## 2025-04-16 RX ORDER — HYDROCODONE BITARTRATE AND ACETAMINOPHEN 5; 325 MG/1; MG/1
1 TABLET ORAL ONCE AS NEEDED
Status: DISCONTINUED | OUTPATIENT
Start: 2025-04-16 | End: 2025-04-16 | Stop reason: HOSPADM

## 2025-04-16 RX ORDER — LOSARTAN POTASSIUM 25 MG/1
50 TABLET ORAL DAILY
COMMUNITY

## 2025-04-16 RX ORDER — DIPHENHYDRAMINE HYDROCHLORIDE 50 MG/ML
25 INJECTION, SOLUTION INTRAMUSCULAR; INTRAVENOUS ONCE AS NEEDED
Status: ACTIVE | OUTPATIENT
Start: 2025-04-16 | End: 2025-04-16

## 2025-04-16 RX ORDER — DOCUSATE SODIUM 100 MG/1
100 CAPSULE, LIQUID FILLED ORAL 2 TIMES DAILY
Status: DISCONTINUED | OUTPATIENT
Start: 2025-04-16 | End: 2025-04-17

## 2025-04-16 RX ORDER — ACETAMINOPHEN 500 MG
1000 TABLET ORAL EVERY 6 HOURS PRN
Status: ON HOLD | COMMUNITY
End: 2025-04-16 | Stop reason: CLARIF

## 2025-04-16 RX ORDER — ALBUTEROL SULFATE 90 UG/1
2 INHALANT RESPIRATORY (INHALATION) EVERY 4 HOURS PRN
Status: DISCONTINUED | OUTPATIENT
Start: 2025-04-16 | End: 2025-04-17

## 2025-04-16 RX ORDER — ACETAMINOPHEN 500 MG
1000 TABLET ORAL ONCE AS NEEDED
Status: DISCONTINUED | OUTPATIENT
Start: 2025-04-16 | End: 2025-04-16 | Stop reason: HOSPADM

## 2025-04-16 RX ORDER — HYDROMORPHONE HYDROCHLORIDE 1 MG/ML
0.4 INJECTION, SOLUTION INTRAMUSCULAR; INTRAVENOUS; SUBCUTANEOUS EVERY 2 HOUR PRN
Status: DISCONTINUED | OUTPATIENT
Start: 2025-04-16 | End: 2025-04-17

## 2025-04-16 RX ORDER — OXYCODONE HYDROCHLORIDE 5 MG/1
5 TABLET ORAL
Qty: 40 TABLET | Refills: 0 | Status: SHIPPED | OUTPATIENT
Start: 2025-04-16

## 2025-04-16 RX ORDER — PROCHLORPERAZINE EDISYLATE 5 MG/ML
5 INJECTION INTRAMUSCULAR; INTRAVENOUS EVERY 8 HOURS PRN
Status: DISCONTINUED | OUTPATIENT
Start: 2025-04-16 | End: 2025-04-16 | Stop reason: HOSPADM

## 2025-04-16 RX ORDER — POLYETHYLENE GLYCOL 3350 17 G/17G
17 POWDER, FOR SOLUTION ORAL DAILY PRN
Status: DISCONTINUED | OUTPATIENT
Start: 2025-04-16 | End: 2025-04-17

## 2025-04-16 RX ORDER — POLYETHYLENE GLYCOL 3350 17 G/17G
17 POWDER, FOR SOLUTION ORAL DAILY PRN
Qty: 14 EACH | Refills: 0 | Status: SHIPPED | OUTPATIENT
Start: 2025-04-16 | End: 2025-04-30

## 2025-04-16 RX ORDER — ATORVASTATIN CALCIUM 20 MG/1
20 TABLET, FILM COATED ORAL NIGHTLY
Status: DISCONTINUED | OUTPATIENT
Start: 2025-04-16 | End: 2025-04-17

## 2025-04-16 RX ORDER — ALBUTEROL SULFATE 0.83 MG/ML
2.5 SOLUTION RESPIRATORY (INHALATION) AS NEEDED
Status: DISCONTINUED | OUTPATIENT
Start: 2025-04-16 | End: 2025-04-16 | Stop reason: HOSPADM

## 2025-04-16 RX ORDER — HYDROMORPHONE HYDROCHLORIDE 1 MG/ML
0.8 INJECTION, SOLUTION INTRAMUSCULAR; INTRAVENOUS; SUBCUTANEOUS EVERY 2 HOUR PRN
Status: DISCONTINUED | OUTPATIENT
Start: 2025-04-16 | End: 2025-04-17

## 2025-04-16 RX ORDER — DIPHENHYDRAMINE HYDROCHLORIDE 50 MG/ML
12.5 INJECTION, SOLUTION INTRAMUSCULAR; INTRAVENOUS EVERY 4 HOURS PRN
Status: DISCONTINUED | OUTPATIENT
Start: 2025-04-16 | End: 2025-04-17

## 2025-04-16 RX ORDER — ACETAMINOPHEN 325 MG/1
650 TABLET ORAL ONCE
Status: DISCONTINUED | OUTPATIENT
Start: 2025-04-16 | End: 2025-04-16 | Stop reason: HOSPADM

## 2025-04-16 RX ORDER — PROCHLORPERAZINE EDISYLATE 5 MG/ML
5 INJECTION INTRAMUSCULAR; INTRAVENOUS EVERY 8 HOURS PRN
Status: DISCONTINUED | OUTPATIENT
Start: 2025-04-16 | End: 2025-04-17

## 2025-04-16 RX ORDER — ESCITALOPRAM OXALATE 20 MG/1
20 TABLET ORAL DAILY
Status: DISCONTINUED | OUTPATIENT
Start: 2025-04-17 | End: 2025-04-17

## 2025-04-16 RX ORDER — MIDAZOLAM HYDROCHLORIDE 1 MG/ML
INJECTION INTRAMUSCULAR; INTRAVENOUS
Status: DISCONTINUED
Start: 2025-04-16 | End: 2025-04-16 | Stop reason: WASHOUT

## 2025-04-16 RX ORDER — HYDROMORPHONE HYDROCHLORIDE 1 MG/ML
0.6 INJECTION, SOLUTION INTRAMUSCULAR; INTRAVENOUS; SUBCUTANEOUS EVERY 5 MIN PRN
Status: DISCONTINUED | OUTPATIENT
Start: 2025-04-16 | End: 2025-04-16 | Stop reason: HOSPADM

## 2025-04-16 RX ORDER — DIPHENHYDRAMINE HYDROCHLORIDE 50 MG/ML
12.5 INJECTION, SOLUTION INTRAMUSCULAR; INTRAVENOUS AS NEEDED
Status: DISCONTINUED | OUTPATIENT
Start: 2025-04-16 | End: 2025-04-16 | Stop reason: HOSPADM

## 2025-04-16 RX ORDER — MIDAZOLAM HYDROCHLORIDE 1 MG/ML
1 INJECTION INTRAMUSCULAR; INTRAVENOUS EVERY 5 MIN PRN
Status: DISCONTINUED | OUTPATIENT
Start: 2025-04-16 | End: 2025-04-16 | Stop reason: HOSPADM

## 2025-04-16 RX ORDER — SODIUM CHLORIDE, SODIUM LACTATE, POTASSIUM CHLORIDE, CALCIUM CHLORIDE 600; 310; 30; 20 MG/100ML; MG/100ML; MG/100ML; MG/100ML
INJECTION, SOLUTION INTRAVENOUS CONTINUOUS
Status: DISCONTINUED | OUTPATIENT
Start: 2025-04-16 | End: 2025-04-16

## 2025-04-16 RX ORDER — HYDROMORPHONE HYDROCHLORIDE 1 MG/ML
INJECTION, SOLUTION INTRAMUSCULAR; INTRAVENOUS; SUBCUTANEOUS
Status: COMPLETED
Start: 2025-04-16 | End: 2025-04-16

## 2025-04-16 RX ORDER — ACETAMINOPHEN 10 MG/ML
1000 INJECTION, SOLUTION INTRAVENOUS ONCE
Status: COMPLETED | OUTPATIENT
Start: 2025-04-16 | End: 2025-04-16

## 2025-04-16 RX ORDER — HYDRALAZINE HYDROCHLORIDE 20 MG/ML
INJECTION INTRAMUSCULAR; INTRAVENOUS AS NEEDED
Status: DISCONTINUED | OUTPATIENT
Start: 2025-04-16 | End: 2025-04-16 | Stop reason: SURG

## 2025-04-16 RX ORDER — DEXAMETHASONE SODIUM PHOSPHATE 4 MG/ML
VIAL (ML) INJECTION AS NEEDED
Status: DISCONTINUED | OUTPATIENT
Start: 2025-04-16 | End: 2025-04-16 | Stop reason: SURG

## 2025-04-16 RX ORDER — FERROUS SULFATE 325(65) MG
325 TABLET, DELAYED RELEASE (ENTERIC COATED) ORAL
Status: DISCONTINUED | OUTPATIENT
Start: 2025-04-16 | End: 2025-04-17

## 2025-04-16 RX ORDER — CELECOXIB 100 MG/1
100 CAPSULE ORAL 2 TIMES DAILY
Status: DISCONTINUED | OUTPATIENT
Start: 2025-04-16 | End: 2025-04-17

## 2025-04-16 RX ORDER — ACETAMINOPHEN 500 MG
1000 TABLET ORAL 3 TIMES DAILY
Qty: 180 TABLET | Refills: 0 | Status: SHIPPED | COMMUNITY
Start: 2025-04-16 | End: 2025-05-16

## 2025-04-16 RX ORDER — LEVOTHYROXINE SODIUM 200 UG/1
200 TABLET ORAL
Status: DISCONTINUED | OUTPATIENT
Start: 2025-04-17 | End: 2025-04-17

## 2025-04-16 RX ORDER — ACETAMINOPHEN 10 MG/ML
INJECTION, SOLUTION INTRAVENOUS
Status: COMPLETED
Start: 2025-04-16 | End: 2025-04-16

## 2025-04-16 RX ORDER — VANCOMYCIN HYDROCHLORIDE 1 G/20ML
INJECTION, POWDER, LYOPHILIZED, FOR SOLUTION INTRAVENOUS AS NEEDED
Status: DISCONTINUED | OUTPATIENT
Start: 2025-04-16 | End: 2025-04-16 | Stop reason: HOSPADM

## 2025-04-16 RX ORDER — MIDAZOLAM HYDROCHLORIDE 1 MG/ML
INJECTION INTRAMUSCULAR; INTRAVENOUS AS NEEDED
Status: DISCONTINUED | OUTPATIENT
Start: 2025-04-16 | End: 2025-04-16 | Stop reason: SURG

## 2025-04-16 RX ORDER — LABETALOL HYDROCHLORIDE 5 MG/ML
10 INJECTION, SOLUTION INTRAVENOUS EVERY 10 MIN PRN
Status: DISCONTINUED | OUTPATIENT
Start: 2025-04-16 | End: 2025-04-16 | Stop reason: HOSPADM

## 2025-04-16 RX ORDER — BUPROPION HYDROCHLORIDE 150 MG/1
150 TABLET ORAL EVERY MORNING
Status: DISCONTINUED | OUTPATIENT
Start: 2025-04-17 | End: 2025-04-17

## 2025-04-16 RX ORDER — DIPHENHYDRAMINE HCL 25 MG
25 CAPSULE ORAL EVERY 4 HOURS PRN
Status: DISCONTINUED | OUTPATIENT
Start: 2025-04-16 | End: 2025-04-17

## 2025-04-16 RX ORDER — ASPIRIN 81 MG/1
81 TABLET ORAL 2 TIMES DAILY
Status: DISCONTINUED | OUTPATIENT
Start: 2025-04-16 | End: 2025-04-17

## 2025-04-16 RX ORDER — BISACODYL 10 MG
10 SUPPOSITORY, RECTAL RECTAL
Status: DISCONTINUED | OUTPATIENT
Start: 2025-04-16 | End: 2025-04-17

## 2025-04-16 RX ORDER — ACETAMINOPHEN 500 MG
1000 TABLET ORAL EVERY 8 HOURS
Status: DISCONTINUED | OUTPATIENT
Start: 2025-04-16 | End: 2025-04-17

## 2025-04-16 RX ORDER — ACETAMINOPHEN 500 MG
1000 TABLET ORAL ONCE
Status: DISCONTINUED | OUTPATIENT
Start: 2025-04-16 | End: 2025-04-16 | Stop reason: HOSPADM

## 2025-04-16 RX ORDER — SODIUM PHOSPHATE, DIBASIC AND SODIUM PHOSPHATE, MONOBASIC 7; 19 G/230ML; G/230ML
1 ENEMA RECTAL ONCE AS NEEDED
Status: DISCONTINUED | OUTPATIENT
Start: 2025-04-16 | End: 2025-04-17

## 2025-04-16 RX ORDER — ONDANSETRON 2 MG/ML
4 INJECTION INTRAMUSCULAR; INTRAVENOUS EVERY 6 HOURS PRN
Status: DISCONTINUED | OUTPATIENT
Start: 2025-04-16 | End: 2025-04-17

## 2025-04-16 RX ORDER — LABETALOL HYDROCHLORIDE 5 MG/ML
INJECTION, SOLUTION INTRAVENOUS AS NEEDED
Status: DISCONTINUED | OUTPATIENT
Start: 2025-04-16 | End: 2025-04-16 | Stop reason: SURG

## 2025-04-16 RX ORDER — CELECOXIB 200 MG/1
200 CAPSULE ORAL 2 TIMES DAILY
Qty: 60 CAPSULE | Refills: 0 | Status: SHIPPED | OUTPATIENT
Start: 2025-04-16 | End: 2025-05-16

## 2025-04-16 RX ORDER — TRAMADOL HYDROCHLORIDE 50 MG/1
50 TABLET ORAL EVERY 8 HOURS PRN
Qty: 40 TABLET | Refills: 0 | Status: SHIPPED | OUTPATIENT
Start: 2025-04-16

## 2025-04-16 RX ORDER — HYDROMORPHONE HYDROCHLORIDE 1 MG/ML
0.4 INJECTION, SOLUTION INTRAMUSCULAR; INTRAVENOUS; SUBCUTANEOUS EVERY 5 MIN PRN
Status: DISCONTINUED | OUTPATIENT
Start: 2025-04-16 | End: 2025-04-16 | Stop reason: HOSPADM

## 2025-04-16 RX ORDER — HYDROMORPHONE HYDROCHLORIDE 1 MG/ML
0.2 INJECTION, SOLUTION INTRAMUSCULAR; INTRAVENOUS; SUBCUTANEOUS EVERY 5 MIN PRN
Status: DISCONTINUED | OUTPATIENT
Start: 2025-04-16 | End: 2025-04-16 | Stop reason: HOSPADM

## 2025-04-16 RX ORDER — NALOXONE HYDROCHLORIDE 0.4 MG/ML
80 INJECTION, SOLUTION INTRAMUSCULAR; INTRAVENOUS; SUBCUTANEOUS AS NEEDED
Status: DISCONTINUED | OUTPATIENT
Start: 2025-04-16 | End: 2025-04-16 | Stop reason: HOSPADM

## 2025-04-16 RX ORDER — CEFADROXIL 500 MG/1
500 CAPSULE ORAL 2 TIMES DAILY
Qty: 20 CAPSULE | Refills: 0 | Status: SHIPPED | OUTPATIENT
Start: 2025-04-16 | End: 2025-04-26

## 2025-04-16 RX ORDER — TRANEXAMIC ACID 10 MG/ML
1000 INJECTION, SOLUTION INTRAVENOUS ONCE
Status: COMPLETED | OUTPATIENT
Start: 2025-04-16 | End: 2025-04-16

## 2025-04-16 RX ADMIN — DEXAMETHASONE SODIUM PHOSPHATE 8 MG: 4 MG/ML VIAL (ML) INJECTION at 07:53:00

## 2025-04-16 RX ADMIN — LABETALOL HYDROCHLORIDE 10 MG: 5 INJECTION, SOLUTION INTRAVENOUS at 09:30:00

## 2025-04-16 RX ADMIN — HYDRALAZINE HYDROCHLORIDE 10 MG: 20 INJECTION INTRAMUSCULAR; INTRAVENOUS at 09:45:00

## 2025-04-16 RX ADMIN — TRANEXAMIC ACID 1000 MG: 10 INJECTION, SOLUTION INTRAVENOUS at 09:09:00

## 2025-04-16 RX ADMIN — SODIUM CHLORIDE, SODIUM LACTATE, POTASSIUM CHLORIDE, CALCIUM CHLORIDE: 600; 310; 30; 20 INJECTION, SOLUTION INTRAVENOUS at 08:42:00

## 2025-04-16 RX ADMIN — MIDAZOLAM HYDROCHLORIDE 2 MG: 1 INJECTION INTRAMUSCULAR; INTRAVENOUS at 07:06:00

## 2025-04-16 RX ADMIN — ONDANSETRON 4 MG: 2 INJECTION INTRAMUSCULAR; INTRAVENOUS at 08:47:00

## 2025-04-16 RX ADMIN — SODIUM CHLORIDE, SODIUM LACTATE, POTASSIUM CHLORIDE, CALCIUM CHLORIDE: 600; 310; 30; 20 INJECTION, SOLUTION INTRAVENOUS at 07:05:00

## 2025-04-16 RX ADMIN — MIDAZOLAM HYDROCHLORIDE 1 MG: 1 INJECTION INTRAMUSCULAR; INTRAVENOUS at 09:56:00

## 2025-04-16 RX ADMIN — TRANEXAMIC ACID 1000 MG: 10 INJECTION, SOLUTION INTRAVENOUS at 07:21:00

## 2025-04-16 RX ADMIN — SODIUM CHLORIDE, SODIUM LACTATE, POTASSIUM CHLORIDE, CALCIUM CHLORIDE: 600; 310; 30; 20 INJECTION, SOLUTION INTRAVENOUS at 10:07:00

## 2025-04-16 RX ADMIN — HYDRALAZINE HYDROCHLORIDE 10 MG: 20 INJECTION INTRAMUSCULAR; INTRAVENOUS at 10:02:00

## 2025-04-16 NOTE — PLAN OF CARE
NURSING ADMISSION NOTE      Patient admitted via  Bed from PACU  Oriented to room.  Safety precautions initiated.  Bed in low position.  Call light in reach.    Skin check completed with JENNIFER Lucas present. No skin breakdown noted.

## 2025-04-16 NOTE — OPERATIVE REPORT
Operative Report    Patient Name: Carolina Allison  MR #: [id number]  : 1965  Date of Procedure: 25    Surgeon: Charles Mckeon MD   Assistant: Wesley Gold CSA, PA-C  No qualified fellow or resident available to assist    Preoperative Diagnosis: Right hip osteoarthritis  Postoperative Diagnosis: Same    Surgical Procedure:   Right total hip arthroplasty, anterior approach    Anesthesia: Spinal + pericapsular pain blockade  Estimated Blood Loss: 250 mL  IV Fluids: Documented in Anesthesia Report  Blood Products: None  Drains: None    Complications: None identified intraoperatively  Specimens: Femoral head  Findings: Degenerative changes of hip with complete loss of superior weightbearing cartilage, extensive synovitis    IMPLANTS: [employer]  Femoral component:  Insignia Femoral Component Size 4, Std Offset  Acetabular component: 48 mm Trident II cup   Liner: 36 mm Trident X3 E neutral polyethylene liner  Head 36 mm -2.5 mm Biolox Delta V40 Ceramic head    INDICATIONS:   Patient is a very pleasant and active 59 year old female who presents with hip osteoarthritis. Despite non-operative treatment, it has progressed and become severely debilitating to the patient. They are limited in their activities of daily living including walking, stairs, sleeping, and exercising. The procedure, as well the risks, benefits, and alternatives were discussed at length. Please see my preoperative history and physical for complete details. An opportunity was provided today for any additional questions, and these were answered to their satisfaction. The patient wished to proceed with a total hip replacement.    Possible complications of total hip replacement discussed were previously documented but include infection, implant/hip dislocation, implant loosening, implant wear in the future, need for reoperation at any time for above mentioned reasons, chronic hip/thigh pain or worsening groin pain, hip and knee  stiffness of the operated joint, painful arc of motion, difficult ambulation, and possible post-operative leg length discrepancy/apparent or actual. The possibility of fracture during bone preparation was discussed. The possibility of intraoperative and post operative medical complications including stroke, heart attack, and death were also discussed. Anesthesia related complications and reactions, blood clots/swelling of the limb and pulmonary embolism were also discussed. In case of infection of their joint, they understand that they will require prolonged IV antibiotic therapy and possible multiple operative procedures in the future.    Procedure:  After proper identification of the patient including verification and marking of the surgical site, the patient was brought to the operating room and placed in supine position on a [place] table with padded traction boots. All bony prominences were well padded. Anesthesia was induced without complications and weight based prophylactic IV Ancef was administered within 30 minutes of incision. 1g IV tranexamic acid was also administered    The surgical site which was properly marked, was then prepped and draped in a usual sterile fashion. A timeout was done utilizing protocol to again identify the patient, operative site which was marked appropriately, and the procedure to be performed. All were in agreement.    Attention was then drawn to surgical exposure of the hip. An incision was made with a #10 blade starting 2 cm lateral and 2cm distal to ASIS, measuring approximately 10cm, and coursing distally and laterally towards the fibular head in line with the TFL muscle. The skin and subcutaneous tissues were divided sharply down to the fascia nadia. The TFL fascia was incised in line with the skin, just anterior to the perforating vessels, exposing the TFL muscle. This muscle was bluntly dissected from the fascia and elevated from the underlying structures. The femoral neck  silhouette was dissected and the first extracapsular retractor was placed over the femoral neck. The lateral femoral circumflex vessels were identified, bluntly dissected and isolated, and cauterized with cautery. The deep investing fascia of the TFL was divided to allow proper mobilization of the muscle preventing damage during the procedure. The reflected head of the rectus femoris muscle was elevated off the anterior hip capsule and gently retracted forward with a May retractor.    The anterior capsulotomy was made sharply from the superolateral acetabulum to the saddle junction of the superior femoral neck and greater trochanter, then coursing inferomedial towards the lesser trochanter. The retractors were then placed in the intracapsular position for femoral neck osteotomy.  The femoral neck and head were visualized. Corresponding to the pre-operative template the neck was made to the desired height. A subcapital femoral neck cut was made in napkin ring fashion, and this piece was removed with a rongeur. The femoral head was removed with a corkscrew after giving traction to the operated leg. Care was taken to remove this in line with the incision to avoid any damage to the TFL musculature.    At this time, the leg was placed under slight traction and 40 deg of ER and attention was placed on acetabular preparation.  After placement of the anterior May retractor and posterior Kel retractor at the capsulolabral junction, a circumferential intracapsular labrum resection was done as well as removal of residual foveal tissue to better visualize the medial wall and the anterior/posterior walls of the acetabulum.     The first size reamer used was selected from preoperative templating as well as based on the femoral head size removed.  Reaming was done sequentially until the rim fit and coverage were appropriate after medialization to the desired template cup position.  Final reaming in a position of about 40 deg  of abduction and 15 deg of anteversion was performed with adequate rim fit. A final check was performed to verify there was no soft tissue remaining inside the prepared acetabulum. The final acetabular component was opened and placed in the desired orientation and impacted into place. Seating was confirmed through screw holes. Fluoroscopy was used to confirm appropriate component position. The final PE liner was opened and impacted into place at this time and its seating confirmed. Excellent cup fit and stability was obtained.    Attention was then turned to the femur.  Traction was removed and femur was placed in extension and adduction, delivering the osteotomized femoral neck into the wound.  A two-pronged femoral elevator was placed at the calcar and another at the tip of the greater trochanter. The posterolateral capsule was released with Bovie allowing mobilization of the femur lateral and anterior for safe preparation. The external rotators were visualized and preserved. A curved rasp was used to open the canal and identify our broaching trajectory. Serial broaching started with the backside broach to lateralize our position and then with the #0 broach and ended with the broach that exhibited excellent fit in the proximal femur without subsidence.  A change in pitch during mallet strikes was accompanied by the inability to advance the broach any further.  A trial head and neck were then placed on the broach and a trial reduction was performed. An intraoperative X-ray was taken at this time and adjustments were made to equalize limb length and offset accordingly as well as femoral component stem size. Stability and absence of impingement at 90 deg of external rotation of the hip was also confirmed intraoperatively.    The final trials were then removed and the wound was copiously irrigated with dilute Betadine and normal saline. Implants were opened and placed in back table. The femoral component was impacted  into place making sure the stem seated to the same depth as the final broach and the femoral head was impacted onto the clean trunnion after it was dried with a clean sponge. The hip was then reduced for the final time with no change in prior findings.    The wound was irrigated thoroughly with dilute betadine / normal saline solution. Joint cocktail injected into the capsule, TFL, RF, and VL muscles. 1g of intra-articular Vancomycin powder was placed. The fascial overlying the tensor fascia muscle was repaired with interrupted #2 Vicryl sutures followed by #2 Stratafix for a watertight closure. The subcutaneous tissues were closed with 2-0 Vicryl followed by 3- Nylon for the proximal skin and 3-0 Monocryl for remaining skin. We then applied a sterile Prineo dressing for the pannus and given her recent smoking cessation history.    The patient was awakened, extubated and transferred in stable to condition to PACU. Patient tolerated the procedure well and will start physical therapy with weight bearing as tolerated utilizing a walker and maintaining anterior hip precautions later today.

## 2025-04-16 NOTE — ANESTHESIA PROCEDURE NOTES
Spinal Block    Date/Time: 4/16/2025 7:07 AM    Performed by: Brenden Aranda MD  Authorized by: Brenden Aranda MD      General Information and Staff    Start Time:  4/16/2025 7:07 AM  End Time:  4/16/2025 7:15 AM  Anesthesiologist:  Brenden Aranda MD  Performed by:  Anesthesiologist  Patient Location:  OR  Site identification: surface landmarks    Reason for Block: surgical anesthesia    Preanesthetic Checklist: patient identified, IV checked, risks and benefits discussed, monitors and equipment checked, pre-op evaluation, timeout performed, anesthesia consent and sterile technique used      Procedure Details    Patient Position:  Sitting  Prep: ChloraPrep    Monitoring:  Cardiac monitor, heart rate and continuous pulse ox  Approach:  Midline  Location:  L3-4  Injection Technique:  Single-shot    Needle    Needle Type:  Sprotte  Needle Gauge:  24 G  Needle Length:  3.5 in    Assessment    Sensory Level:   Events: clear CSF, CSF aspirated, well tolerated and blood negative      Additional Comments

## 2025-04-16 NOTE — CONSULTS
[place] HOSPITALIST  CONSULT     Carolina Allison Patient Status:  Outpatient in a Bed    1965 MRN [id number]   Location [facility] 3SW-A Attending Charles Mckeon MD   Hosp Day # 0 PCP Cleve Herrera MD     Reason for consult: medical management    Requested by: Dr. Mckeon    Subjective:   History of Present Illness:     Carolina Allison is a 59 year old female with medical history hypertension hypothyroidism and hip osteoarthritis who is status post right total hip arthroplasty today.  She tolerated the procedure well.  She is having some minimal pain at the surgical site.  She denies any nausea, vomiting, chest pain or shortness of breath.    History/Other:    Past Medical History:  Past Medical History[1]  Past Surgical History:   Past Surgical History[2]   Family History:   Family History[3]  Social History:    reports that she has been smoking cigarettes. She has a 20 pack-year smoking history. She has never used smokeless tobacco. She reports current alcohol use. She reports that she does not use drugs.     Allergies: Allergies[4]    Medications:  Medications Ordered Prior to Encounter[5]    Review of Systems:   A comprehensive review of systems was completed.    Pertinent positives and negatives noted in the HPI.    Objective:   Physical Exam:    /82 (BP Location: Right arm)   Pulse 70   Temp 98.4 °F (36.9 °C) (Oral)   Resp 13   Ht 5' 7\" (1.702 m)   Wt 214 lb 12.8 oz (97.4 kg)   SpO2 94%   BMI 33.64 kg/m²   General: No acute distress, Alert  Respiratory: No rhonchi, no wheezes  Cardiovascular: S1, S2. Regular rate and rhythm  Abdomen: Soft, NT/ND, +BS  Neuro: No new focal deficits  Extremities: No edema      Results:    Labs:      Labs Last 24 Hours:  No results for input(s): \"WBC\", \"HGB\", \"MCV\", \"PLT\", \"BAND\", \"INR\" in the last 168 hours.    Invalid input(s): \"LYM#\", \"MONO#\", \"BASOS#\", \"EOSIN#\"    No results for input(s): \"GLU\", \"BUN\", \"CREATSERUM\", \"GFRAA\", \"GFRNAA\", \"CA\", \"ALB\",  \"NA\", \"K\", \"CL\", \"CO2\", \"ALKPHO\", \"AST\", \"ALT\", \"BILT\", \"TP\" in the last 168 hours.    No results for input(s): \"PTP\", \"INR\" in the last 168 hours.    No results for input(s): \"TROP\", \"CK\" in the last 168 hours.      Imaging: Imaging data reviewed in Epic.    Assessment & Plan:      #Hypertension  -holding meds today  -resume in am if BP stable    #Hypothyroidism  -levothyroxine    #depression  -resume PTA meds        Plan of care discussed with patient and RN    Brenda Erickson MD  2025    The  [employer] Cures Act makes medical notes like these available to patients in the interest of transparency. Please be advised this is a medical document. Medical documents are intended to carry relevant information, facts as evident, and the clinical opinion of the practitioner. The medical note is intended as peer to peer communication and may appear blunt or direct. It is written in medical language and may contain abbreviations or verbiage that are unfamiliar.            [1]   Past Medical History:   Acne rosacea    ADD (attention deficit disorder)    Chronic midline low back pain without sciatica    Depression    Disorder of thyroid    High blood pressure    High cholesterol    Hyperlipidemia    Hypothyroid    Osteoarthritis    lumbar, R hip    Primary hypertension    Reactive airway disease (HCC)    Visual impairment    reading glasses   [2]   Past Surgical History:  Procedure Laterality Date                X2    Colonoscopy N/A 2017    Procedure: COLONOSCOPY, POSSIBLE BIOPSY, POSSIBLE POLYPECTOMY 93547;  Surgeon: Noé Suárez DO;  Location: [facility]    Hip arthroplasty Left     So biopsy stereo nodule 2 site bilat (cpt=19081/41340)      Other surgical history      partial thyroidectomy    Other surgical history      BACK SURGERY    Other surgical history      FEET SURGERY   [3]   Family History  Problem Relation Age of Onset    Heart Disorder Father     Hypertension Mother    [4] No  Known Allergies  [5]   No current facility-administered medications on file prior to encounter.     Current Outpatient Medications on File Prior to Encounter   Medication Sig Dispense Refill    losartan 25 MG Oral Tab Take 2 tablets (50 mg total) by mouth daily.      CLINDAMYCIN PHOS-BENZOYL PEROX 1-5 % External Gel APPLY TOPICALLY TO THE AFFECTED AREA TWICE DAILY 50 g 6    buPROPion  MG Oral Tablet 24 Hr Take 1 tablet (150 mg total) by mouth every morning. 90 tablet 2    LEVOTHYROXINE 200 MCG Oral Tab TAKE 1 TABLET DAILY 90 tablet 3    CITALOPRAM 40 MG Oral Tab TAKE 1 TABLET DAILY 90 tablet 3    hydroCHLOROthiazide 12.5 MG Oral Tab Take 1 tablet (12.5 mg total) by mouth daily. 90 tablet 5

## 2025-04-16 NOTE — CM/SW NOTE
04/16/25 0800   CM/SW Referral Data   Referral Source Physician   Reason for Referral Discharge planning   Informant EMR;Clinical Staff Member   Discharge Needs   Anticipated D/C needs Home health care   Choice of Post-Acute Provider   Patient/family choice [facility]     Pt is a 59 year old female admitted R Anterior Total Hip Arthroplasty    She was set up with [facility] pre-operatively. Referral packet sent via [facility]    / to remain available for support and/or discharge planning.     Radha POLANCOA MSN, RN Case Manager  [id number]

## 2025-04-16 NOTE — H&P
History and Physical   from Patient's Primary Care Provider Elaine Palacio NP on 4/10/25 is as below    Assessment & Plan  Preop examination    Chas Allison is a 59 y.o.    Reason(s) for visit on 4/10/2025:  Surgical Clearance  Comments (if any): fasting    HPI:    Based on my evaluation, patient is medically stable and with no apparent contraindications for surgery.    All relative medical conditions have been reviewed and evaluated.  Moderate Risk- carotid endarterectomy, head and neck surgery, intraperitoneal or intrathoracic surgery, orthopedic surgery, prostate surgery.  No contraindication for prophylactic DVT or GI protocols.  Preoperative workup completed as follows: CBC, CMP, coagulation studies, ECG, Cardiac Clearance.  Change in medication regimen before surgery: Hold Adderall and hydrochlorothiazide the morning of surgery, continue remaining medication regimen including morning of surgery, with sip of water.  Outstanding referrals or testing: none.  Code Status: full code    Patient is at an acceptable medical risk and may proceed with surgery.    Chas Allison is a 59 y.o. female who presents to the office today for a preoperative evaluation prior to an elective procedure at the request of surgeon Dr. Mckeon who plans on performing right anterior total hip arthroplasty on 4/16/2025. The type of anesthesia is general.    Reason(s) for visit on 4/10/2025  · Surgical Clearance Comments (if any):    The following portions of the patient's chart were reviewed by me during this encounter and updated as appropriate:      No Known Allergies    Current Outpatient Medications: · albuterol  (90 Base) MCG/ACT inhaler, INHALE 2 PUFFS INTO THE LUNGS EVERY 4 HOURS AS NEEDED FOR WHEEZING, Disp: , Rfl: · amphetamine-dextroamphetamine XR (ADDERALL XR) 30 MG 24 hr capsule, Take 30 mg by mouth., Disp: , Rfl: · atorvastatin (LIPITOR) 40 MG tablet, Take 40 mg by mouth every night., Disp: , Rfl: ·  buPROPion XL (WELLBUTRIN XL) 150 MG 24 hr tablet, Take 150 mg by mouth., Disp: , Rfl: · citalopram (CeleXA) 40 MG tablet, Take 40 mg by mouth once daily as needed., Disp: , Rfl: · hydroCHLOROthiazide (HYDRODIURIL) 12.5 MG tablet, Take 12.5 mg by mouth once daily as needed., Disp: , Rfl: · levothyroxine (SYNTHROID) 200 MCG tablet, Take 200 mcg by mouth once daily as needed., Disp: , Rfl: · losartan (COZAAR) 25 MG tablet, Take 25 mg by mouth once daily as needed., Disp: , Rfl: · traMADol (ULTRAM) 50 MG tablet, Take 50 mg by mouth., Disp: , Rfl:    Patient Active Problem List:  Attention deficit hyperactivity disorder (ADHD), combined type  Chronic midline low back pain without sciatica  Recurrent major depressive disorder, in remission (CMS/HCC)  Mixed hyperlipidemia  Other specified hypothyroidism  Primary osteoarthritis of right hip  Reactive airway disease  Primary hypertension    Past Medical History:  05/12/2023: Attention deficit hyperactivity disorder (ADHD), combined  type  05/12/2023: Mixed hyperlipidemia  05/12/2023: Other specified hypothyroidism  05/12/2023: Primary osteoarthritis of right hip    Past Surgical History:  No date: BACK SURGERY  2019: HIP ARTHROPLASTY; Left  No date: THYROIDECTOMY, PARTIAL    Social History  Tobacco Use  Smoking status: Former. Quit in past month  Packs/day: 0.50  Years: 0.5 packs/day for 40.0 years (20.0 ttl pk-yrs)  Types: Cigarettes  Smokeless tobacco: Never  Alcohol use: Not Currently  Alcohol/week: 1.0 standard drink of alcohol  Types: 1 Glasses of wine per week  Drug use: Never    Immunization History  Administered Date(s) Administered  COVID-19 (Pfizer Mono 12 yrs+) (CVX-208)  02/02/2021 03/16/2021 11/18/2021  Hepatitis B (ENGERIX-B,RECOMBIVAX HB) (CVX-43)  04/11/2016 03/08/2017 09/05/2018  Influenza, (Afluria Fluarix Flulaval Fluzone) quad, PF (CVX-150)  10/26/2015 09/29/2020 12/08/2021  10/22/2022  Tdap (ADACEL BOOSTRIX) (CVX-115)  04/04/2016 05/12/2023  Zoster  (Shingrix) (TWO VIALS-MUST MIX) recombinant (CVX-187)  05/12/2023    Prior experiences with anesthesia: yes  Any loose, chipped, or false teeth? no  Bridgework? no  Oral piercings? no  History of recent respiratory or wound infections: yes  Current metabolic equivalents (METS > 4): Moderate (4 to 7 METS's) - Cycling, climbing flight of stairs, golf (without cart), walking 4 mph, raking leaves, weeding, pushing a power mower    Surgical Risk: low    Objective    OB Status Postmenopausal  Smoking Status: Former    Cardiographics  ECG: Cardiac clearance obtained on 2/6/2025 by Dr. Hunter Magdaleno    Lab Review CMP, CBC, PT, PTT/INR reviewed. All labs within normal limits except for mildly elevated ALT (34)  Lab findings: mildly abnormal but acceptable    Surgical Clearance  Associated symptoms include arthralgias (right hip). Pertinent negatives include no abdominal pain, chest pain, congestion, joint swelling, myalgias or weakness.    Review of Systems  Constitutional: Negative for activity change and appetite change.  HENT: Negative for congestion and hearing loss.  Eyes: Negative for photophobia, pain and visual disturbance.  Respiratory: Negative for chest tightness, shortness of breath and wheezing.  Cardiovascular: Negative for chest pain, palpitations and leg swelling.  Gastrointestinal: Negative for abdominal pain, blood in stool, constipation and diarrhea.  Musculoskeletal: Positive for arthralgias (right hip). Negative for joint swelling and myalgias.  Skin: Negative for discoloration and dry skin.  Neurological: Negative for tremors, syncope, speech difficulty, weakness and light-headedness.  Endo: Negative for cold intolerance, heat intolerance and polyuria.    Psychiatric/Behavioral: Negative for agitation, self-injury, sleep disturbance and suicidal ideas. The patient is not nervous/anxious.  Breast: Negative for breast mass, nipple discharge and skin change.  Allergy/Immuno: Negative for immunocompromised  state.  Genitourinary: Negative for enuresis and hematuria.    The following portions of the patient's chart were reviewed by me during this encounter and updated as appropriate:  Tobacco  Allergies  Meds  Problems  Med Hx  Surg Hx  Fam Hx         Objective    Visit Vitals  /84 (BP Location: Right arm, Patient Position: Sitting, BP Cuff Size: Adult)  Pulse 84  Temp 96.9 °F (Temporal)  Ht 5' 7\"  Wt 213 lb 14.4 oz  SpO2 97%  BMI 33.50 kg/m²  OB Status Postmenopausal  Smoking Status Former  BSA 2.14 m²    Physical Exam  Vitals and nursing note reviewed.  Constitutional:  General: She is not in acute distress.  Appearance: She is not toxic-appearing.  HENT:  Head: Normocephalic and atraumatic.  Right Ear: External ear normal.  Left Ear: External ear normal.  Nose: Nose normal.  Mouth/Throat:  Lips: Pink.  Mouth: Mucous membranes are moist.  Eyes:  Conjunctiva/sclera: Conjunctivae normal.  Pupils: Pupils are equal, round, and reactive to light.  Cardiovascular:  Rate and Rhythm: Normal rate and regular rhythm.  Pulses: Normal pulses.  Heart sounds: Normal heart sounds.  Pulmonary:  Effort: Pulmonary effort is normal.  Breath sounds: Normal breath sounds.  Abdominal:  General: Bowel sounds are normal.  Palpations: Abdomen is soft. There is no mass.  Tenderness: There is no abdominal tenderness.  Musculoskeletal:  General: Normal range of motion.  Cervical back: Normal range of motion and neck supple.  Lymphadenopathy:  Cervical:  Right cervical: No superficial cervical adenopathy.  Left cervical: No superficial cervical adenopathy.  Skin:  General: Skin is warm and dry.  Capillary Refill: Capillary refill takes less than 2 seconds.  Coloration: Skin is not jaundiced or pale.  Neurological:  General: No focal deficit present.  Mental Status: She is alert and oriented to person, place, and time.  Gait: Gait normal.  Psychiatric:  Mood and Affect: Mood normal.  Behavior: Behavior normal.

## 2025-04-16 NOTE — PHYSICAL THERAPY NOTE
PHYSICAL THERAPY JOINT EVALUATION - INPATIENT     Room Number: 376/376-A  Evaluation Date: 4/16/2025  Type of Evaluation: Initial  Physician Order: PT Eval and Treat    Presenting Problem: s/p R JAKI  Co-Morbidities : OA, HLD, HTN, hypothyroidism, asthma, depression, hx of back sx, L JAKI  Reason for Therapy: Mobility Dysfunction and Discharge Planning    PHYSICAL THERAPY ASSESSMENT   Patient is a 59 year old female admitted 4/16/2025 for s/p R JAKI on 4/16/25.  Prior to admission, patient's baseline is independent without the use of an assistive device for all ADL's and ambulation.  Patient is currently functioning near baseline with bed mobility, transfers, gait, stair negotiation, maintaining seated position, and standing prolonged periods.  Patient is requiring independent and contact guard assist as a result of the following impairments: decreased functional strength, decreased endurance/aerobic capacity, pain, impaired   balance, decreased muscular endurance, and limited   ROM.  Physical Therapy will continue to follow for duration of hospitalization.    Patient will benefit from continued skilled PT Services at discharge to promote prior level of function.  Anticipate patient will return home with home health PT.    PLAN DURING HOSPITALIZATION  Nursing Mobility Recommendation : 1 Assist  PT Device Recommendation: Rolling walker  PT Treatment Plan: Bed mobility, Endurance, Energy conservation, Patient education, Family education, Gait training, Stair training, Transfer training, Balance training  Rehab Potential : Good  Frequency (Obs): Daily     CURRENT GOALS  Goal #1  Patient is able to demonstrate supine - sit EOB @ level: supervision     Goal #2  Patient is able to demonstrate transfers Sit to/from Stand at assistance level: supervision   Goal #3    Patient is able to ambulate 150 feet with assistive device at assistance level: supervision   Goal #4    Patient will negotiate 4 stairs/one curb w/ assistive  device and supervision   Goal #5    Patient verbalizes and/or demonstrates all precautions and safety concerns independently    Goal #6      Goal Comments: Goals established on 2025    PHYSICAL THERAPY MEDICAL/SOCIAL HISTORY  History related to current admission: Patient is a 59 year old female admitted on 2025 from home for s/p R JAKI on 25.      HOME SITUATION  Type of Home: House   Home Layout: Two level, Able to live on main level  Stairs to Enter : 0     Stairs to Bedroom:  (stair lift, flight of stairs (moved bed to first floor though))  Railing: Yes  Lives With: Spouse (Mom)  Drives: Yes  Patient Regularly Uses: None    Prior Level of [place]: Pt is independent without the use of an assistive device for all ADL's and ambulation. Pt reports that she has canes and Rw's at home for use. Pt reports that she has a stair lift at home for her mother but typically takes the stairs. However, pt reports that she moved her bed to the first floor and will not need to take the stairs to her bedroom.    SUBJECTIVE  Pt was pleasant and agreeable to a session.    OBJECTIVE  Precautions: Bed/chair alarm, JAKI - anterior (wound vac)  Fall Risk: High fall risk    WEIGHT BEARING RESTRICTION  R Lower Extremity: Weight Bearing as Tolerated    PAIN ASSESSMENT  Ratin  Location: R anterior hip  Management Techniques: Activity promotion, Repositioning    COGNITION  Overall Cognitive Status:  WFL - within functional limits    RANGE OF MOTION AND STRENGTH ASSESSMENT  Upper extremity ROM and strength are within functional limits     Lower extremity ROM is within functional limits, except s/p R JAKI on today's date.    Lower extremity strength is within functional limits, except s/p R JAKI on today's date.      BALANCE  Static Sitting: Good  Dynamic Sitting: Good  Static Standing: Fair -  Dynamic Standing: Poor +    ADDITIONAL TESTS                                    ACTIVITY TOLERANCE   Pt denied dizziness or  lightheadedness with activity. Pt did report having a 6/10 pain. RN was notified. Pt does have a wound vac in the incisional area of the JAKI.                      O2 WALK       NEUROLOGICAL FINDINGS                        AM-PAC '6-Clicks' INPATIENT SHORT FORM - BASIC MOBILITY  How much difficulty does the patient currently have...  Patient Difficulty: Turning over in bed (including adjusting bedclothes, sheets and blankets)?: None   Patient Difficulty: Sitting down on and standing up from a chair with arms (e.g., wheelchair, bedside commode, etc.): A Little   Patient Difficulty: Moving from lying on back to sitting on the side of the bed?: None   How much help from another person does the patient currently need...   Help from Another: Moving to and from a bed to a chair (including a wheelchair)?: A Little   Help from Another: Need to walk in hospital room?: A Little   Help from Another: Climbing 3-5 steps with a railing?: A Little       AM-PAC Score:  Raw Score: 20   Approx Degree of Impairment: 35.83%   Standardized Score (AM-PAC Scale): 47.67   CMS Modifier (G-Code): CJ    FUNCTIONAL ABILITY STATUS  Gait Assessment   Functional Mobility/Gait Assessment  Gait Assistance: Contact guard assist  Distance (ft): 125  Assistive Device: Rolling walker  Pattern: R Decreased stance time    Skilled Therapy Provided: Per RN okay to see pt. Pt was received in supine and was agreeable to a session.    Bed Mobility:  Rolling: NT  Supine to sit: independent; Pt performed too quickly though and complained of pain.    Sit to supine: NT     Transfer Mobility:  Sit to stand: CGA; Pt was given VC's for hand placement.   Stand to sit: CGA; Pt was able to control descent to chair.  Gait = Pt ambulated with CGA and a RW for approximately 125ft.     Therapist's Comments: Pt was educated on role of therapy, goals of session, WB precautions, surgical restrictions, edema control, and appropriate height for a RW.     Exercise/Education  Provided:  Bed mobility  Functional activity tolerated  Gait training  Transfer training    Patient End of Session: Up in chair, Needs met, Call light within reach, RN aware of session/findings, All patient questions and concerns addressed, Hospital anti-slip socks, SCDs in place, Alarm set    Patient Evaluation Complexity Level:  History Moderate - 1 or 2 personal factors and/or co-morbidities   Examination of body systems Low -  addressing 1-2 elements   Clinical Presentation Low- Stable   Clinical Decision Making Low Complexity       PT Session Time: 25 minutes  Gait Training: 10 minutes

## 2025-04-16 NOTE — INTERVAL H&P NOTE
Pre-op Diagnosis: Primary osteoarthritis of right hip [M16.11]    The above referenced H&P was reviewed by Charles Mckeon MD on 4/16/2025, the patient was examined and no significant changes have occurred in the patient's condition since the H&P was performed.  I discussed with the patient and/or legal representative the potential benefits, risks and side effects of this procedure; the likelihood of the patient achieving goals; and potential problems that might occur during recuperation.  I discussed reasonable alternatives to the procedure, including risks, benefits and side effects related to the alternatives and risks related to not receiving this procedure.  We will proceed with procedure as planned.

## 2025-04-16 NOTE — ANESTHESIA POSTPROCEDURE EVALUATION
[facility]    Carolina Allison Patient Status:  Outpatient in a Bed   Age/Gender 59 year old female MRN [id number]   Location [facility] SURGERY Attending Charles Mckeon MD   Hosp Day # 0 PCP Cleve Herrera MD       Anesthesia Post-op Note    RIGHT ANTERIOR TOTAL HIP ARTHROPLASTY    Procedure Summary       Date: 04/16/25 Room / Location:  MAIN OR  /  MAIN OR    Anesthesia Start: 0705 Anesthesia Stop: 1007    Procedure: RIGHT ANTERIOR TOTAL HIP ARTHROPLASTY (Right: Hip) Diagnosis:       Primary osteoarthritis of right hip      (Primary osteoarthritis of right hip [M16.11])    Surgeons: Charles Mckeon MD Anesthesiologist: Brenden Aranda MD    Anesthesia Type: spinal, general ASA Status: 2            Anesthesia Type: spinal, general    Vitals Value Taken Time   /89 04/16/25 10:09   Temp 98.3 04/16/25 10:09   Pulse 67 04/16/25 10:09   Resp 16 04/16/25 10:09   SpO2 97 04/16/25 10:09           Patient Location: PACU    Anesthesia Type: general and spinal    Airway Patency: patent    Postop Pain Control: adequate    Mental Status: preanesthetic baseline    Nausea/Vomiting: none    Cardiopulmonary/Hydration status: stable euvolemic    Complications: no apparent anesthesia related complications    Postop vital signs: stable (BP elevated but improved after labetalol, hydralazine)    Dental Exam: Unchanged from Preop    Patient to be discharged from PACU when criteria met.

## 2025-04-16 NOTE — DISCHARGE INSTRUCTIONS
Sometimes managing your health at home requires assistance.  The [facility] team has recognized your preference to use [facility].  They can be reached by phone at [phone].  The fax number for your reference is [phone].. A representative from the home health agency will contact you or your family to schedule your first visit.      Charles Mckeon MD  TOTAL HIP REPLACEMENT INSTRUCTIONS      POST OPERATIVE MEDICATIONS  The medications below will be sent to your pharmacy (unless contraindicated due to allergies, test results or other medical conditions).       THESE MEDICATIONS ARE TO BE TAKEN AFTER SURGERY      ANTIBIOTIC: Cefadroxil 500 mg (one pill twice daily for 10 days #20)     PAIN MEDICATIONS: Listed in order of importance    Acetaminophen (Tylenol)  Mild, moderate, and severe pain Tylenol should be taken as directed until all your pain is gone. This is the last medication you should wean from. Do not exceed 3000 mg (or 3 grams) per day       Meloxicam (Mobic) Take as directed for 1 month after surgery. These are very effective in decreasing pain and inflammation. Do not stop taking this prior to 1 month unless you no longer need Tylenol, Tramadol, and Oxycodone     Tramadol (Ultram)  Moderate and Severe Pain If Tylenol by itself is not sufficient for pain control, then take Tramadol. This is the second medication you should wean from     Oxycodone 5 mg  Severe and Breakthrough Pain If Tylenol and Tramadol together are not adequately controlling your pain, then add Oxycodone as your rescue pain medication. This is a narcotic and the first medication you should wean from     Note: If you take a blood thinner other than aspirin - you will not receive an anti-inflammatory.    BLOOD CLOT PREVENTION: Aspirin 81 mg (one tablet twice daily for 4 weeks only #60)     CONSTIPATION: Polyethylene Glycol (Miralax) 17.9 g (one packet or dose daily as needed for constipation). Narcotic  pain medication can make you constipated. If you are not constipated, you do not need this. If you are still constipated despite this, you may take Senna 8.6 mg (one tablet daily).        Opioid Safety Strategy for Joint Replacement Patients    Our department policy is designed for the safe and effective management of post-operative pain. Additionally, this prescribing policy will help reduce the number of pills that can be diverted, abused and/or misused.     The following guidelines are part of a strategy to keep patients as safe and as comfortable as possible:   1. Only one doctor prescribes opioids.   2. Patients on buprenorphine, methadone, or daily opioids get opioids from their PCP or pain medicine specialist.   3. New office patients with long-standing conditions will not be prescribed opioids.   4. Orthopaedic surgeons do not use long-acting opioids.  5. Orthopaedic surgeons do not treat persistent pain with opioids.   6. Statewide databases are checked prior to prescribing opioids.      The following recommendations for the management of post-operative pain after total joint replacement are adapted from the American Academy of Orthopaedic Surgeons and American Society of Anesthesiologists. A patient will receive no more than 50 opioid pills for their initial prescription. If this is received prior to surgery, it is not to be used until after your surgery.  There will be a maximum of 10 mg of oxycodone/hydrocodone (or equivalent) per pill.   A single refill of no more than 40 pills.   The goal is to discontinue opioids within 1 month of surgery.   In the first two weeks after surgery the routine use of ice can decrease pain and reduce swelling.   We encourage use of acetaminophen or ibuprofen or both instead of opioids. Please be sure not to exceed the daily recommended doses of over the counter medications.  We encourage the use of a pill cutter to decrease the dose to half or quarter pill as comfort  improves in the days and weeks following your surgery  If a refill is anticipated please call 48 hours before running out of pain medicine. Call Monday- Thursday during business hours to avoid delays in any refills. Do not wait until Friday or the weekend to request a refill.  [facility] pharmacies routinely will not provide patients with a full prescription which has created many problems for our patients. When you schedule surgery, we request that you designate another pharmacy to ensure the quality of your care.  Patients with more pain than expected will be evaluated in the office.       PREPARING FOR YOUR JOINT REPLACEMENT    Most patients will be able to go home the day of surgery or the day after. Your  (family member or friend) will bring you home after surgery.  Someone should stay with you the first night or two after discharge.  Allow yourself adequate time for your recovery. It is important that you set aside the time to ensure the best outcome possible.  For home safety remove things that could present a fall risk (i.e. area rugs and small, low height pieces of furniture).  If you are a pet owner, have someone available to care for them for the early phases of your recovery.  Optimize your diet by increasing fiber, protein and hydration.  Complete all dental work at least 2 weeks prior to surgery (i.e. cleanings, root canals, extractions).  Smoking cessation is mandatory to prevent complications. Stop smoking 4 weeks prior to surgery and do not smoke for 4 weeks after surgery. If you are a smoker we can direct you to a program to help you quit before surgery.  You cannot drive while taking narcotic pain medication (i.e. hydrocodone, codeine, etc.). You may need to arrange for transportation to your initial follow up visit.  Your first follow up will be approximately 2 weeks after surgery. If you live out of town, this visit will be customized to your needs. Regular follow-up appointments are  at 6 weeks, 3 months, (6 months if needed), and 1 year.       EXERCISE BEFORE SURGERY  If you exercise regularly, please continue your regular program as long as it is not worsening your symptoms.  If you are not exercising, we would like you to begin doing the exercises as described in our guidelines. If the exercises worsen your symptoms either modify or stop doing the exercises and notify our office.      THERAPY IN THE HOSPITAL  Your mobility will be checked by physical therapy or the nursing staff before discharge. The focus will be making sure you are safe to go home.  Your  is encouraged to attend your physical therapy session with you.  Most patients will use a walker or cane for assistance.      PHYSICAL THERAPY & HOME EXERCISE PROGRAMS  Many patients have successful outcomes following the exercises given in the guidelines making formal physical therapy unnecessary. Our emphasis is on self-directed home exercise programs to promote long term wellness.  For patients that require additional help we will prescribe formal outpatient physical therapy. This will require the patient to go to a facility for formal exercise 2 to 3 times a week. It is absolutely necessary to continue your daily exercises at home to get the best outcome possible.  In some circumstances home therapy may be set up until you are ready to progress to outpatient therapy. We recommend transitioning to outpatient physical therapy as soon as possible.      FAQs AFTER HIP REPLACEMENT SURGERY    WHAT IF MY LEG SWELLS AFTER SURGERY?   Swelling is expected and may increase during the first few weeks. After 2-3 weeks, swelling can continue to occur for 6-9 months or more with activity; however, it will gradually decrease with time. You can expect swelling in your entire leg including your foot. Everyone is different, sometimes the swelling will take several days to develop. Remember that your body is healing from surgery and swelling is  normal. Follow the guidelines below to reduce swelling.    REST: We want you to be up and moving but do this in moderation. Exercise is important but don’t overdo it! Increased activity means increased swelling. By decreasing the swelling early, you will recover quicker.  ICE: Ice as much as possible the first week or two. Ice is a great anti-inflammatory and helps minimize swelling. Apply ice to the covered surgical site every 1-2 hours for 15-20 minutes at a time.       WILL I HAVE BRUISING AFTER SURGERY?  Yes, most people will have bruising after surgery. It may only be around the incision, but it may also involve the entire leg. Both are considered normal and will resolve over 10-14 days with proper care.      HOW DO I TAKE CARE OF MY INCISION?  Your incision will be covered by a water-resistant dressing. This dressing may be removed after 7 days and should be replaced by a dry (Island) dressing every 2 to 3 days until your first follow up visit.    Most of the time, your stitches will be under the skin and will dissolve on their own. If you have staples they can be removed 10-14 days after surgery as long as there is no drainage. If the wound is draining, the dressing should be changed once it becomes saturated. The wound should be dry and without drainage by about 7 days postoperative. If there is persistent drainage from the wound after this time period, you should call our office. If there is worsening redness around the incision, you should also call the office.      WHEN CAN I SHOWER OR BATHE?  You can shower three days after your surgery. Your water-resistant dressing and incision will need to be covered with a waterproof plastic such as Glad Press n’ Seal. Most patients can take a bath or go swimming after 6 weeks.       WHAT POSITIONS CAN I SLEEP IN?  You may sleep your back or side but avoid sleeping on your stomach for the first 2 weeks after surgery. Many patients feel most comfortable sleeping on  their side with a pillow between their legs during the first month of recovery.       DO I NEED TO AVOID CERTAIN POSITIONS WITH MY LEG, OR OBSERVE HIP PRECAUTIONS?  The physical therapists and nursing staff may tell you to avoid extending the leg and turning your foot outward in combination. This may occur during certain activities such as yoga. In general, I recommend avoiding extremes of motion following your joint replacement.      HOW MUCH WEIGHT CAN I PUT ON MY LEG AFTER SURGERY?  You may put as much weight as feels comfortable on your surgical leg after surgery unless told otherwise. Most patients will use an assist device (i.e. a walker, crutches or cane) for the first few weeks after surgery. Becoming independent from assist devices is usually directly related to how debilitated and deconditioned you were prior to surgery. If you have used a walker, crutches or cane for a long time before surgery be patient and take your time to make sure you are strong enough and stable enough to walk independently. Typically, we recommend that you continue them until we are certain you are safe.      WHAT SHOULD I EXPECT MY ACTIVITY LEVEL BE?  All patients are provided with a joint replacement handbook with very specific instructions on activity level and exercise that we expect you to rely on. That being said, everyone is different. You should slowly increase your activity level every day, but let your discomfort and swelling be your guide. It is not uncommon to experience setbacks if you do too much too soon. When this occurs do not be discouraged, adjust your level of activity and make sure you rely on rest, ice, and elevation to decrease the swelling.       WHEN CAN I DRIVE?  Literature suggests reaction time for driving returns approximately 3-4 weeks following a hip replacement. Patients must be off narcotic medication at this time (otherwise you can get a DUI) and walking without an assistive device (cane/walker)  prior to attempting driving.      WHAT IF I AM HAVING PROBLEMS SLEEPING?  Most patients have some trouble sleeping after joint replacement surgery. Make sure that your discomfort is being controlled by following the guidelines for managing pain and swelling. Sleep during the day may make it harder to sleep throughout the night. A small percentage of patients will continue to have some difficulty sleeping that may be treated with medicine on a temporary basis.      WHAT SHOULD I EAT AFTER SURGERY?  Prior to surgery you should be adjusting your diet to help achieve the best outcome possible. These recommendations can be found in the guidebook and joint class. After surgery, continue to maintain good protein intake to help with the healing process. You should also drink plenty of fluids and eat foods high in fiber to avoid constipation. Avoid processed foods, soda, and other foods / drinks with added sugar. Having stable / controlled blood sugar levels will decrease your risk of complications following surgery.      WHAT SHOULD I DO TO RELIEVE CONSTIPATION?  All narcotic pain medications have constipation as a side effect, so it is important to continue these recommendations until they are no longer needed. Stool softeners should be started a few days before surgery and continued until your bowel habits normalize after surgery. The recommended medications can be bought over the counter at a pharmacy.      WHEN CAN I RESTART THE MEDS I WAS TOLD TO STOP PRIOR TO SURGERY?  We recommend resuming all of your regular prescriptions, vitamins and supplements after discharge from the hospital except for biologic medications often prescribed by rheumatologists. You may resume those types of medications 2 weeks after surgery.       NOW THAT I AM NO LONGER REQUIRING NARCOTIC PAIN MEDICATION, WHAT CAN I TAKE IF I SHOULD EXPERIENCE DISCOMFORT?  You may take Tylenol in addition to the NSAID that you were prescribed (NSAIDs are not  prescribed for patients taking a blood thinner other than aspirin).       WHAT SHOULD I DO IF I THINK MY JOINT IS INFECTED?  Bruising and swelling after surgery is normal. Small amounts of clear yellow, pink or bloody drainage may be normal too. Contact the office if you have a large amount of drainage, if the drainage looks like pus, if you have a temperature of 100.4, or if you have a sudden increase in pain that is not controlled by your pain medications and printed guidelines.      WHEN TO CALL DR. MCKINNEY’S OFFICE:    Fever above 100.4° consistently  Increased drainage or drainage that has saturated the dressing  Drainage that looks like pus  Pain that is not controlled by medications, ice and elevation  Inability to bear weight on your operative leg  Swelling in foot or calf that is accompanied by coolness or decreased sensation in foot        ANTIBIOTIC PROTOCOL    Bacteria in the mouth can travel directly into the bloodstream and infect your new joint. After surgery, it is recommended to wait at least 2 months before any dental work. Below are our recommendations for pre-medicating with antibiotics prior to dental visits and procedures for the following 2 years from surgery date. After 2 years the patient is not required to pre-medicate.     Routine Cleaning / Dental Procedures / Dental Surgeries:   Take 4 tablets of Amoxicillin 500mg (2 grams) one hour prior to dental appointment.  If allergic to penicillin - take Clindamycin 600mg one hour prior.      PREMEDICATE WITH ANTIBIOTIC PRIOR TO ANY OF THE FOLLOWING PROCEDURES    Sigmoidoscopy/colonoscopy, tonsillectomy, bronchoscopy, liver biopsy, genitourinary instrumentation, prostate or bladder surgery, kidney surgery, GYN surgery, and barium enema       Home Health Provider  Sometimes managing your health at home requires assistance.  The [facility] team has recognized your preference to use [facility].  They can be reached by phone  at [phone].  The fax number for your reference is [phone].. A representative from the home health agency will contact you or your family to schedule your first visit.

## 2025-04-16 NOTE — ANESTHESIA PREPROCEDURE EVALUATION
PRE-OP EVALUATION    Patient Name: Carolina Allison    Admit Diagnosis: Primary osteoarthritis of right hip [M16.11]    Pre-op Diagnosis: Primary osteoarthritis of right hip [M16.11]    RIGHT ANTERIOR TOTAL HIP ARTHROPLASTY    Anesthesia Procedure: RIGHT ANTERIOR TOTAL HIP ARTHROPLASTY (Right)    Surgeons and Role:     * Charles Mckeon MD - Primary    Pre-op vitals reviewed.  Temp: 98.2 °F (36.8 °C)  Pulse: 76  Resp: 16  BP: 151/101  SpO2: 99 %  Body mass index is 33.64 kg/m².    Current medications reviewed.  Hospital Medications:  Current Medications[1]    Outpatient Medications:   Prescriptions Prior to Admission[2]    Allergies: Patient has no known allergies.      Anesthesia Evaluation    Patient summary reviewed.    Anesthetic Complications  (-) history of anesthetic complications         GI/Hepatic/Renal                                 Cardiovascular        Exercise tolerance: good     MET: >4    (+) obesity  (+) hypertension   (+) hyperlipidemia                                  Endo/Other           (+) hypothyroidism                (+) arthritis       Pulmonary  Comment: Allergy related mild congestion/cough.  Denies fever.  States always happens in spring.    (+) asthma                     Neuro/Psych      (+) depression                                Past Surgical History[3]  Social Hx on file[4]  History   Drug Use No     Available pre-op labs reviewed.  Lab Results   Component Value Date    WBC 6.5 01/30/2025    RBC 4.64 01/30/2025    HGB 14.6 01/30/2025    HCT 44.1 01/30/2025    MCV 95.0 01/30/2025    MCH 31.5 01/30/2025    MCHC 33.1 01/30/2025    RDW 14.0 01/30/2025    .0 01/30/2025     Lab Results   Component Value Date     01/30/2025    K 4.8 01/30/2025     01/30/2025    CO2 28.0 01/30/2025    BUN 14 01/30/2025    CREATSERUM 0.84 01/30/2025     (H) 01/30/2025    CA 9.6 01/30/2025            Airway      Mallampati: II  Mouth opening: 3 FB  TM distance: > 6 cm  Neck ROM: full  Cardiovascular    Cardiovascular exam normal.  Rhythm: regular  Rate: normal  (-) murmur   Dental    Dentition appears grossly intact         Pulmonary      Breath sounds clear to auscultation bilaterally.               Other findings              ASA: 2   Plan: spinal  NPO status verified and patient meets guidelines.        Comment: Spinal discussed.  Risk of HA, infection, bleeding, failed block explained.  All questions answered.    Plan/risks discussed with: patient and spouse                Present on Admission:  **None**             [1]    acetaminophen (Tylenol Extra Strength) tab 1,000 mg  1,000 mg Oral Once    lactated ringers infusion   Intravenous Continuous    tranexamic acid in sodium chloride 0.7% (Cyklokapron) 1000 mg/100mL infusion premix 1,000 mg  1,000 mg Intravenous Once    ceFAZolin (Ancef) 2g in 10mL IV syringe premix  2 g Intravenous Once    ceFAZolin (Ancef) 2 g/10mL IV syringe premix        povidone-iodine (Betadine) 10 % 17.5 mL in sodium chloride 0.9 % 500 mL irrigation   Irrigation Once (Intra-Op)    clonidine/epinephrine/ropivacaine/ketorolac in 0.9% NaCl 60 mL pain cocktail syringe for hip arthroplasty   Infiltration Once (Intra-Op)   [2]   Medications Prior to Admission   Medication Sig Dispense Refill Last Dose/Taking    losartan 25 MG Oral Tab Take 2 tablets (50 mg total) by mouth daily.   4/15/2025 at  8:00 AM    atorvastatin 20 MG Oral Tab Take 1 tablet (20 mg total) by mouth nightly. 90 tablet 0 4/15/2025 at  7:00 PM    CLINDAMYCIN PHOS-BENZOYL PEROX 1-5 % External Gel APPLY TOPICALLY TO THE AFFECTED AREA TWICE DAILY 50 g 6 4/15/2025 at  8:00 AM    buPROPion  MG Oral Tablet 24 Hr Take 1 tablet (150 mg total) by mouth every morning. 90 tablet 2 4/16/2025 at  3:00 AM    LEVOTHYROXINE 200 MCG Oral Tab TAKE 1 TABLET DAILY 90 tablet 3 4/16/2025 at  3:00 AM    CITALOPRAM 40 MG Oral Tab TAKE 1 TABLET DAILY 90 tablet 3 4/16/2025 at  3:00 AM    hydroCHLOROthiazide 12.5 MG Oral Tab  Take 1 tablet (12.5 mg total) by mouth daily. 90 tablet 5 2025 at  3:00 AM    traMADol 50 MG Oral Tab Take 1 tablet (50 mg total) by mouth every 6 (six) hours as needed for Pain. 100 tablet 0 2025    MELOXICAM 15 MG Oral Tab TAKE 1 TABLET (15 MG TOTAL) BY MOUTH DAILY. 30 tablet 0 2025    amphetamine-dextroamphetamine (ADDERALL) 5 MG Oral Tab Take 1 tablet (5 mg total) by mouth daily. 30 tablet 0 More than a month    amphetamine-dextroamphetamine ER (ADDERALL XR) 30 MG Oral Capsule SR 24 Hr Take 1 capsule (30 mg total) by mouth every morning. 30 capsule 0 4/15/2025 at  8:00 AM    albuterol 108 (90 Base) MCG/ACT Inhalation Aero Soln USE 2 INHALATIONS EVERY 4 HOURS AS NEEDED FOR WHEEZING 3 each 3 More than a month   [3]   Past Surgical History:  Procedure Laterality Date                X2    Colonoscopy N/A 2017    Procedure: COLONOSCOPY, POSSIBLE BIOPSY, POSSIBLE POLYPECTOMY 44902;  Surgeon: Noé Suárez DO;  Location: [facility]    Hip arthroplasty Left     So biopsy stereo nodule 2 site bilat (cpt=[id number])      Other surgical history      partial thyroidectomy    Other surgical history      BACK SURGERY    Other surgical history      FEET SURGERY   [4]   Social History  Socioeconomic History    Marital status:    Tobacco Use    Smoking status: Every Day     Current packs/day: 1.00     Average packs/day: 1 pack/day for 20.0 years (20.0 ttl pk-yrs)     Types: Cigarettes    Smokeless tobacco: Never    Tobacco comments:     10 cigarettes/day   Vaping Use    Vaping status: Never Used   Substance and Sexual Activity    Alcohol use: Yes     Comment: social    Drug use: No

## 2025-04-16 NOTE — CM/SW NOTE
04/16/25 1400   CM/SW Referral Data   Referral Source Physician   Reason for Referral Discharge planning   Informant Patient   Patient Info   Patient's Current Mental Status at Time of Assessment Alert;Oriented   Patient's Home Environment House   Number of Levels in Home 2  (stair lift)   Patient lives with Spouse/Significant other   Patient Status Prior to Admission   Independent with ADLs and Mobility Yes   Discharge Needs   Anticipated D/C needs Home health care   Choice of Post-Acute Provider   Informed patient of right to choose their preferred provider Yes   List of appropriate post-acute services provided to patient/family with quality data Yes   Patient/family choice [facility]     Met w/pt and her  to provide Aidin info on [facility] and they are agreeable.  Pt reports they live in a 2 story home, but they brought a bed to the first level.  In addition, they have a stair lift that was installed for her mother.   She has a walker at home, but does not use any assistive devices at baseline    [facility] SOC planned for 4/18/25    / to remain available for support and/or discharge planning.     Radha POLANCOA MSN, RN Case Manager  [id number]

## 2025-04-16 NOTE — ANESTHESIA PROCEDURE NOTES
Airway  Date/Time: 4/16/2025 7:46 AM  Reason: elective      General Information and Staff   Patient location during procedure: OR  Anesthesiologist: Brenden Aranda MD  Performed: anesthesiologist   Performed by: Brenden Aranda MD  Authorized by: Brenden Aranda MD        Indications and Patient Condition  Indications for airway management: anesthesia  Sedation level: deep      Preoxygenated: yesPatient position: sniffing    Mask difficulty assessment: 1 - vent by mask    Final Airway Details    Final airway type: supraglottic airway      Successful airway: classic  Size: 3     Number of attempts at approach: 1

## 2025-04-17 VITALS
SYSTOLIC BLOOD PRESSURE: 148 MMHG | HEART RATE: 82 BPM | OXYGEN SATURATION: 90 % | WEIGHT: 214.81 LBS | DIASTOLIC BLOOD PRESSURE: 85 MMHG | RESPIRATION RATE: 18 BRPM | BODY MASS INDEX: 33.72 KG/M2 | HEIGHT: 67 IN | TEMPERATURE: 98 F

## 2025-04-17 PROBLEM — Z01.818 PRE-OP TESTING: Status: ACTIVE | Noted: 2025-04-17

## 2025-04-17 PROCEDURE — 99214 OFFICE O/P EST MOD 30 MIN: CPT | Performed by: HOSPITALIST

## 2025-04-17 NOTE — PLAN OF CARE
Patient A&OX4, VSS on RA. Patient reports pain to right hip; PO pain medications given. Prevena intact. Gel ice as tolerated. Up with SBA and RW. Plan to DC home with [facility]. Plan of care reviewed with patient. Patient demonstrates understanding.

## 2025-04-17 NOTE — PHYSICAL THERAPY NOTE
PHYSICAL THERAPY TREATMENT NOTE - INPATIENT    Room Number: 376/376-A     Session: 1     Number of Visits to Meet Established Goals: 1    Presenting Problem: s/p R JAKI  Co-Morbidities : OA, HLD, HTN, hypothyroidism, asthma, depression, hx of back sx, L JAKI    PHYSICAL THERAPY MEDICAL/SOCIAL HISTORY  History related to current admission: Patient is a 59 year old female admitted on 4/16/2025 from home for s/p R JAKI on 4/16/25.       HOME SITUATION  Type of Home: House   Home Layout: Two level, Able to live on main level  Stairs to Enter : 0  Stairs to Bedroom:  (stair lift, flight of stairs (moved bed to first floor though))  Railing: Yes  Lives With: Spouse (Mom)  Drives: Yes  Patient Regularly Uses: None     Prior Level of [place]: Pt is independent without the use of an assistive device for all ADL's and ambulation. Pt reports that she has canes and Rw's at home for use. Pt reports that she has a stair lift at home for her mother but typically takes the stairs. However, pt reports that she moved her bed to the first floor and will not need to take the stairs to her bedroom.    PHYSICAL THERAPY ASSESSMENT     Patient is currently functioning at baseline with bed mobility, transfers, and gait.    Patient currently does not meet criteria for skilled inpatient physical therapy services, however patient will continue to benefit from QID ambulation with nursing staff.  Pt is discharged from IP PT services.  RN aware to re-order if there is a decline in mobility status.      Patient continues to benefit from continued skilled PT services: at discharge to promote prior level of function.  Anticipate patient will return home with home health PT.    PLAN DURING HOSPITALIZATION  Nursing Mobility Recommendation : 1 Assist  PT Device Recommendation: Rolling walker  PT Treatment Plan: Bed mobility, Endurance, Energy conservation, Patient education, Family education, Gait training, Stair training, Transfer training, Balance  training  Frequency (Obs): Daily     CURRENT GOALS     Goal #1  Patient is able to demonstrate supine - sit EOB @ level: supervision      Goal #2  Patient is able to demonstrate transfers Sit to/from Stand at assistance level: supervision   Goal #3     Patient is able to ambulate 150 feet with assistive device at assistance level: supervision   Goal #4     Patient will negotiate 4 stairs/one curb w/ assistive device and supervision - goal deferred d/t patient preference    Goal #5     Patient verbalizes and/or demonstrates all precautions and safety concerns independently    Goal #6        Goal Comments: Goals established on 4/16/2025 4/17/2025 all goals achieved     SUBJECTIVE  \"Yeah I'm a pro!\"    OBJECTIVE  Precautions: Bed/chair alarm, JAKI - anterior (wound vac)    WEIGHT BEARING RESTRICTION  R Lower Extremity: Weight Bearing as Tolerated    PAIN ASSESSMENT   Rating: 3  Location: R anterior hip  Management Techniques: Activity promotion, Repositioning    BALANCE                                                                                                                       Static Sitting: Good  Dynamic Sitting: Good           Static Standing: Fair -  Dynamic Standing: Poor +    ACTIVITY TOLERANCE                         O2 WALK       AM-PAC '6-Clicks' INPATIENT SHORT FORM - BASIC MOBILITY  How much difficulty does the patient currently have...  Patient Difficulty: Turning over in bed (including adjusting bedclothes, sheets and blankets)?: None   Patient Difficulty: Sitting down on and standing up from a chair with arms (e.g., wheelchair, bedside commode, etc.): None   Patient Difficulty: Moving from lying on back to sitting on the side of the bed?: None   How much help from another person does the patient currently need...   Help from Another: Moving to and from a bed to a chair (including a wheelchair)?: None   Help from Another: Need to walk in hospital room?: None   Help from Another: Climbing 3-5  steps with a railing?: A Little     AM-PAC Score:  Raw Score: 23   Approx Degree of Impairment: 11.2%   Standardized Score (AM-PAC Scale): 56.93   CMS Modifier (G-Code): CI    FUNCTIONAL ABILITY STATUS  Gait Assessment   Functional Mobility/Gait Assessment  Gait Assistance: Modified independent  Distance (ft): 500  Assistive Device: Rolling walker  Pattern: R Decreased stance time  Stairs:  (denied need for stairs - moved bed to first floor)    Skilled Therapy Provided    Bed Mobility:  Rolling: Mod I   Supine<>Sit: Mod I   Sit<>Supine: NT     Transfer Mobility:  Sit<>Stand: Mod I   Stand<>Sit: Mod I   Gait: Mod I with RW    Therapist's Comments: Pt aware of hip precautions - motivated to complete HEP. Reviewed 4/17/2025       THERAPEUTIC EXERCISES  Lower Extremity Ankle pumps     Upper Extremity  - open/close     Position Sitting     Repetitions   20   Sets   1     Patient End of Session: Up in chair, Needs met, Call light within reach, RN aware of session/findings, All patient questions and concerns addressed    PT Session Time: 15 minutes  Gait Training: 15 minutes  Therapeutic Activity: 0 minutes  Therapeutic Exercise: 0 minutes   Neuromuscular Re-education: 0 minutes

## 2025-04-17 NOTE — PROGRESS NOTES
[facility]   part of [facility]     Hospitalist Progress Note     Carolina Allison Patient Status:  Outpatient in a Bed    1965 MRN [id number]   Location [facility] 3SW-A Attending Charles Mckeon MD   Hosp Day # 0 PCP Cleve Herrera MD     Chief Complaint: medical management of hypertension    Subjective:     Patient has no complaints, feels well    Objective:    Review of Systems:   A comprehensive review of systems was completed; pertinent positive and negatives stated in subjective.    Vital signs:  Temp:  [98.2 °F (36.8 °C)-98.4 °F (36.9 °C)] 98.4 °F (36.9 °C)  Pulse:  [63-90] 82  Resp:  [13-21] 18  BP: (114-158)/() 148/85  SpO2:  [90 %-97 %] 90 %    Physical Exam:    General: No acute distress  Respiratory: No wheezes, no rhonchi  Cardiovascular: S1, S2, regular rate and rhythm  Abdomen: Soft, Non-tender, non-distended, positive bowel sounds  Neuro: No new focal deficits.   Extremities: No edema      Diagnostic Data:    Labs:  No results for input(s): \"WBC\", \"HGB\", \"MCV\", \"PLT\", \"BAND\", \"INR\" in the last 168 hours.    Invalid input(s): \"LYM#\", \"MONO#\", \"BASOS#\", \"EOSIN#\"    No results for input(s): \"GLU\", \"BUN\", \"CREATSERUM\", \"GFRAA\", \"GFRNAA\", \"CA\", \"ALB\", \"NA\", \"K\", \"CL\", \"CO2\", \"ALKPHO\", \"AST\", \"ALT\", \"BILT\", \"TP\" in the last 168 hours.    eGFR cannot be calculated (Patient's most recent lab result is older than the maximum 7 days allowed.).    No results for input(s): \"TROP\", \"TROPHS\", \"CK\" in the last 168 hours.    No results for input(s): \"PTP\", \"INR\" in the last 168 hours.               Microbiology    No results found for this visit on 25.      Imaging: Reviewed in Epic.    Medications: Scheduled Medications[1]    Assessment & Plan:      #Hypertension  -BP stable  -reume PTA meds    #Hypothyroidism  -levothyroxine    #Depression  -mood stable, controlled    Stable for dc from hospitalist standpoint      Brenda Erickson MD    Supplementary Documentation:     Quality:  DVT  Mechanical Prophylaxis:   SCDs, Early ambuation  DVT Pharmacologic Prophylaxis   Medication   None         DVT Pharmacologic prophylaxis: Aspirin 81 mg      Code Status: Full Code  Hoff: No urinary catheter in place  Hoff Duration (in days):   Central line:    VALERY: 4/17/2025    Discharge is dependent on: progress  At this point Ms. Allison is expected to be discharge to: home    The 21st Century Cures Act makes medical notes like these available to patients in the interest of transparency. Please be advised this is a medical document. Medical documents are intended to carry relevant information, facts as evident, and the clinical opinion of the practitioner. The medical note is intended as peer to peer communication and may appear blunt or direct. It is written in medical language and may contain abbreviations or verbiage that are unfamiliar.                         [1]    sennosides  17.2 mg Oral Nightly    docusate sodium  100 mg Oral BID    ferrous sulfate  325 mg Oral Daily with breakfast    aspirin  81 mg Oral BID    acetaminophen  1,000 mg Oral Q8H    celecoxib  100 mg Oral BID    atorvastatin  20 mg Oral Nightly    buPROPion ER  150 mg Oral QAM    escitalopram  20 mg Oral Daily    levothyroxine  200 mcg Oral QAM AC

## 2025-04-17 NOTE — PROGRESS NOTES
AVS reviewed, IV removed , discharge video viewed, will dc home w/ [facility], Prevena wound vac in place- info packet given , spouse at bedside, both verbalized understanding of discharge instructions.

## 2025-04-17 NOTE — OCCUPATIONAL THERAPY NOTE
OCCUPATIONAL THERAPY EVALUATION - INPATIENT    Room Number: 376/376-A  Evaluation Date: 4/17/2025     Type of Evaluation: Initial  Presenting Problem: s/p R JAKI 4/16/25    Physician Order: IP Consult to Occupational Therapy  Reason for Therapy:  ADL/IADL Dysfunction and Discharge Planning    OCCUPATIONAL THERAPY ASSESSMENT   Patient is a 59 year old female admitted on 4/16/2025 with Presenting Problem: s/p R JAKI 4/16/25. Co-Morbidities : OA, HLD, HTN, hypothyroidism, asthma, depression, hx of back sx, L JAKI  Patient is currently functioning near baseline with ADLs and functional transfers.  Prior to admission, patient's baseline is independent.  Patient met all OT goals at supervision to Mod I level.  Patient reports no further questions/concerns at this time.     No further skilled OT needs at this time.    Recommendations for nursing staff:   Transfers: 1-person  Toileting location: Toilet    EVALUATION SESSION:  Patient at start of session: bathroom    FUNCTIONAL TRANSFER ASSESSMENT  Sit to Stand: Chair; Edge of Bed  Edge of Bed: Supervision  Chair: Supervision  Toilet Transfer: Supervision (light use of grab bar, reports counter adjacent at home)    BED MOBILITY  Supine to Sit : Modified Independent  Sit to Supine (OT): Modified Independent    BALANCE ASSESSMENT     FUNCTIONAL ADL ASSESSMENT  Grooming Standing: Modified Independent  UB Dressing Seated: Modified Independent  LB Dressing Seated: Supervision (without AE)  LB Dressing Standing: Supervision  Toileting Seated: Supervision  Toileting Standing: Supervision    ACTIVITY TOLERANCE: WFL                         O2 SATURATIONS       COGNITION  Overall Cognitive Status:  WFL - within functional limits    COGNITION ASSESSMENTS     Upper Extremity:   ROM: within functional limits   Strength: is within functional limits     EDUCATION PROVIDED  Patient Education : Role of Occupational Therapy; Functional Transfer Techniques; Fall Prevention; Weight Bear Status;  Posture/Positioning  Patient's Response to Education: Verbalized Understanding ( also present)    Equipment used: RW  Demonstrates functional use    Therapist comments: Patient motivated and participatory. Educated on WBAT/safety precautions and incorporation into ADLs and transfers, followed with good return demo. Patient performed all ADLs and functional transfers at Supervision to Mod I level. Patient aware of recommendation for initial supervision at discharge, including supervision for shower transfers and increased assist with IADLs; patient reports will have initial supervision/assist as needed from  at discharge. Patient reports no further questions or concerns regarding discharge home to ADLs.     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 Enjoys caring for her mother and nearly 1yo grandson.    SUBJECTIVE  \"I'm feeling pretty good\"    PAIN ASSESSMENT  Rating: Unable to rate  Location: mild R hip  Management Techniques: Activity promotion, Repositioning, Ice, Body mechanics    OBJECTIVE  Precautions: Bed/chair alarm, JAKI - anterior (wound vac)  Fall Risk: High fall risk    WEIGHT BEARING RESTRICTION  R Lower Extremity: Weight Bearing as Tolerated      AM-PAC ‘6-Clicks’ Inpatient Daily Activity Short Form  -   Putting on and taking off regular lower body clothing?: A Little  (supervision)  -   Bathing (including washing, rinsing, drying)?: A Little (supervision)  -   Toileting, which includes using toilet, bedpan or urinal? : A Little (supervision)  -   Putting on and taking off regular upper body clothing?: None  -   Taking care of personal grooming such as brushing teeth?: None  -   Eating meals?: None    AM-PAC Score:  Score: 21  Approx Degree of Impairment: 32.79%  Standardized Score (AM-PAC Scale): 44.27    ADDITIONAL TESTS     NEUROLOGICAL FINDINGS      PLAN   Patient has been evaluated and presents with no skilled Occupational Therapy needs at this time.  Patient discharged from Occupational Therapy services.  Please re-order if a new functional limitation presents during this admission.    OT Device Recommendations: None    Patient Evaluation Complexity Level:   Occupational Profile/Medical History LOW - Brief history including review of medical or therapy records    Specific performance deficits impacting engagement in ADL/IADL LOW  1 - 3 performance deficits    Client Assessment/Performance Deficits LOW - No comorbidities nor modifications of tasks    Clinical Decision Making LOW - Analysis of occupational profile, problem-focused assessments, limited treatment options    Overall Complexity LOW     OT Session Time: 15 minutes

## 2025-04-17 NOTE — CM/SW NOTE
04/17/25 1000   Discharge disposition   Expected discharge disposition Home-Health   Post Acute Care Provider   ([facility])   Discharge transportation Private car     Noted pt discharging today.  Sent updates to [facility] and message to notify them of discharge today    / to remain available for support and/or discharge planning.     Radha Mcgee MBA MSN, RN Case Manager  [phone]

## 2025-04-17 NOTE — PROGRESS NOTES
[facility]    Subjective: Overall doing well. Pain is controlled on current medications. Denies any weakness, numbness or tingling. Has gotten up with PT yesterday and walked about 125 ft with a rolling walker    Voided on their own since surgery. Tolerating PO without N/V. Denies any CP or SOB    Objective: s/p total hip arthroplasty    Physical Exam:  Vitals:    04/17/25 0800   BP: 148/85   Pulse: 82   Resp: 18   Temp: 98.4 °F (36.9 °C)       Constitutional: No acute distress  Eyes: Anicteric sclera, pink conjunctiva  Ears, Nose, Mouth and Throat: Normocephalic, atraumatic, moist mucous membranes  Cardiovascular: RRR by peripheral pulse  Respiratory: No respiratory distress, normal respiratory rhythm and effort   Neurological:  Oriented to person, place, and time.  Psychological:  Appropriate mood and affect.    Right Hip Exam:  Right hip Prevena dressing appears clean, dry, and intact  Compartments soft and compressible  Can actively SLR  Able to fire TA/GS/EHL  Warm perfused appearing extremity    Imaging: PACU AP Pelvis film personally viewed, independently interpreted and radiology report read. S/p press fit total hip arthroplasty. Components appear in stable position from intra-operative films with restoration of length. Slightly increased offset compared to other hip. No dislocation or overt fracture noted.      Assessment/Plan: Carolina Allison is a 59 year old female postop day #1 status post total hip arthroplasty  Overall doing well    Activity: as tolerated, WBAT with anterior hip precautions  Physical therapy for gait training and safe discharge  Anticoagulation: Aspirin 81 BID x 4w, SCDs, mobilization  Analgesia: Multimodal  Antibiotics: Perioperative IV Ancef completed, home with Rx for PO antibiotics x 10d given recent smoking cessation history  Dressing: maintain Prevena until POD#7 and to be removed and changed by home health nurse  Diet: baseline, high protein, good  hydration  Disposition: home with home health today once cleared by PT    Charles Mckeon MD  Adult Hip and Knee Reconstruction    Department of Orthopaedic Surgery  [facility]     [address] [place] [id number]  [address] [place] [address]     t: [phone]  f: [phone]       [facility]

## 2025-04-17 NOTE — PLAN OF CARE
A&Ox4. VSS on RA. /IS. SCDs, ankle pumps encouraged. Tolerating diet. Voiding freely. Pain mananged with current medications. Provena/dressing to R hip C/D/I. Up standby w/ walker. PT/OT to see tmrw. Patient updated on POC, verbalized agreement. Safety precautions in place, pt instructed to call for assistance, call light within reach.

## 2025-05-01 ENCOUNTER — TELEPHONE (OUTPATIENT)
Facility: CLINIC | Age: 60
End: 2025-05-01

## 2025-05-01 DIAGNOSIS — M25.551 PAIN OF RIGHT HIP: Primary | ICD-10-CM

## 2025-05-02 ENCOUNTER — HOSPITAL ENCOUNTER (OUTPATIENT)
Dept: GENERAL RADIOLOGY | Age: 60
Discharge: HOME | End: 2025-05-02
Attending: STUDENT IN AN ORGANIZED HEALTH CARE EDUCATION/TRAINING PROGRAM
Payer: COMMERCIAL

## 2025-05-02 ENCOUNTER — OFFICE VISIT (OUTPATIENT)
Dept: ORTHOPEDICS CLINIC | Facility: CLINIC | Age: 60
End: 2025-05-02
Payer: COMMERCIAL

## 2025-05-02 VITALS — WEIGHT: 210 LBS | HEIGHT: 67 IN | BODY MASS INDEX: 32.96 KG/M2

## 2025-05-02 DIAGNOSIS — Z96.641 S/P TOTAL RIGHT HIP ARTHROPLASTY: Primary | ICD-10-CM

## 2025-05-02 DIAGNOSIS — J45.20 MILD INTERMITTENT REACTIVE AIRWAY DISEASE WITHOUT COMPLICATION (HCC): ICD-10-CM

## 2025-05-02 DIAGNOSIS — M25.551 PAIN OF RIGHT HIP: ICD-10-CM

## 2025-05-02 PROCEDURE — 73502 X-RAY EXAM HIP UNI 2-3 VIEWS: CPT | Performed by: STUDENT IN AN ORGANIZED HEALTH CARE EDUCATION/TRAINING PROGRAM

## 2025-05-02 PROCEDURE — 3008F BODY MASS INDEX DOCD: CPT | Performed by: STUDENT IN AN ORGANIZED HEALTH CARE EDUCATION/TRAINING PROGRAM

## 2025-05-02 PROCEDURE — 99024 POSTOP FOLLOW-UP VISIT: CPT | Performed by: STUDENT IN AN ORGANIZED HEALTH CARE EDUCATION/TRAINING PROGRAM

## 2025-05-02 NOTE — PROGRESS NOTES
Orthopedic Surgery - Total Joint Arthroplasty    Post-Op Visit    DOS 4/16/25    Subjective:    Chief Complaint: Follow-up after Right anterior total hip arthroplasty    Patient is 2 weeks status post the above procedure, doing well and has had a stable post-operative course thus far.  She was able to go to [place] yesterday and felt good other than some muscle soreness afterwards. Pain is well controlled and improved compared to prior to surgery. She will take Tylenol if needed. They have not noticed any problems with the wound, no drainage. She has remained off cigarettes since about 6 weeks prior to surgery.    She has weaned off of her assistive device.  Currently working with home physical therapy.    Objective:    Vitals:    05/02/25 0918   Weight: 210 lb (95.3 kg)   Height: 5' 7\" (1.702 m)     Estimated body mass index is 32.89 kg/m² as calculated from the following:    Height as of this encounter: 5' 7\" (1.702 m).    Weight as of this encounter: 210 lb (95.3 kg).    Physical Examination:   Gen: Well developed, well nourished, resting comfortably in no acute distress  Skin: Warm, dry  Head: Normocephalic, atraumatic  Ears/Nose/Throat: Moist mucous membranes  CV: Regular rate and rhythm by peripheral pulse  Resp: Respirations are non-labored, no wheezing    MSK:   Incision appears to be healing appropriately. No active drainage. No erythema or wound dehiscence. Nylon sutures intact proximally  No pain with gentle internal and external rotation at the hip  Non-tender along the incision  Patient is neurovascularly intact L2-S1 and 5/5 strength with resisted testing of DF/PF/EHL  No calf pain, redness or swelling  Warm perfused extremity    Gait: non-antalgic, good lico without assistive devices    Imaging:  Weightbearing XRs AP Pelvis and 2 views AP and Cross Table Lat of the right Hip were personally reviewed and interpreted    Patient is s/p press-fit total hip arthroplasty  Components appear in stable  position when compared to PACU film. May be some slight settling of the stem based on the collar when compared to intra-op flouro films, but stable from PACU films  No fracture or dislocation noted      Assessment / Plan:  59-year-old female with right hip osteoarthritis status post right total hip arthroplasty.  Appears to be doing well with her wound healing. She was given a course of antibiotics on discharge given her recent smoking history, which she has completed    Proximal Nylon sutures were removed today after the wound was cleaned with Betadine and alcohol.  It remained well-approximated    Steri-Strips with Mastisol were applied along the wound.  Instructions were provided to maintain this until they fall off on their own in about 3 to 4 weeks.  If any edges curl she understands to trim them instead of pulling the entire Steri-Strip off. She is okay to shower with them on , she should pat them dry afterward.     Prescriptions Given Today: None    Blood Thinner: Aspirin 81 mg BID    Physical Therapy: Referral provided.  Patient feels she is doing well and will think about whether she needs to go or not    Weight Bearing Status: As tolerated    Mobility Aid: None. Maintain fall precautions    Discussed: Wound care, Medication management, and Activity restrictions    Continue MEENAKSHI and RICE for the postoperative lower extremity to help with swelling    Follow-Up: 4 weeks (for 6 week visit) or sooner as needed for any questions or concerns. XRs at next visit      Charles Mckeon MD  Adult Hip and Knee Reconstruction    Department of Orthopaedic Surgery  [facility]     [address] [place] [address]  [address] [place] [address]     t: [phone]  f: [phone]       [facility]

## 2025-05-04 DIAGNOSIS — F98.8 ATTENTION DEFICIT DISORDER, UNSPECIFIED HYPERACTIVITY PRESENCE: ICD-10-CM

## 2025-05-05 RX ORDER — DEXTROAMPHETAMINE SACCHARATE, AMPHETAMINE ASPARTATE MONOHYDRATE, DEXTROAMPHETAMINE SULFATE AND AMPHETAMINE SULFATE 7.5; 7.5; 7.5; 7.5 MG/1; MG/1; MG/1; MG/1
30 CAPSULE, EXTENDED RELEASE ORAL EVERY MORNING
Qty: 30 CAPSULE | Refills: 0 | Status: SHIPPED | OUTPATIENT
Start: 2025-05-05

## 2025-05-05 RX ORDER — DEXTROAMPHETAMINE SACCHARATE, AMPHETAMINE ASPARTATE, DEXTROAMPHETAMINE SULFATE AND AMPHETAMINE SULFATE 1.25; 1.25; 1.25; 1.25 MG/1; MG/1; MG/1; MG/1
5 TABLET ORAL DAILY
Qty: 30 TABLET | Refills: 0 | Status: SHIPPED | OUTPATIENT
Start: 2025-05-05

## 2025-05-05 NOTE — TELEPHONE ENCOUNTER
Please let patient or caregiver know or leave message that refill request for the medication/s listed below has/have come to our office. The refills have been sent.    She is due for 6 month medication check. Please help schedule a video visit for this.    Thanks     Requested Prescriptions     Signed Prescriptions Disp Refills    amphetamine-dextroamphetamine (ADDERALL) 5 MG Oral Tab 30 tablet 0     Sig: Take 1 tablet (5 mg total) by mouth daily.     Authorizing Provider: MAXIMUS DAVIS    amphetamine-dextroamphetamine ER (ADDERALL XR) 30 MG Oral Capsule SR 24 Hr 30 capsule 0     Sig: Take 1 capsule (30 mg total) by mouth every morning.     Authorizing Provider: MAXIMUS DAVIS

## 2025-05-13 DIAGNOSIS — M16.11 PRIMARY OSTEOARTHRITIS OF RIGHT HIP: ICD-10-CM

## 2025-05-13 RX ORDER — CELECOXIB 200 MG/1
200 CAPSULE ORAL 2 TIMES DAILY
Qty: 60 CAPSULE | Refills: 0 | Status: SHIPPED | OUTPATIENT
Start: 2025-05-13

## 2025-05-13 NOTE — TELEPHONE ENCOUNTER
Celecoxib  200mg    DOS: 4/16/25 Right Hip Replacement   Last OV: 5/2/25  Last refill date: 4/16/25 #/refills: 60/0  Upcoming appt: No future appointments.       Ref Range & Units 4/10/25 10:39 AM   BUN  7 - 25 mg/dL 16   Creatinine  0.50 - 1.05 mg/dL 0.71   eGFR  > OR = 60 mL/min/1.73m2 97   BUN/Creatinine Ratio  6 - 22 (calc) SEE NOTE:   Comment:    Not Reported: BUN and Creatinine are within     reference range.

## 2025-05-14 RX ORDER — ALBUTEROL SULFATE 90 UG/1
INHALANT RESPIRATORY (INHALATION)
Qty: 25.5 EACH | Refills: 3 | Status: SHIPPED | OUTPATIENT
Start: 2025-05-14

## 2025-06-03 ENCOUNTER — MED REC SCAN ONLY (OUTPATIENT)
Facility: CLINIC | Age: 60
End: 2025-06-03

## 2025-06-09 DIAGNOSIS — M16.11 PRIMARY OSTEOARTHRITIS OF RIGHT HIP: ICD-10-CM

## 2025-06-09 RX ORDER — TRAMADOL HYDROCHLORIDE 50 MG/1
50 TABLET ORAL EVERY 6 HOURS PRN
Qty: 100 TABLET | Refills: 0 | Status: SHIPPED | OUTPATIENT
Start: 2025-06-09

## 2025-06-13 DIAGNOSIS — F98.8 ATTENTION DEFICIT DISORDER, UNSPECIFIED HYPERACTIVITY PRESENCE: ICD-10-CM

## 2025-06-13 RX ORDER — DEXTROAMPHETAMINE SACCHARATE, AMPHETAMINE ASPARTATE, DEXTROAMPHETAMINE SULFATE AND AMPHETAMINE SULFATE 1.25; 1.25; 1.25; 1.25 MG/1; MG/1; MG/1; MG/1
5 TABLET ORAL DAILY
Qty: 30 TABLET | Refills: 0 | Status: SHIPPED | OUTPATIENT
Start: 2025-06-13

## 2025-06-13 RX ORDER — DEXTROAMPHETAMINE SACCHARATE, AMPHETAMINE ASPARTATE MONOHYDRATE, DEXTROAMPHETAMINE SULFATE AND AMPHETAMINE SULFATE 7.5; 7.5; 7.5; 7.5 MG/1; MG/1; MG/1; MG/1
30 CAPSULE, EXTENDED RELEASE ORAL EVERY MORNING
Qty: 30 CAPSULE | Refills: 0 | Status: SHIPPED | OUTPATIENT
Start: 2025-06-13

## 2025-06-13 NOTE — TELEPHONE ENCOUNTER
Please let patient or caregiver know or leave message that refill request for the medication/s listed below has/have come to our office. The refills have been sent.    She is overdue for a 6 month f/u on the meds. Please help her schedule a VV in the next week or so.    Thanks     Requested Prescriptions     Signed Prescriptions Disp Refills    amphetamine-dextroamphetamine (ADDERALL) 5 MG Oral Tab 30 tablet 0     Sig: Take 1 tablet (5 mg total) by mouth daily.     Authorizing Provider: MAXIMUS DAVIS    amphetamine-dextroamphetamine ER (ADDERALL XR) 30 MG Oral Capsule SR 24 Hr 30 capsule 0     Sig: Take 1 capsule (30 mg total) by mouth every morning.     Authorizing Provider: MAXMIUS DAVIS

## 2025-07-16 DIAGNOSIS — F98.8 ATTENTION DEFICIT DISORDER, UNSPECIFIED HYPERACTIVITY PRESENCE: ICD-10-CM

## 2025-07-17 DIAGNOSIS — M16.11 PRIMARY OSTEOARTHRITIS OF RIGHT HIP: ICD-10-CM

## 2025-07-18 NOTE — TELEPHONE ENCOUNTER
Please review; protocol failed/ has no protocol      Fill dates for Adderall 5 mg       Recent fills: 06/13/2025,05/05/20/25,03/22/2025  Last Rx written: 06/13/2025  Last Office Visit: 09/19/2024    Recent Visits  Date Type Provider Dept   09/19/24 Office Visit Cleve Herrera MD [facility]     Fill dates for Adderall ER 30 mg     Recent fills: 06/13/2025,05/05/2025,03/22/2025  Last Rx written: 06/13/2025  Last Office Visit: 09/19/2024    Recent Visits  Date Type Provider Dept   09/19/24 Office Visit Cleve Herrera MD [facility]

## 2025-07-19 RX ORDER — DEXTROAMPHETAMINE SACCHARATE, AMPHETAMINE ASPARTATE, DEXTROAMPHETAMINE SULFATE AND AMPHETAMINE SULFATE 1.25; 1.25; 1.25; 1.25 MG/1; MG/1; MG/1; MG/1
5 TABLET ORAL DAILY
Qty: 30 TABLET | Refills: 0 | Status: SHIPPED | OUTPATIENT
Start: 2025-07-19

## 2025-07-19 RX ORDER — DEXTROAMPHETAMINE SACCHARATE, AMPHETAMINE ASPARTATE MONOHYDRATE, DEXTROAMPHETAMINE SULFATE AND AMPHETAMINE SULFATE 7.5; 7.5; 7.5; 7.5 MG/1; MG/1; MG/1; MG/1
30 CAPSULE, EXTENDED RELEASE ORAL EVERY MORNING
Qty: 30 CAPSULE | Refills: 0 | Status: SHIPPED | OUTPATIENT
Start: 2025-07-19

## 2025-07-19 RX ORDER — ATORVASTATIN CALCIUM 40 MG/1
40 TABLET, FILM COATED ORAL NIGHTLY
COMMUNITY
Start: 2025-07-02

## 2025-07-19 NOTE — TELEPHONE ENCOUNTER
Please let patient or caregiver know or leave message that refill request for the medication/s listed below has/have come to our office. The refills have been sent.    It has been over 6 months since I have seen her for the ADD medication. I need to do a video visit in the next few weeks. The LEONARDO is highly vigilante with these meds. I will not be able to refill the meds after this one without a visit.     Thanks     Requested Prescriptions     Signed Prescriptions Disp Refills    amphetamine-dextroamphetamine (ADDERALL) 5 MG Oral Tab 30 tablet 0     Sig: Take 1 tablet (5 mg total) by mouth daily.     Authorizing Provider: MAXIMUS DAVIS    amphetamine-dextroamphetamine ER (ADDERALL XR) 30 MG Oral Capsule SR 24 Hr 30 capsule 0     Sig: Take 1 capsule (30 mg total) by mouth every morning.     Authorizing Provider: MAXIMUS DAVIS

## 2025-07-21 RX ORDER — TRAMADOL HYDROCHLORIDE 50 MG/1
50 TABLET ORAL EVERY 6 HOURS PRN
Qty: 100 TABLET | Refills: 0 | Status: SHIPPED | OUTPATIENT
Start: 2025-07-21

## 2025-07-21 NOTE — TELEPHONE ENCOUNTER
Please review. Refill failed protocol.     Recent fills each #100 : 6/9/25 , #40 : 5/6/25 , #100 : 4/9/25  Last prescription written: 6/9/25  Last office visit:  9/19/2024    No future appointments.      details… detailed exam mouth

## 2025-07-28 DIAGNOSIS — I10 ESSENTIAL (PRIMARY) HYPERTENSION: ICD-10-CM

## 2025-07-28 RX ORDER — LOSARTAN POTASSIUM 25 MG/1
50 TABLET ORAL DAILY
Qty: 180 TABLET | Refills: 0 | Status: SHIPPED | OUTPATIENT
Start: 2025-07-28

## 2025-08-22 DIAGNOSIS — F98.8 ATTENTION DEFICIT DISORDER, UNSPECIFIED HYPERACTIVITY PRESENCE: ICD-10-CM

## 2025-08-22 DIAGNOSIS — M16.11 PRIMARY OSTEOARTHRITIS OF RIGHT HIP: ICD-10-CM

## 2025-08-22 RX ORDER — DEXTROAMPHETAMINE SACCHARATE, AMPHETAMINE ASPARTATE, DEXTROAMPHETAMINE SULFATE AND AMPHETAMINE SULFATE 1.25; 1.25; 1.25; 1.25 MG/1; MG/1; MG/1; MG/1
5 TABLET ORAL DAILY
Qty: 30 TABLET | Refills: 0 | OUTPATIENT
Start: 2025-08-22

## 2025-08-22 RX ORDER — DEXTROAMPHETAMINE SACCHARATE, AMPHETAMINE ASPARTATE MONOHYDRATE, DEXTROAMPHETAMINE SULFATE AND AMPHETAMINE SULFATE 7.5; 7.5; 7.5; 7.5 MG/1; MG/1; MG/1; MG/1
30 CAPSULE, EXTENDED RELEASE ORAL EVERY MORNING
Qty: 30 CAPSULE | Refills: 0 | OUTPATIENT
Start: 2025-08-22

## 2025-08-22 RX ORDER — TRAMADOL HYDROCHLORIDE 50 MG/1
50 TABLET ORAL EVERY 6 HOURS PRN
Qty: 100 TABLET | Refills: 0 | OUTPATIENT
Start: 2025-08-22

## 2025-08-24 ENCOUNTER — PATIENT MESSAGE (OUTPATIENT)
Dept: FAMILY MEDICINE CLINIC | Facility: CLINIC | Age: 60
End: 2025-08-24

## 2025-08-25 RX ORDER — ATORVASTATIN CALCIUM 40 MG/1
40 TABLET, FILM COATED ORAL NIGHTLY
Qty: 30 TABLET | Refills: 0 | Status: SHIPPED | OUTPATIENT
Start: 2025-08-25

## 2025-08-27 ENCOUNTER — TELEMEDICINE (OUTPATIENT)
Dept: FAMILY MEDICINE CLINIC | Facility: CLINIC | Age: 60
End: 2025-08-27

## 2025-08-27 DIAGNOSIS — E78.5 HYPERLIPIDEMIA, UNSPECIFIED HYPERLIPIDEMIA TYPE: ICD-10-CM

## 2025-08-27 DIAGNOSIS — R41.840 ATTENTION OR CONCENTRATION DEFICIT: ICD-10-CM

## 2025-08-27 DIAGNOSIS — E03.9 HYPOTHYROIDISM, UNSPECIFIED TYPE: Primary | ICD-10-CM

## 2025-08-27 RX ORDER — DEXTROAMPHETAMINE SACCHARATE, AMPHETAMINE ASPARTATE MONOHYDRATE, DEXTROAMPHETAMINE SULFATE AND AMPHETAMINE SULFATE 7.5; 7.5; 7.5; 7.5 MG/1; MG/1; MG/1; MG/1
30 CAPSULE, EXTENDED RELEASE ORAL EVERY MORNING
Qty: 30 CAPSULE | Refills: 0 | Status: SHIPPED | OUTPATIENT
Start: 2025-08-27

## 2025-08-27 RX ORDER — DEXTROAMPHETAMINE SACCHARATE, AMPHETAMINE ASPARTATE, DEXTROAMPHETAMINE SULFATE AND AMPHETAMINE SULFATE 1.25; 1.25; 1.25; 1.25 MG/1; MG/1; MG/1; MG/1
5 TABLET ORAL DAILY
Qty: 30 TABLET | Refills: 0 | Status: SHIPPED | OUTPATIENT
Start: 2025-08-27

## (undated) DEVICE — BONE SCREW 6.5X30 SELF-TAP
Type: IMPLANTABLE DEVICE | Site: HIP | Status: NON-FUNCTIONAL
Removed: 2017-11-20

## (undated) DEVICE — GOWN,SIRUS,FABRIC-REINFORCED,X-LARGE: Brand: MEDLINE

## (undated) DEVICE — 3M™ STERI-DRAPE™ U-DRAPE 1015: Brand: STERI-DRAPE™

## (undated) DEVICE — GLOVE SUR 7.5 SENSICARE PI PIP GRN PWD F

## (undated) DEVICE — SUT STRATAFIX SYMMTRC PDS + 1 L12IN ABSRB

## (undated) DEVICE — GLOVE SUR 6.5 SENSICARE PI PIP CRM PWD F

## (undated) DEVICE — BANDAGE,COHESIVE,TAN,4X5YD,LF,STRL: Brand: MEDLINE

## (undated) DEVICE — HOOD: Brand: FLYTE

## (undated) DEVICE — ELECTRODE ES L16.5CM BLDE MPLR OPN APPRCH EZ

## (undated) DEVICE — GOWN,SIRUS,FABRNF,XL,20/CS: Brand: MEDLINE

## (undated) DEVICE — BIPOLAR SEALER 23-112-1 AQM 6.0: Brand: AQUAMANTYS™

## (undated) DEVICE — SUT ETHLN 3-0 18IN PS-1 NABSRB BLK 24MM 3/8 C

## (undated) DEVICE — APPLICATOR PREP 10.5ML ORNG CHG 2% ISO ALC

## (undated) DEVICE — ANTIBACTERIAL UNDYED BRAIDED (POLYGLACTIN 910), SYNTHETIC ABSORBABLE SUTURE: Brand: COATED VICRYL

## (undated) DEVICE — CONTAINER,SPECIMEN,PNEU TUBE,4OZ,OR STRL: Brand: MEDLINE

## (undated) DEVICE — SYRINGE MED 30ML STD CLR PLAS LL TIP N CTRL

## (undated) DEVICE — NEPTUNE E-SEP SMOKE EVACUATION PENCIL, COATED, 70MM BLADE, PUSH BUTTON SWITCH: Brand: NEPTUNE E-SEP

## (undated) DEVICE — Device: Brand: STABLECUT®

## (undated) DEVICE — KENDALL SCD EXPRESS SLEEVES, KNEE LENGTH, MEDIUM: Brand: KENDALL SCD

## (undated) DEVICE — GLOVE SUR 7.5 SENSICARE PI PIP CRM PWD F

## (undated) DEVICE — CATHETER URETH 10FR INTMIT RED RUB

## (undated) DEVICE — C-ARM: Brand: UNBRANDED

## (undated) DEVICE — BLADE ELECTRODE: Brand: EDGE

## (undated) DEVICE — MLPD DISPOSABLE PAD (6' ROLL) 3 ROLLS: Brand: SCHAERER MEDICAL USA

## (undated) DEVICE — STRYKER PERFORMANCE SERIES SAGITTAL BLADE: Brand: STRYKER PERFORMANCE SERIES

## (undated) DEVICE — CHLORAPREP 26ML APPLICATOR

## (undated) DEVICE — GLOVE SUR 6.5 SENSICARE PI PIP GRN PWD F

## (undated) DEVICE — NEEDLE SPNL 20GA L3.5IN YEL QNCKE STYL DISP

## (undated) DEVICE — SUT VCRL + 1 27IN ABSRB UD OS-6 L36MM

## (undated) DEVICE — DRAPE,U/SHT,SPLIT,FILM,60X84,STERILE: Brand: MEDLINE

## (undated) DEVICE — SUTURE ETHIBOND 5 V-37

## (undated) DEVICE — STERILE POLYISOPRENE POWDER-FREE SURGICAL GLOVES: Brand: PROTEXIS

## (undated) DEVICE — ADHESIVE SKIN TOP FOR WND CLSR DERMBND ADV

## (undated) DEVICE — PILLOW ABDUCTION HIP MED

## (undated) DEVICE — TOTAL HIP CDS: Brand: MEDLINE INDUSTRIES, INC.

## (undated) DEVICE — WAX BNE 2.5GM BEESWAX PAR AND ISO PALMITATE

## (undated) DEVICE — GAUZE,SPONGE,USP,4"X4",12PLY,STRL,10/PK: Brand: MEDLINE INDUSTRIES, INC.

## (undated) DEVICE — SLEEVE COMPR MD KNEE LEN SGL USE KENDALL SCD

## (undated) DEVICE — KIT INCIS MGMT PREVENA DRAIN 13CM PEEL AND PLC DISP

## (undated) DEVICE — SHEET,DRAPE,70X100,STERILE: Brand: MEDLINE

## (undated) DEVICE — 3M™ STERI-STRIP™ REINFORCED ADHESIVE SKIN CLOSURES, R1547, 1/2 IN X 4 IN (12 MM X 100 MM), 6 STRIPS/ENVELOPE: Brand: 3M™ STERI-STRIP™

## (undated) DEVICE — SKIN REG/FINE DUAL MARKER, RULER, LABELS: Brand: MEDLINE

## (undated) DEVICE — GLOVE SUR 7 SENSICARE PI PIP CRM PWD F

## (undated) DEVICE — MEDI-VAC SUCTION HANDLE REGULAR CAPACITY: Brand: CARDINAL HEALTH

## (undated) DEVICE — SUTURE VICRYL 2-0 CP-1

## (undated) DEVICE — INTENDED TO AID IN THE PASSING OF SUTURES THROUGH BONE AND SOFT TISSUE DURING ORTHOPEDIC SURGERY: Brand: HOFFEE SUTURE RETRIEVER

## (undated) DEVICE — NON-ADHERENT STRIPS,OIL EMULSION: Brand: CURITY

## (undated) DEVICE — COVER,LIGHT,CAMERA,HARD,1/PK,STRL: Brand: MEDLINE

## (undated) DEVICE — VEST SURG DISP

## (undated) DEVICE — HOOD, PEEL-AWAY: Brand: FLYTE

## (undated) DEVICE — BIPOLAR SEALER 23-112-1 AQM 6.0: Brand: AQUAMANTYS ®

## (undated) DEVICE — SPONGE RAYTEC 4X4 RF DETECT

## (undated) DEVICE — Device

## (undated) DEVICE — SUT MCRYL 3-0 27IN ABSRB UD 19MM PS-2 3/8

## (undated) DEVICE — ANTISEPTIC 4OZ 70% ISO ALC

## (undated) DEVICE — TIP CLEANER: Brand: VALLEYLAB

## (undated) DEVICE — WRAP HIP COMPR

## (undated) DEVICE — SHEET,DRAPE,40X58,STERILE: Brand: MEDLINE

## (undated) DEVICE — SUTURE VICRYL 1 OS-6

## (undated) DEVICE — CONVERTORS STOCKINETTE: Brand: CONVERTORS

## (undated) NOTE — Clinical Note
05/11/2017        [facility]      Dear Ashlee Fletcher,    [facility] records indicate that you have outstanding lab work and or testing that was ordered for you and has not yet been completed:  Lab Frequency Next Occurrence   COMP METAB

## (undated) NOTE — IP AVS SNAPSHOT
Patient Demographics     Address  [address] [address] Phone  [phone] [facility])  [phone] (Work)  [phone] [facility]) E-mail Address  [email or web address]      Emergency Contact(s)     Name Relation Home Work Mobile    Asim Allison Spouse 6 No smoking  ? Avoid smoking. It is known to cause breathing problems and can decrease the rate of healing. Incision care/Dressing changes  ? Wash hands before and after dressing changes.     FOR MEDIPORE/COVERLET DRESSINGS:  Change dressing daily using M ? Keep pain manageable; pain should not disrupt your sleep or activities like getting out of bed or walking. ? You may need pain medication regularly (every 4-6 hours) the first 2 weeks and then begin to decrease how often you are taking it.   ? Take pain ? Do not allow others or pets to touch your incision. ? Avoid people that have colds or the flu. ? Your surgeon may recommend that you take antibiotics before you undergo any dental or other invasive surgical procedures after your joint replacement.  Keli ? Inability to walk or put weight on your surgical leg      Signs of blood clot  ? Pain, excessive tenderness, redness, or swelling in leg or calf (other than incision site).             Go directly to the ER or CALL 911 if  you:  ? become short of breath Weight bearing as tolerated to left leg with walker  Ice to hip as needed for pain and swelling  Home health for home physical therapy and RN  Continue incentive spirometer use  May resume regular home diet                Follow-up Information     Svetlana Guerrero rivaroxaban 10 MG Tabs  Commonly known as:  XARELTO      Take 1 tablet (10 mg total) by mouth daily. Mateo Yang MD         Sulfacetamide Sodium-Sulfur 10-5 % Lotn      Apply topically. As directed.           TraMADol HCl 50 MG Tabs  Commo [id number] docusate sodium (COLACE) cap 100 mg 11/22/17 0811 Given      [id number] escitalopram (LEXAPRO) tab 20 mg 11/22/17 0811 Given      [id number] oxyCODONE HCl (OXY-IR) cap/tab 5 mg (Or Linked Group #1) 11/22/17 0134 Given      [id number] rivaroxaban (X Ordering provider:  Xiomara Chan MD  11/21/17 2302 Resulting lab:  [facility]   Narrative: The following orders were created for panel order CBC WITH DIFFERENTIAL WITH PLATELET.   Procedure                               Abnormality         Status 1. Consult to Hospitalist Once [[id number]] ordered by Daniel Malin MD at [address] [address] Patient Status:  Inpatient    1965 MRN [id number]   [facility] 3SW-A Attending No current facility-administered medications on file prior to encounter. Current Outpatient Prescriptions on File Prior to Encounter:  Levothyroxine Sodium 200 MCG Oral Tab Take 1 tablet (200 mcg total) by mouth once daily.  Disp: 90 tablet Rfl: 2   Medro ASSESSMENT / PLAN:     1. Left hip OA s/p left THR 11/20  1. Management per ortho  2. PT/OT  3. Pain control  2. Hypothyroidism  1. Synthroid  3. Depression  1. Continue lexapro[NG.1]   4. Tobacco abuse/nicotine dependence   1. Declines nicotine patch  2. No date:   No date:       Comment: X2  2017: COLONOSCOPY N/A      Comment: Procedure: COLONOSCOPY, POSSIBLE BIOPSY,                POSSIBLE POLYPECTOMY 81585;  Surgeon: Trevor Kat DO;  Location: [facility] FUNCTIONAL ABILITY STATUS  Gait Assessment[KT. 1]   Gait Assistance: Modified independent  Distance (ft): 300  Assistive Device: Rolling walker  Pattern: L Decreased stance time  Stoop/Curb Assistance: Supervision  Comment : Scores per FIMs scale per dept p  was present yes   is a spouse[KT. 1]    Patient End of Session: Up in chair;Needs met;RN aware of session/findings[KT. 2]    ASSESSMENT   Increased gait distance and requiring less assist for transfers and gait. Able to recall 3/3 THP's.  Progressi Active Problems:    Preop testing    Depression    Obesity (BMI 30-39. 9)    Osteoarthritis of hip[KT. 2]      Past Medical History[KT. 1]  Past Medical History:   Diagnosis Date   • Acne rosacea    • ADD (attention deficit disorder)    • Chronic midline low RANGE OF MOTION AND STRENGTH ASSESSMENT  Upper extremity ROM and strength are per OT eval    Lower extremity ROM is within functional limits with the exception of left hip    Lower extremity strength is within functional limits with the exception of left h Able to perform toilet transfer with supervision and cues to slide L LE out prior to standing. Able to go from sit to supine with supervision while maintaining THP's. Exercise/Education Provided:  Bed mobility  Gait training  Transfer training[KT. 1] level: modified independent    Goal #4    Patient will negotiate 12 stairs/one curb w/ assistive device and supervision   Goal #5    Patient verbalizes and/or demonstrates all precautions and safety concerns independently   Goal #6        Goal Comments: Go Comment: Procedure: COLONOSCOPY, POSSIBLE BIOPSY,                POSSIBLE POLYPECTOMY 21225;  Surgeon: Kylee Adam DO;  Location: [facility]  No date: KIMBERLEY BIOPSY STEREOTACTIC NODULE 2 SITE BILAT  No date: OTHER SURGICAL HISTORY -   Putting on and taking off regular upper body clothing?: None  -   Taking care of personal grooming such as brushing teeth?: None  -   Eating meals?: None    AM-PAC Score:  Score: 21  Approx Degree of Impairment: 32.79%  Standardized Score (AM-PAC Scale Patient Complexity  Occupational Profile/Medical History[MM. 1]  LOW - Brief history including review of medical or therapy records[MM.3]    Specific performance deficits impacting engagement in ADL/IADL[MM.1]  LOW  1 - 3 performance deficits[MM. 3]    Lázaro

## (undated) NOTE — LETTER
24    Orthopedic Surgery   Pre-Operative Clearance Request    Patient Name:   Carolina Allison             :   1965    Surgeon: Dr. Mckeon             Date of Surgery: 25    Surgical Procedure: RIGHT ANTERIOR TOTAL HIP ARTHROPLASTY.       MUST COMPLETE ALL OF THE FOLLOWING 2-3 WEEKS PRIOR TO YOUR SURGERY TO AVOID CANCELLATION, DUE TO THE RULE THEIR WILL BE NO EXCEPTIONS!      [x]  History and Physical        [x]  Medical  Clearance                     Required pre-op testing to be ordered:                        [x]  CBC W/Diff                                                                   [x]  CMP                                                                                                                                                                           [x]  PT/INR    [x]  PTT     [x]  Type and Screen                         **Please fax test results, H&P, and clearance to [phone] and to P.A.T at [phone]**

## (undated) NOTE — LETTER
Patient Name: Carolina Allison        : 1965       Medical Record #: [id number]    CONSENT FOR PROCEDURES/SEDATION    Date: 2024       Time: 10:06 AM        1. I authorize the performance upon Carolina Allison the following:    ______RIGHT INTRA-ARTICULAR HIP STEROID INJECTION        ______________    2. I authorize Dr. Patterson (and whomever is designated as the doctor’s assistant), to perform the above mentioned procedures.    3. If any unforeseen conditions arise during this procedure calling for additional procedures, operations, or medications (including anesthesia and blood transfusion), I  further request and authorize the doctor to do whatever he/she deems advisable in my interest.    4. I consent to the taking and reproduction of any photographs in the course of this procedure for professional purposes.    5. I consent to the administration of such sedation as may be considered necessary or advisable by the physician responsible for this service, with the exception of  _____________________________.    6. I have been informed by my doctor of the nature and purpose of this procedure/sedation, possible alternative methods of treatment, risk involved and possible complications.      Signature of Patient:  ___________________________    Signature of person authorized to consent for patient: Relationship to patient:  ___________________________    ___________________    Witness: ____________________     Date: ______________    Provider: ____________________     Date: ______________

## (undated) NOTE — LETTER
[address] [address]    2/22/2018      Dear  Mel Garcia    In order to provide the highest quality care, [facility] uses a sophisticated computer system to track our patient's records.   Ceci Price

## (undated) NOTE — LETTER
[facility] Pre-Admission Testing Department  Phone: [phone]  Right Fax: [phone]  CLARIFICATION FOR E-SSS    Sent By:   [id number] Date: 2025     Patient Name: Carolina Allison  Surgery Date: 2025  CSN: [id number]     Medical Record: [id number]  : 1965      Age: 59 year old        Sex: female    Surgeons and Role:     * Charles Mckeon MD - Primary  FAX: [phone]  Procedure Comments:  RIGHT ANTERIOR TOTAL HIP ARTHROPLASTY    YOU SHOULD RECEIVE 01 PAGES (INCLUDING COVER SHEET)    Please note that clarification is needed on the Electronic-Surgery Scheduling Sheet (E-SSS)  the original is included with this letter. Please have physician review and make changes on the faxed copy of the E-SSS.    Procedure per patient should be properly stating the drug needed, ie: TXA is missing from route and dose on current E-SSS.       Please review and submit change     THANK YOU    ALL CHANGES MUST BE DOCUMENTED ON THE E-SSS AND SIGNED BY THE PHYSICIAN    After physician has made the changes and signed the E-SSS please fax it to [phone] and these changes will then be made in Epic by the OR schedulers.     If you have any questions please call Pre-Admission Testing at [phone]    Thank you,    Pre-Admission Testing    [address]  [address]  Phone: [phone]  Fax: [phone]  [email or web address]    STATEMENT OF CONFIDENTIALITY: This transmittal is intended only for the use of the individual entity to which is addressed and may contain information that is privileged and confidential. information contained. If the reader of this message IS NOT the intended recipient, you are hereby notified that any disclosure, distribution or copying of this information is strictly prohibited. If you have received this transmission in error, please notify us immediately by telephone and return the original documents to us at the above address via the [employer]  [facility] Postal [employer]. Thank you.

## (undated) NOTE — Clinical Note
04/07/2017                                        Sam Hopkins  [facility] [address]  [place] [address]    4/7/2017      Dear  Sam Hopkins    In order to provide the highest quality care, [facility] uses a sophisticated computer system to track our patient's rec

## (undated) NOTE — Clinical Note
Nichol Dickson saw Gissel Mckeon in the office today. She presents for her first screening colonoscopy. Additionally she has a long-standing history of hemorrhoidal problems and I am also recommending hemorrhoidectomy. She is scheduled here today for both procedures.

## (undated) NOTE — LETTER
02/06/19        Josue Montana  [address]  [place] [address]      Dear Natividad Kinney,    [facility] records indicate that you have outstanding lab work and or testing that was ordered for you and has not yet been completed:  Lab Frequency Next Occurrence   TSH+FREE T

## (undated) NOTE — MR AVS SNAPSHOT
[facility]  [address] [place] [id number]  [phone]               Thank you for choosing us for your health care visit with Rajendra Dior DO.   We are glad to serve you and happy to provide you with this summary PROAIR  (90 Base) MCG/ACT Aers   Generic drug:  Albuterol Sulfate HFA   INHALE TWO PUFFS BY MOUTH EVERY FOUR HOURS AS NEEDED FOR WHEEZING           Sulfacetamide Sodium-Sulfur 10-5 % Foam   APPLY TO THE AFFECTED AREAS DAILY           TraMADol HCl 5 Weight Reduction Maintain normal body weight (body mass index 18.5-24.9 kg/m2)   DASH eating plan Adopt a diet rich in fruits, vegetables, and low fat dairy products with reduced content of saturated and total fat.    Dietary sodium reduction Reduce dietary Don’t forget strength training with weights and resistance Set goals and track your progress   You don’t need to join a gym. Home exercises work great.  Put more priority on exercise in your life                    Visit [facility] online at

## (undated) NOTE — LETTER
25    Orthopedic Surgery   Pre-Operative Clearance Request    Patient Name:   Carolina Allison             :   1965    Surgeon: Dr. Mckeon             Date of Surgery: 25    Surgical Procedure: RIGHT ANTERIOR TOTAL HIP ARTHROPLASTY.       MUST COMPLETE ALL OF THE FOLLOWING 2-3 WEEKS PRIOR TO YOUR SURGERY TO AVOID CANCELLATION, DUE TO THE RULE THEIR WILL BE NO EXCEPTIONS!      [x]  History and Physical        [x]  Medical  Clearance                     Required pre-op testing to be ordered:                        [x]  CBC W/Diff                                                                   [x]  CMP                                                                                                                                                                             [x]  PT/INR    [x]  PTT     [x]  Type and Screen                            **Please fax test results, H&P, and clearance to [phone] and to P.A.T at [phone]**

## (undated) NOTE — Clinical Note
[facility] [address]  [phone]        6/13/2017        Leroy Cisneros  [facility]      Dear Leroy Cisneros,      To help us provide the highest q

## (undated) NOTE — LETTER
07/14/17        Javy Pedraza  [address]  [place] [address]      Dear Sybil,    [facility] records indicate that you have outstanding lab work and or testing that was ordered for you and has not yet been completed:  Lab Frequency Next Occurrence   COMP Aura Mai